# Patient Record
Sex: FEMALE | Race: WHITE | NOT HISPANIC OR LATINO | Employment: OTHER | URBAN - METROPOLITAN AREA
[De-identification: names, ages, dates, MRNs, and addresses within clinical notes are randomized per-mention and may not be internally consistent; named-entity substitution may affect disease eponyms.]

---

## 2017-02-07 ENCOUNTER — TELEPHONE (OUTPATIENT)
Dept: ONCOLOGY | Facility: CLINIC | Age: 82
End: 2017-02-07

## 2017-02-07 NOTE — TELEPHONE ENCOUNTER
----- Message from Delphine Corona sent at 2/7/2017 11:38 AM EST -----  Regarding: DARA PAIN  Contact: [phone]  DAUGHTER PJ WOULD LIKE FOR SOMEONE TO CALL HER BACK ABOUT THE PATIENT.  She has pain and maybe and urinary tract infection.

## 2017-02-08 ENCOUNTER — OFFICE VISIT (OUTPATIENT)
Dept: INTERNAL MEDICINE | Facility: CLINIC | Age: 82
End: 2017-02-08

## 2017-02-08 VITALS
RESPIRATION RATE: 14 BRPM | SYSTOLIC BLOOD PRESSURE: 128 MMHG | HEART RATE: 76 BPM | TEMPERATURE: 98.2 F | DIASTOLIC BLOOD PRESSURE: 72 MMHG | WEIGHT: 157 LBS | BODY MASS INDEX: 25.23 KG/M2 | OXYGEN SATURATION: 95 % | HEIGHT: 66 IN

## 2017-02-08 DIAGNOSIS — R39.9 URINARY SYMPTOM OR SIGN: Primary | ICD-10-CM

## 2017-02-08 DIAGNOSIS — I10 ESSENTIAL HYPERTENSION: ICD-10-CM

## 2017-02-08 DIAGNOSIS — I48.0 PAROXYSMAL ATRIAL FIBRILLATION (HCC): ICD-10-CM

## 2017-02-08 LAB
BILIRUB BLD-MCNC: NEGATIVE MG/DL
CLARITY, POC: ABNORMAL
COLOR UR: YELLOW
GLUCOSE UR STRIP-MCNC: NEGATIVE MG/DL
KETONES UR QL: NEGATIVE
LEUKOCYTE EST, POC: ABNORMAL
NITRITE UR-MCNC: NEGATIVE MG/ML
PH UR: 7 [PH] (ref 5–8)
PROT UR STRIP-MCNC: ABNORMAL MG/DL
RBC # UR STRIP: ABNORMAL /UL
SP GR UR: 1.01 (ref 1–1.03)
UROBILINOGEN UR QL: NORMAL

## 2017-02-08 PROCEDURE — 81003 URINALYSIS AUTO W/O SCOPE: CPT | Performed by: INTERNAL MEDICINE

## 2017-02-08 PROCEDURE — 99214 OFFICE O/P EST MOD 30 MIN: CPT | Performed by: INTERNAL MEDICINE

## 2017-02-08 RX ORDER — LOSARTAN POTASSIUM AND HYDROCHLOROTHIAZIDE 12.5; 1 MG/1; MG/1
1 TABLET ORAL DAILY
Status: ON HOLD | COMMUNITY
End: 2017-07-26

## 2017-02-08 RX ORDER — LEVOFLOXACIN 500 MG/1
500 TABLET, FILM COATED ORAL DAILY
COMMUNITY
End: 2017-03-09

## 2017-02-08 NOTE — PROGRESS NOTES
Venice Green is a 85 y.o. female    HPI coming in with her son-in-law continues to have dysuria and urinary frequency. Has been following up with urology. Yesterday the urologist had prescribed levofloxacin for her has had a history of urine culture positive for Enterobacter in the past. No fever or chills denies flank pain  Son-in-law has brought in all her medications for review some appear to be duplicates    The following portions of the patient's history were reviewed and updated as appropriate: allergies, current medications, past family history, past medical history, past social history, past surgical history, and problem list.     Review of Systems   Constitutional: Positive for fatigue. Negative for activity change, appetite change and fever.   HENT: Negative for congestion, ear discharge, ear pain and trouble swallowing.    Eyes: Negative for photophobia and visual disturbance.   Respiratory: Negative for cough and shortness of breath.    Cardiovascular: Negative for chest pain and palpitations.   Gastrointestinal: Negative for abdominal distention, abdominal pain, constipation, diarrhea, nausea and vomiting.   Endocrine: Negative.    Genitourinary: Positive for dysuria and urgency. Negative for hematuria.        Nocturia   Skin: Negative for color change and rash.   Allergic/Immunologic: Negative.    Neurological: Positive for weakness. Negative for dizziness, light-headedness and headaches.   Hematological: Negative for adenopathy. Does not bruise/bleed easily.   Psychiatric/Behavioral: Negative for agitation, confusion and dysphoric mood. The patient is nervous/anxious.        Vitals:    02/08/17 1054   BP: 128/72   Pulse: 76   Resp: 14   Temp: 98.2 °F (36.8 °C)   SpO2: 95%       Objective  Physical Exam   Constitutional: She is oriented to person, place, and time. She appears well-developed and well-nourished. No distress.   HENT:   Nose: Nose normal.   Mouth/Throat: Oropharynx is  clear and moist.   Eyes: Conjunctivae and EOM are normal. No scleral icterus.   Neck: No tracheal deviation present. No thyromegaly present.   Cardiovascular: Normal rate and regular rhythm.  Exam reveals no friction rub.    No murmur heard.  Pulmonary/Chest: No respiratory distress. She has no wheezes. She has no rales.   Abdominal: Soft. She exhibits no distension and no mass. There is no tenderness. There is no guarding.   Genitourinary: Vagina normal. No vaginal discharge found.   Musculoskeletal: Normal range of motion. She exhibits no deformity.   Lymphadenopathy:     She has no cervical adenopathy.   Neurological: She is alert and oriented to person, place, and time. She has normal reflexes. No cranial nerve deficit. Coordination normal.   Skin: Skin is warm and dry. No rash noted. No erythema.   Psychiatric: She has a normal mood and affect. Her behavior is normal. Judgment and thought content normal.       Diagnoses and all orders for this visit:    Urinary symptom or sign continue with current medications. External genitalia exam unremarkable. No discharge or bleeding noted through the urethral or vaginal opening  -     POC Urinalysis Dipstick, Automated  Hypertension reviewed medications. Univasc has been discontinued follow readings at home  A. fib controlled rate continue with anticoagulation therapy  Discussed medications and wrote down list with indications for each medication and handed to son-in-law  Other orders  -     levoFLOXacin (LEVAQUIN) 500 MG tablet; Take 500 mg by mouth Daily.  -     losartan-hydrochlorothiazide (HYZAAR) 100-12.5 MG per tablet; Take 1 tablet by mouth Daily.

## 2017-02-09 ENCOUNTER — OUTSIDE FACILITY SERVICE (OUTPATIENT)
Dept: INTERNAL MEDICINE | Facility: CLINIC | Age: 82
End: 2017-02-09

## 2017-02-13 RX ORDER — MIRTAZAPINE 30 MG/1
TABLET, FILM COATED ORAL
Qty: 30 TABLET | Refills: 5 | Status: SHIPPED | OUTPATIENT
Start: 2017-02-13 | End: 2017-02-15 | Stop reason: SDUPTHER

## 2017-02-15 RX ORDER — MIRTAZAPINE 30 MG/1
30 TABLET, FILM COATED ORAL NIGHTLY
Qty: 30 TABLET | Refills: 5 | Status: SHIPPED | OUTPATIENT
Start: 2017-02-15 | End: 2017-08-09 | Stop reason: SDUPTHER

## 2017-02-20 ENCOUNTER — OUTSIDE FACILITY SERVICE (OUTPATIENT)
Dept: INTERNAL MEDICINE | Facility: CLINIC | Age: 82
End: 2017-02-20

## 2017-02-20 PROCEDURE — G0179 MD RECERTIFICATION HHA PT: HCPCS | Performed by: INTERNAL MEDICINE

## 2017-02-21 RX ORDER — ATORVASTATIN CALCIUM 40 MG/1
TABLET, FILM COATED ORAL
Qty: 30 TABLET | Refills: 5 | Status: SHIPPED | OUTPATIENT
Start: 2017-02-21 | End: 2017-07-18 | Stop reason: SDUPTHER

## 2017-03-09 ENCOUNTER — OFFICE VISIT (OUTPATIENT)
Dept: INTERNAL MEDICINE | Facility: CLINIC | Age: 82
End: 2017-03-09

## 2017-03-09 VITALS
DIASTOLIC BLOOD PRESSURE: 75 MMHG | HEART RATE: 75 BPM | TEMPERATURE: 98.5 F | WEIGHT: 156.13 LBS | HEIGHT: 66 IN | OXYGEN SATURATION: 96 % | SYSTOLIC BLOOD PRESSURE: 120 MMHG | BODY MASS INDEX: 25.09 KG/M2

## 2017-03-09 DIAGNOSIS — N30.90 CYSTITIS: Primary | ICD-10-CM

## 2017-03-09 PROCEDURE — 99214 OFFICE O/P EST MOD 30 MIN: CPT | Performed by: INTERNAL MEDICINE

## 2017-03-09 RX ORDER — ESTRADIOL 0.1 MG/G
CREAM VAGINAL
Refills: 11 | Status: ON HOLD | COMMUNITY
Start: 2017-01-23 | End: 2017-09-13

## 2017-03-09 RX ORDER — AMLODIPINE BESYLATE 5 MG/1
TABLET ORAL
Refills: 2 | COMMUNITY
Start: 2017-02-24 | End: 2017-03-09

## 2017-03-09 RX ORDER — TOLTERODINE 4 MG/1
4 CAPSULE, EXTENDED RELEASE ORAL DAILY
COMMUNITY
End: 2017-03-09

## 2017-03-09 RX ORDER — OXYBUTYNIN CHLORIDE 10 MG/1
TABLET, EXTENDED RELEASE ORAL
Refills: 5 | COMMUNITY
Start: 2017-02-21 | End: 2017-03-09

## 2017-03-09 NOTE — PROGRESS NOTES
Subjective  Jose M Green is a 85 y.o. female    HPI coming in for follow-up patient with complaints of urinary frequency and discomfort in her vaginal region she was given a prescription for Desogen with which she has had significant relief she is following up with urology denies fever or chills no hematuria no flank pain    The following portions of the patient's history were reviewed and updated as appropriate: allergies, current medications, past family history, past medical history, past social history, past surgical history, and problem list.     Review of Systems   Constitutional: Positive for fatigue. Negative for activity change, appetite change and fever.   HENT: Negative for congestion, ear discharge, ear pain and trouble swallowing.    Eyes: Negative for photophobia and visual disturbance.   Respiratory: Negative for cough and shortness of breath.    Cardiovascular: Negative for chest pain and palpitations.   Gastrointestinal: Negative for abdominal distention, abdominal pain, constipation, diarrhea, nausea and vomiting.   Endocrine: Negative.    Genitourinary: Positive for urgency. Negative for hematuria.   Musculoskeletal: Positive for arthralgias and back pain.   Skin: Negative for color change and rash.   Allergic/Immunologic: Negative.    Neurological: Positive for weakness. Negative for dizziness, light-headedness and headaches.   Hematological: Negative for adenopathy. Does not bruise/bleed easily.   Psychiatric/Behavioral: Positive for sleep disturbance. Negative for agitation, confusion and dysphoric mood. The patient is nervous/anxious.        Vitals:    03/09/17 1601   BP: 120/75   Pulse: 75   Temp: 98.5 °F (36.9 °C)   SpO2: 96%       Objective  Physical Exam   Constitutional: She is oriented to person, place, and time. She appears well-developed and well-nourished. No distress.   Using walker   HENT:   Nose: Nose normal.   Mouth/Throat: Oropharynx is clear and moist.   Eyes: Conjunctivae  and EOM are normal. No scleral icterus.   Neck: No tracheal deviation present. No thyromegaly present.   Cardiovascular: Normal rate and regular rhythm.  Exam reveals no friction rub.    No murmur heard.  Pulmonary/Chest: No respiratory distress. She has no wheezes. She has no rales.   Abdominal: Soft. She exhibits no distension and no mass. There is no tenderness. There is no guarding.   Musculoskeletal: Normal range of motion. She exhibits deformity.   Lymphadenopathy:     She has no cervical adenopathy.   Neurological: She is alert and oriented to person, place, and time. She has normal reflexes. No cranial nerve deficit. Coordination normal.   Skin: Skin is warm and dry. No rash noted. No erythema.   Psychiatric: She has a normal mood and affect. Her behavior is normal. Judgment and thought content normal.       Diagnoses and all orders for this visit:    Cystitis table symptoms appear to have resolved encouraged to push fluids reviewed medications is being taken off Detrol and troponin which she is not taking any way currently.  Antihypertensives meds reviewed DC amlodipine she has been off this for more than a week now BP readings at home show good control    Other orders  -     ESTRACE VAGINAL 0.1 MG/GM vaginal cream; INSERT 0.1 G 3 TIMES A WEEK BY VAGINAL ROUTE AS DIRECTED  -     Discontinue: tolterodine LA (DETROL LA) 4 MG 24 hr capsule; Take 4 mg by mouth Daily.  -     Discontinue: amLODIPine (NORVASC) 5 MG tablet; TAKE 2 TABLETS BY MOUTH ONE TIME A DAY  -     Discontinue: oxybutynin XL (DITROPAN-XL) 10 MG 24 hr tablet; TAKE 1 TABLET BY MOUTH ONE TIME A DAY FOR 30 DAYS

## 2017-03-13 RX ORDER — METOPROLOL TARTRATE 50 MG/1
TABLET, FILM COATED ORAL
Qty: 60 TABLET | Refills: 4 | Status: SHIPPED | OUTPATIENT
Start: 2017-03-13 | End: 2017-04-03 | Stop reason: SDUPTHER

## 2017-03-13 RX ORDER — AMLODIPINE BESYLATE 5 MG/1
TABLET ORAL
Qty: 180 TABLET | Refills: 1 | Status: SHIPPED | OUTPATIENT
Start: 2017-03-13 | End: 2017-08-03

## 2017-03-13 RX ORDER — LEVOTHYROXINE SODIUM 0.1 MG/1
TABLET ORAL
Qty: 90 TABLET | Refills: 2 | Status: SHIPPED | OUTPATIENT
Start: 2017-03-13 | End: 2018-01-01 | Stop reason: SDUPTHER

## 2017-03-20 RX ORDER — APIXABAN 2.5 MG/1
TABLET, FILM COATED ORAL
Qty: 60 TABLET | Refills: 5 | Status: SHIPPED | OUTPATIENT
Start: 2017-03-20 | End: 2017-09-08 | Stop reason: SDUPTHER

## 2017-03-27 RX ORDER — DOCUSATE SODIUM 100 MG/1
CAPSULE, LIQUID FILLED ORAL
Qty: 30 CAPSULE | Refills: 3 | Status: SHIPPED | OUTPATIENT
Start: 2017-03-27 | End: 2017-07-10 | Stop reason: SDUPTHER

## 2017-04-03 RX ORDER — METOPROLOL TARTRATE 50 MG/1
50 TABLET, FILM COATED ORAL 2 TIMES DAILY
Qty: 60 TABLET | Refills: 4 | Status: SHIPPED | OUTPATIENT
Start: 2017-04-03 | End: 2017-08-07 | Stop reason: SDUPTHER

## 2017-05-12 RX ORDER — CLOPIDOGREL BISULFATE 75 MG/1
75 TABLET ORAL DAILY
Qty: 30 TABLET | Refills: 5 | Status: SHIPPED | OUTPATIENT
Start: 2017-05-12 | End: 2017-08-03

## 2017-06-09 ENCOUNTER — OFFICE VISIT (OUTPATIENT)
Dept: INTERNAL MEDICINE | Facility: CLINIC | Age: 82
End: 2017-06-09

## 2017-06-09 VITALS
HEIGHT: 66 IN | DIASTOLIC BLOOD PRESSURE: 70 MMHG | OXYGEN SATURATION: 98 % | WEIGHT: 152.38 LBS | HEART RATE: 78 BPM | BODY MASS INDEX: 24.49 KG/M2 | TEMPERATURE: 98.2 F | SYSTOLIC BLOOD PRESSURE: 120 MMHG

## 2017-06-09 DIAGNOSIS — I48.0 PAROXYSMAL ATRIAL FIBRILLATION (HCC): ICD-10-CM

## 2017-06-09 DIAGNOSIS — I10 ESSENTIAL HYPERTENSION: ICD-10-CM

## 2017-06-09 DIAGNOSIS — E03.9 ACQUIRED HYPOTHYROIDISM: ICD-10-CM

## 2017-06-09 DIAGNOSIS — C91.10 CHRONIC LYMPHOCYTIC LEUKEMIA (HCC): Primary | Chronic | ICD-10-CM

## 2017-06-09 PROCEDURE — 99214 OFFICE O/P EST MOD 30 MIN: CPT | Performed by: INTERNAL MEDICINE

## 2017-06-09 NOTE — PROGRESS NOTES
Venice Green is a 85 y.o. female    HPI coming in for follow-up patient with a history of coronary artery disease, atrial fibrillation on anticoagulative therapy history of reflux esophagitis has occasional dysuria no hematuria denies abdominal pain no fever or chills    The following portions of the patient's history were reviewed and updated as appropriate: allergies, current medications, past family history, past medical history, past social history, past surgical history, and problem list.     Review of Systems   Constitutional: Negative.  Negative for activity change, appetite change, fatigue and fever.   HENT: Negative for congestion, ear discharge, ear pain and trouble swallowing.    Eyes: Negative for photophobia and visual disturbance.   Respiratory: Negative for cough and shortness of breath.    Cardiovascular: Negative for chest pain and palpitations.   Gastrointestinal: Negative for abdominal distention, abdominal pain, constipation, diarrhea, nausea and vomiting.   Endocrine: Negative.    Genitourinary: Negative for dysuria, hematuria and urgency.   Musculoskeletal: Positive for arthralgias. Negative for back pain, joint swelling and myalgias.   Skin: Negative for color change and rash.   Allergic/Immunologic: Negative.    Neurological: Negative for dizziness, weakness, light-headedness and headaches.   Hematological: Negative for adenopathy. Does not bruise/bleed easily.   Psychiatric/Behavioral: Positive for decreased concentration, dysphoric mood and sleep disturbance. Negative for agitation and confusion. The patient is not nervous/anxious.        Vitals:    06/09/17 1616   BP: 120/70   Pulse: 78   Temp: 98.2 °F (36.8 °C)   SpO2: 98%       Objective  Physical Exam   Constitutional: She is oriented to person, place, and time. She appears well-developed and well-nourished. No distress.   Using walker   HENT:   Nose: Nose normal.   Mouth/Throat: Oropharynx is clear and moist.   Eyes:  Conjunctivae and EOM are normal. No scleral icterus.   Neck: No tracheal deviation present. No thyromegaly present.   Cardiovascular: Normal rate and regular rhythm.  Exam reveals no friction rub.    No murmur heard.  Pulmonary/Chest: No respiratory distress. She has no wheezes. She has no rales.   Abdominal: Soft. She exhibits no distension and no mass. There is no tenderness. There is no guarding.   Musculoskeletal: Normal range of motion. She exhibits tenderness and deformity.   kyphosis   Lymphadenopathy:     She has no cervical adenopathy.   Neurological: She is alert and oriented to person, place, and time. She has normal reflexes. No cranial nerve deficit. Coordination normal.   Skin: Skin is warm and dry. No rash noted. No erythema.   Psychiatric: She has a normal mood and affect. Her behavior is normal. Judgment and thought content normal.       Diagnoses and all orders for this visit:    Chronic lymphocytic leukemia stable is following up with oncology    Essential hypertension stable with current meds and low-salt diet    Acquired hypothyroidism clinically euthyroid follow TSH    Paroxysmal atrial fibrillation rate control okay continue with eliquis

## 2017-06-12 RX ORDER — TRAZODONE HYDROCHLORIDE 50 MG/1
TABLET ORAL
Qty: 30 TABLET | Refills: 5 | Status: SHIPPED | OUTPATIENT
Start: 2017-06-12 | End: 2018-01-01 | Stop reason: ALTCHOICE

## 2017-07-10 RX ORDER — DOCUSATE SODIUM 100 MG/1
CAPSULE, LIQUID FILLED ORAL
Qty: 30 CAPSULE | Refills: 5 | Status: SHIPPED | OUTPATIENT
Start: 2017-07-10 | End: 2018-01-01 | Stop reason: SDUPTHER

## 2017-07-10 RX ORDER — OMEPRAZOLE 20 MG/1
CAPSULE, DELAYED RELEASE ORAL
Qty: 30 CAPSULE | Refills: 11 | Status: SHIPPED | OUTPATIENT
Start: 2017-07-10 | End: 2018-01-01 | Stop reason: SDUPTHER

## 2017-07-18 RX ORDER — ATORVASTATIN CALCIUM 40 MG/1
40 TABLET, FILM COATED ORAL DAILY
Qty: 30 TABLET | Refills: 5 | Status: SHIPPED | OUTPATIENT
Start: 2017-07-18 | End: 2017-08-02 | Stop reason: SDUPTHER

## 2017-07-25 ENCOUNTER — HOSPITAL ENCOUNTER (INPATIENT)
Facility: HOSPITAL | Age: 82
LOS: 2 days | Discharge: HOME HEALTH | End: 2017-07-27
Attending: FAMILY MEDICINE | Admitting: INTERNAL MEDICINE

## 2017-07-25 ENCOUNTER — APPOINTMENT (OUTPATIENT)
Dept: CT IMAGING | Facility: HOSPITAL | Age: 82
End: 2017-07-25

## 2017-07-25 ENCOUNTER — APPOINTMENT (OUTPATIENT)
Dept: GENERAL RADIOLOGY | Facility: HOSPITAL | Age: 82
End: 2017-07-25

## 2017-07-25 DIAGNOSIS — Z74.09 IMPAIRED MOBILITY AND ADLS: ICD-10-CM

## 2017-07-25 DIAGNOSIS — I48.0 PAROXYSMAL ATRIAL FIBRILLATION (HCC): ICD-10-CM

## 2017-07-25 DIAGNOSIS — Z78.9 IMPAIRED MOBILITY AND ADLS: ICD-10-CM

## 2017-07-25 DIAGNOSIS — E87.6 HYPOKALEMIA: ICD-10-CM

## 2017-07-25 DIAGNOSIS — I10 ESSENTIAL HYPERTENSION: ICD-10-CM

## 2017-07-25 DIAGNOSIS — Z74.09 IMPAIRED FUNCTIONAL MOBILITY, BALANCE, GAIT, AND ENDURANCE: ICD-10-CM

## 2017-07-25 DIAGNOSIS — N30.01 ACUTE CYSTITIS WITH HEMATURIA: Primary | ICD-10-CM

## 2017-07-25 DIAGNOSIS — R53.1 WEAKNESS: ICD-10-CM

## 2017-07-25 LAB
ALBUMIN SERPL-MCNC: 3.5 G/DL (ref 3.5–5)
ALBUMIN/GLOB SERPL: 1.4 G/DL (ref 1–2)
ALP SERPL-CCNC: 133 U/L (ref 38–126)
ALT SERPL W P-5'-P-CCNC: 35 U/L (ref 13–69)
ANION GAP SERPL CALCULATED.3IONS-SCNC: 14.1 MMOL/L
ANISOCYTOSIS BLD QL: NORMAL
AST SERPL-CCNC: 26 U/L (ref 15–46)
BACTERIA UR QL AUTO: ABNORMAL /HPF
BASOPHILS # BLD AUTO: 0.04 10*3/MM3 (ref 0–0.2)
BASOPHILS NFR BLD AUTO: 0.4 % (ref 0–2.5)
BILIRUB SERPL-MCNC: 0.9 MG/DL (ref 0.2–1.3)
BILIRUB UR QL STRIP: NEGATIVE
BUN BLD-MCNC: 16 MG/DL (ref 7–20)
BUN/CREAT SERPL: 17.8 (ref 7.1–23.5)
CALCIUM SPEC-SCNC: 8.8 MG/DL (ref 8.4–10.2)
CHLORIDE SERPL-SCNC: 92 MMOL/L (ref 98–107)
CLARITY UR: ABNORMAL
CO2 SERPL-SCNC: 24 MMOL/L (ref 26–30)
COLOR UR: YELLOW
CREAT BLD-MCNC: 0.9 MG/DL (ref 0.6–1.3)
D-LACTATE SERPL-SCNC: 1.4 MMOL/L (ref 0.5–2)
DEPRECATED RDW RBC AUTO: 49.8 FL (ref 37–54)
EOSINOPHIL # BLD AUTO: 0.08 10*3/MM3 (ref 0–0.7)
EOSINOPHIL NFR BLD AUTO: 0.8 % (ref 0–7)
ERYTHROCYTE [DISTWIDTH] IN BLOOD BY AUTOMATED COUNT: 15.8 % (ref 11.5–14.5)
GFR SERPL CREATININE-BSD FRML MDRD: 59 ML/MIN/1.73
GLOBULIN UR ELPH-MCNC: 2.5 GM/DL
GLUCOSE BLD-MCNC: 111 MG/DL (ref 74–98)
GLUCOSE UR STRIP-MCNC: NEGATIVE MG/DL
HCT VFR BLD AUTO: 33.6 % (ref 37–47)
HGB BLD-MCNC: 10.8 G/DL (ref 12–16)
HGB UR QL STRIP.AUTO: ABNORMAL
HYALINE CASTS UR QL AUTO: ABNORMAL /LPF
IMM GRANULOCYTES # BLD: 0.05 10*3/MM3 (ref 0–0.06)
IMM GRANULOCYTES NFR BLD: 0.5 % (ref 0–0.6)
KETONES UR QL STRIP: ABNORMAL
LEUKOCYTE ESTERASE UR QL STRIP.AUTO: ABNORMAL
LYMPHOCYTES # BLD AUTO: 3.05 10*3/MM3 (ref 0.6–3.4)
LYMPHOCYTES NFR BLD AUTO: 32.3 % (ref 10–50)
MAGNESIUM SERPL-MCNC: 1.5 MG/DL (ref 1.6–2.3)
MCH RBC QN AUTO: 27.5 PG (ref 27–31)
MCHC RBC AUTO-ENTMCNC: 32.1 G/DL (ref 30–37)
MCV RBC AUTO: 85.5 FL (ref 81–99)
MONOCYTES # BLD AUTO: 0.82 10*3/MM3 (ref 0–0.9)
MONOCYTES NFR BLD AUTO: 8.7 % (ref 0–12)
NEUTROPHILS # BLD AUTO: 5.4 10*3/MM3 (ref 2–6.9)
NEUTROPHILS NFR BLD AUTO: 57.3 % (ref 37–80)
NITRITE UR QL STRIP: POSITIVE
NRBC BLD MANUAL-RTO: 0 /100 WBC (ref 0–0)
PH UR STRIP.AUTO: 6.5 [PH] (ref 5–8)
PLATELET # BLD AUTO: 86 10*3/MM3 (ref 130–400)
PMV BLD AUTO: 10.1 FL (ref 6–12)
POIKILOCYTOSIS BLD QL SMEAR: NORMAL
POTASSIUM BLD-SCNC: 3.1 MMOL/L (ref 3.5–5.1)
PROT SERPL-MCNC: 6 G/DL (ref 6.3–8.2)
PROT UR QL STRIP: ABNORMAL
RBC # BLD AUTO: 3.93 10*6/MM3 (ref 4.2–5.4)
RBC # UR: ABNORMAL /HPF
REF LAB TEST METHOD: ABNORMAL
SMALL PLATELETS BLD QL SMEAR: NORMAL
SODIUM BLD-SCNC: 127 MMOL/L (ref 137–145)
SP GR UR STRIP: 1.01 (ref 1–1.03)
SQUAMOUS #/AREA URNS HPF: ABNORMAL /HPF
TROPONIN I SERPL-MCNC: 0.02 NG/ML (ref 0–0.03)
UROBILINOGEN UR QL STRIP: ABNORMAL
WBC MORPH BLD: NORMAL
WBC NRBC COR # BLD: 9.44 10*3/MM3 (ref 4.8–10.8)
WBC UR QL AUTO: ABNORMAL /HPF
WHOLE BLOOD HOLD SPECIMEN: NORMAL

## 2017-07-25 PROCEDURE — 25010000002 MAGNESIUM SULFATE 2 GM/50ML SOLUTION: Performed by: INTERNAL MEDICINE

## 2017-07-25 PROCEDURE — 83735 ASSAY OF MAGNESIUM: CPT | Performed by: FAMILY MEDICINE

## 2017-07-25 PROCEDURE — 99285 EMERGENCY DEPT VISIT HI MDM: CPT

## 2017-07-25 PROCEDURE — 87086 URINE CULTURE/COLONY COUNT: CPT | Performed by: FAMILY MEDICINE

## 2017-07-25 PROCEDURE — 25010000002 CEFTRIAXONE: Performed by: FAMILY MEDICINE

## 2017-07-25 PROCEDURE — 85007 BL SMEAR W/DIFF WBC COUNT: CPT | Performed by: FAMILY MEDICINE

## 2017-07-25 PROCEDURE — 80053 COMPREHEN METABOLIC PANEL: CPT | Performed by: FAMILY MEDICINE

## 2017-07-25 PROCEDURE — 93005 ELECTROCARDIOGRAM TRACING: CPT | Performed by: FAMILY MEDICINE

## 2017-07-25 PROCEDURE — 72131 CT LUMBAR SPINE W/O DYE: CPT

## 2017-07-25 PROCEDURE — 71020 HC CHEST PA AND LATERAL: CPT

## 2017-07-25 PROCEDURE — 83605 ASSAY OF LACTIC ACID: CPT | Performed by: FAMILY MEDICINE

## 2017-07-25 PROCEDURE — 99222 1ST HOSP IP/OBS MODERATE 55: CPT | Performed by: INTERNAL MEDICINE

## 2017-07-25 PROCEDURE — 87040 BLOOD CULTURE FOR BACTERIA: CPT | Performed by: FAMILY MEDICINE

## 2017-07-25 PROCEDURE — 72170 X-RAY EXAM OF PELVIS: CPT

## 2017-07-25 PROCEDURE — 84484 ASSAY OF TROPONIN QUANT: CPT

## 2017-07-25 PROCEDURE — 81001 URINALYSIS AUTO W/SCOPE: CPT | Performed by: FAMILY MEDICINE

## 2017-07-25 PROCEDURE — 87186 SC STD MICRODIL/AGAR DIL: CPT | Performed by: FAMILY MEDICINE

## 2017-07-25 PROCEDURE — 87077 CULTURE AEROBIC IDENTIFY: CPT | Performed by: FAMILY MEDICINE

## 2017-07-25 PROCEDURE — 81003 URINALYSIS AUTO W/O SCOPE: CPT | Performed by: FAMILY MEDICINE

## 2017-07-25 PROCEDURE — 85025 COMPLETE CBC W/AUTO DIFF WBC: CPT | Performed by: FAMILY MEDICINE

## 2017-07-25 PROCEDURE — 93005 ELECTROCARDIOGRAM TRACING: CPT

## 2017-07-25 RX ORDER — HYDRALAZINE HYDROCHLORIDE 20 MG/ML
10 INJECTION INTRAMUSCULAR; INTRAVENOUS EVERY 6 HOURS PRN
Status: DISCONTINUED | OUTPATIENT
Start: 2017-07-25 | End: 2017-07-27 | Stop reason: HOSPADM

## 2017-07-25 RX ORDER — SODIUM CHLORIDE 9 MG/ML
75 INJECTION, SOLUTION INTRAVENOUS CONTINUOUS
Status: DISCONTINUED | OUTPATIENT
Start: 2017-07-25 | End: 2017-07-26

## 2017-07-25 RX ORDER — ACETAMINOPHEN 325 MG/1
650 TABLET ORAL EVERY 4 HOURS PRN
Status: DISCONTINUED | OUTPATIENT
Start: 2017-07-25 | End: 2017-07-27 | Stop reason: HOSPADM

## 2017-07-25 RX ORDER — CLOPIDOGREL BISULFATE 75 MG/1
75 TABLET ORAL DAILY
Status: DISCONTINUED | OUTPATIENT
Start: 2017-07-26 | End: 2017-07-27 | Stop reason: HOSPADM

## 2017-07-25 RX ORDER — LEVOTHYROXINE SODIUM 0.1 MG/1
100 TABLET ORAL
Status: DISCONTINUED | OUTPATIENT
Start: 2017-07-26 | End: 2017-07-27 | Stop reason: HOSPADM

## 2017-07-25 RX ORDER — MOEXIPRIL HYDROCHLORIDE 7.5 MG/1
7.5 TABLET, FILM COATED ORAL NIGHTLY
COMMUNITY
End: 2017-09-14 | Stop reason: HOSPADM

## 2017-07-25 RX ORDER — SODIUM CHLORIDE 0.9 % (FLUSH) 0.9 %
10 SYRINGE (ML) INJECTION AS NEEDED
Status: DISCONTINUED | OUTPATIENT
Start: 2017-07-25 | End: 2017-07-27 | Stop reason: HOSPADM

## 2017-07-25 RX ORDER — LATANOPROST 50 UG/ML
1 SOLUTION/ DROPS OPHTHALMIC NIGHTLY
Status: DISCONTINUED | OUTPATIENT
Start: 2017-07-25 | End: 2017-07-27 | Stop reason: HOSPADM

## 2017-07-25 RX ORDER — MAGNESIUM SULFATE HEPTAHYDRATE 40 MG/ML
2 INJECTION, SOLUTION INTRAVENOUS ONCE
Status: COMPLETED | OUTPATIENT
Start: 2017-07-25 | End: 2017-07-26

## 2017-07-25 RX ORDER — AMLODIPINE BESYLATE 5 MG/1
5 TABLET ORAL
Status: DISCONTINUED | OUTPATIENT
Start: 2017-07-26 | End: 2017-07-27 | Stop reason: HOSPADM

## 2017-07-25 RX ORDER — ACETAMINOPHEN 325 MG/1
650 TABLET ORAL ONCE
Status: COMPLETED | OUTPATIENT
Start: 2017-07-25 | End: 2017-07-25

## 2017-07-25 RX ORDER — TRAZODONE HYDROCHLORIDE 50 MG/1
50 TABLET ORAL NIGHTLY
Status: DISCONTINUED | OUTPATIENT
Start: 2017-07-25 | End: 2017-07-27 | Stop reason: HOSPADM

## 2017-07-25 RX ORDER — PANTOPRAZOLE SODIUM 40 MG/1
40 TABLET, DELAYED RELEASE ORAL EVERY MORNING
Status: DISCONTINUED | OUTPATIENT
Start: 2017-07-26 | End: 2017-07-27 | Stop reason: HOSPADM

## 2017-07-25 RX ORDER — POTASSIUM CHLORIDE 1.5 G/1.77G
20 POWDER, FOR SOLUTION ORAL ONCE
Status: COMPLETED | OUTPATIENT
Start: 2017-07-25 | End: 2017-07-25

## 2017-07-25 RX ORDER — MIRTAZAPINE 15 MG/1
30 TABLET, FILM COATED ORAL NIGHTLY
Status: DISCONTINUED | OUTPATIENT
Start: 2017-07-25 | End: 2017-07-27 | Stop reason: HOSPADM

## 2017-07-25 RX ORDER — METOPROLOL TARTRATE 50 MG/1
50 TABLET, FILM COATED ORAL 2 TIMES DAILY
Status: DISCONTINUED | OUTPATIENT
Start: 2017-07-26 | End: 2017-07-27 | Stop reason: HOSPADM

## 2017-07-25 RX ORDER — SODIUM CHLORIDE 0.9 % (FLUSH) 0.9 %
1-10 SYRINGE (ML) INJECTION AS NEEDED
Status: DISCONTINUED | OUTPATIENT
Start: 2017-07-25 | End: 2017-07-27 | Stop reason: HOSPADM

## 2017-07-25 RX ORDER — ATORVASTATIN CALCIUM 40 MG/1
40 TABLET, FILM COATED ORAL DAILY
Status: DISCONTINUED | OUTPATIENT
Start: 2017-07-26 | End: 2017-07-27 | Stop reason: HOSPADM

## 2017-07-25 RX ADMIN — SODIUM CHLORIDE 500 ML: 9 INJECTION, SOLUTION INTRAVENOUS at 20:21

## 2017-07-25 RX ADMIN — SODIUM CHLORIDE 75 ML/HR: 9 INJECTION, SOLUTION INTRAVENOUS at 23:45

## 2017-07-25 RX ADMIN — MAGNESIUM SULFATE HEPTAHYDRATE 2 G: 40 INJECTION, SOLUTION INTRAVENOUS at 23:45

## 2017-07-25 RX ADMIN — ACETAMINOPHEN 650 MG: 325 TABLET, FILM COATED ORAL at 20:16

## 2017-07-25 RX ADMIN — TRAZODONE HYDROCHLORIDE 50 MG: 50 TABLET ORAL at 23:45

## 2017-07-25 RX ADMIN — LATANOPROST 1 DROP: 50 SOLUTION OPHTHALMIC at 23:45

## 2017-07-25 RX ADMIN — CEFTRIAXONE 1 G: 1 INJECTION, POWDER, FOR SOLUTION INTRAMUSCULAR; INTRAVENOUS at 20:22

## 2017-07-25 RX ADMIN — POTASSIUM CHLORIDE 20 MEQ: 1.5 POWDER, FOR SOLUTION ORAL at 21:39

## 2017-07-25 RX ADMIN — MIRTAZAPINE 30 MG: 15 TABLET, FILM COATED ORAL at 23:44

## 2017-07-26 ENCOUNTER — APPOINTMENT (OUTPATIENT)
Dept: ULTRASOUND IMAGING | Facility: HOSPITAL | Age: 82
End: 2017-07-26

## 2017-07-26 PROBLEM — E87.6 HYPOKALEMIA: Status: ACTIVE | Noted: 2017-07-26

## 2017-07-26 LAB
ALBUMIN SERPL-MCNC: 3.7 G/DL (ref 3.5–5)
ALBUMIN/GLOB SERPL: 1.4 G/DL (ref 1–2)
ALP SERPL-CCNC: 154 U/L (ref 38–126)
ALT SERPL W P-5'-P-CCNC: 34 U/L (ref 13–69)
ANION GAP SERPL CALCULATED.3IONS-SCNC: 15.8 MMOL/L
AST SERPL-CCNC: 27 U/L (ref 15–46)
BILIRUB SERPL-MCNC: 0.9 MG/DL (ref 0.2–1.3)
BUN BLD-MCNC: 11 MG/DL (ref 7–20)
BUN/CREAT SERPL: 13.8 (ref 7.1–23.5)
CALCIUM SPEC-SCNC: 9 MG/DL (ref 8.4–10.2)
CHLORIDE SERPL-SCNC: 94 MMOL/L (ref 98–107)
CO2 SERPL-SCNC: 24 MMOL/L (ref 26–30)
CREAT BLD-MCNC: 0.8 MG/DL (ref 0.6–1.3)
DEPRECATED RDW RBC AUTO: 49.1 FL (ref 37–54)
ERYTHROCYTE [DISTWIDTH] IN BLOOD BY AUTOMATED COUNT: 15.8 % (ref 11.5–14.5)
GFR SERPL CREATININE-BSD FRML MDRD: 68 ML/MIN/1.73
GLOBULIN UR ELPH-MCNC: 2.6 GM/DL
GLUCOSE BLD-MCNC: 119 MG/DL (ref 74–98)
HCT VFR BLD AUTO: 37.8 % (ref 37–47)
HGB BLD-MCNC: 12.2 G/DL (ref 12–16)
MCH RBC QN AUTO: 27.5 PG (ref 27–31)
MCHC RBC AUTO-ENTMCNC: 32.3 G/DL (ref 30–37)
MCV RBC AUTO: 85.1 FL (ref 81–99)
PLATELET # BLD AUTO: 83 10*3/MM3 (ref 130–400)
PMV BLD AUTO: 9.6 FL (ref 6–12)
POTASSIUM BLD-SCNC: 2.8 MMOL/L (ref 3.5–5.1)
PROT SERPL-MCNC: 6.3 G/DL (ref 6.3–8.2)
RBC # BLD AUTO: 4.44 10*6/MM3 (ref 4.2–5.4)
SODIUM BLD-SCNC: 131 MMOL/L (ref 137–145)
TSH SERPL DL<=0.05 MIU/L-ACNC: 5.46 MIU/ML (ref 0.47–4.68)
WBC NRBC COR # BLD: 11.05 10*3/MM3 (ref 4.8–10.8)

## 2017-07-26 PROCEDURE — 97162 PT EVAL MOD COMPLEX 30 MIN: CPT

## 2017-07-26 PROCEDURE — 85027 COMPLETE CBC AUTOMATED: CPT | Performed by: INTERNAL MEDICINE

## 2017-07-26 PROCEDURE — 99232 SBSQ HOSP IP/OBS MODERATE 35: CPT | Performed by: INTERNAL MEDICINE

## 2017-07-26 PROCEDURE — 97165 OT EVAL LOW COMPLEX 30 MIN: CPT

## 2017-07-26 PROCEDURE — 93005 ELECTROCARDIOGRAM TRACING: CPT | Performed by: INTERNAL MEDICINE

## 2017-07-26 PROCEDURE — 84443 ASSAY THYROID STIM HORMONE: CPT | Performed by: INTERNAL MEDICINE

## 2017-07-26 PROCEDURE — 80053 COMPREHEN METABOLIC PANEL: CPT | Performed by: INTERNAL MEDICINE

## 2017-07-26 PROCEDURE — 76775 US EXAM ABDO BACK WALL LIM: CPT

## 2017-07-26 RX ORDER — POTASSIUM CHLORIDE 1.5 G/1.77G
20 POWDER, FOR SOLUTION ORAL 2 TIMES DAILY
Status: DISCONTINUED | OUTPATIENT
Start: 2017-07-26 | End: 2017-07-26

## 2017-07-26 RX ORDER — NITROFURANTOIN 25; 75 MG/1; MG/1
100 CAPSULE ORAL EVERY 12 HOURS SCHEDULED
Status: DISCONTINUED | OUTPATIENT
Start: 2017-07-26 | End: 2017-07-27 | Stop reason: HOSPADM

## 2017-07-26 RX ORDER — OXYBUTYNIN CHLORIDE 5 MG/1
10 TABLET, EXTENDED RELEASE ORAL DAILY
Status: DISCONTINUED | OUTPATIENT
Start: 2017-07-26 | End: 2017-07-27 | Stop reason: HOSPADM

## 2017-07-26 RX ORDER — POTASSIUM CHLORIDE 20 MEQ/1
40 TABLET, EXTENDED RELEASE ORAL 2 TIMES DAILY WITH MEALS
Status: DISCONTINUED | OUTPATIENT
Start: 2017-07-26 | End: 2017-07-26

## 2017-07-26 RX ORDER — METOPROLOL TARTRATE 5 MG/5ML
5 INJECTION INTRAVENOUS ONCE
Status: COMPLETED | OUTPATIENT
Start: 2017-07-26 | End: 2017-07-26

## 2017-07-26 RX ORDER — LOSARTAN POTASSIUM AND HYDROCHLOROTHIAZIDE 25; 100 MG/1; MG/1
1 TABLET ORAL DAILY
COMMUNITY
End: 2017-07-27 | Stop reason: HOSPADM

## 2017-07-26 RX ORDER — PHENAZOPYRIDINE HYDROCHLORIDE 100 MG/1
100 TABLET, FILM COATED ORAL
Status: DISCONTINUED | OUTPATIENT
Start: 2017-07-26 | End: 2017-07-27 | Stop reason: HOSPADM

## 2017-07-26 RX ADMIN — METOPROLOL TARTRATE 50 MG: 50 TABLET ORAL at 17:00

## 2017-07-26 RX ADMIN — MIRTAZAPINE 30 MG: 15 TABLET, FILM COATED ORAL at 20:48

## 2017-07-26 RX ADMIN — ACETAMINOPHEN 650 MG: 325 TABLET, FILM COATED ORAL at 00:01

## 2017-07-26 RX ADMIN — CLOPIDOGREL BISULFATE 75 MG: 75 TABLET ORAL at 08:33

## 2017-07-26 RX ADMIN — ATORVASTATIN CALCIUM 40 MG: 40 TABLET, FILM COATED ORAL at 08:33

## 2017-07-26 RX ADMIN — PHENAZOPYRIDINE HYDROCHLORIDE 100 MG: 100 TABLET ORAL at 11:04

## 2017-07-26 RX ADMIN — NITROFURANTOIN MONOHYDRATE/MACROCRYSTALLINE 100 MG: 25; 75 CAPSULE ORAL at 20:48

## 2017-07-26 RX ADMIN — PHENAZOPYRIDINE HYDROCHLORIDE 100 MG: 100 TABLET ORAL at 17:00

## 2017-07-26 RX ADMIN — NITROFURANTOIN MONOHYDRATE/MACROCRYSTALLINE 100 MG: 25; 75 CAPSULE ORAL at 11:04

## 2017-07-26 RX ADMIN — LEVOTHYROXINE SODIUM 100 MCG: 100 TABLET ORAL at 06:59

## 2017-07-26 RX ADMIN — METOPROLOL TARTRATE 50 MG: 50 TABLET ORAL at 08:33

## 2017-07-26 RX ADMIN — AMLODIPINE BESYLATE 5 MG: 5 TABLET ORAL at 08:33

## 2017-07-26 RX ADMIN — POTASSIUM CHLORIDE 40 MEQ: 20 TABLET, EXTENDED RELEASE ORAL at 08:36

## 2017-07-26 RX ADMIN — METOPROLOL TARTRATE 5 MG: 5 INJECTION INTRAVENOUS at 05:52

## 2017-07-26 RX ADMIN — TRAZODONE HYDROCHLORIDE 50 MG: 50 TABLET ORAL at 20:48

## 2017-07-26 RX ADMIN — APIXABAN 2.5 MG: 2.5 TABLET, FILM COATED ORAL at 20:48

## 2017-07-26 RX ADMIN — POTASSIUM CHLORIDE 20 MEQ: 1.5 POWDER, FOR SOLUTION ORAL at 11:04

## 2017-07-26 RX ADMIN — APIXABAN 2.5 MG: 2.5 TABLET, FILM COATED ORAL at 08:33

## 2017-07-26 RX ADMIN — LATANOPROST 1 DROP: 50 SOLUTION OPHTHALMIC at 20:49

## 2017-07-26 RX ADMIN — OXYBUTYNIN CHLORIDE 10 MG: 5 TABLET, FILM COATED, EXTENDED RELEASE ORAL at 11:04

## 2017-07-26 RX ADMIN — PANTOPRAZOLE SODIUM 40 MG: 40 TABLET, DELAYED RELEASE ORAL at 06:59

## 2017-07-27 VITALS
WEIGHT: 144.44 LBS | TEMPERATURE: 98.2 F | HEIGHT: 66 IN | SYSTOLIC BLOOD PRESSURE: 117 MMHG | DIASTOLIC BLOOD PRESSURE: 61 MMHG | RESPIRATION RATE: 16 BRPM | OXYGEN SATURATION: 100 % | HEART RATE: 96 BPM | BODY MASS INDEX: 23.21 KG/M2

## 2017-07-27 PROBLEM — E87.6 HYPOKALEMIA: Status: RESOLVED | Noted: 2017-07-26 | Resolved: 2017-07-27

## 2017-07-27 LAB
ANION GAP SERPL CALCULATED.3IONS-SCNC: 13.8 MMOL/L
BACTERIA SPEC AEROBE CULT: ABNORMAL
BUN BLD-MCNC: 13 MG/DL (ref 7–20)
BUN/CREAT SERPL: 16.3 (ref 7.1–23.5)
CALCIUM SPEC-SCNC: 8.9 MG/DL (ref 8.4–10.2)
CHLORIDE SERPL-SCNC: 95 MMOL/L (ref 98–107)
CO2 SERPL-SCNC: 24 MMOL/L (ref 26–30)
CREAT BLD-MCNC: 0.8 MG/DL (ref 0.6–1.3)
GFR SERPL CREATININE-BSD FRML MDRD: 68 ML/MIN/1.73
GLUCOSE BLD-MCNC: 102 MG/DL (ref 74–98)
POTASSIUM BLD-SCNC: 3.8 MMOL/L (ref 3.5–5.1)
SODIUM BLD-SCNC: 129 MMOL/L (ref 137–145)

## 2017-07-27 PROCEDURE — 97530 THERAPEUTIC ACTIVITIES: CPT

## 2017-07-27 PROCEDURE — 99239 HOSP IP/OBS DSCHRG MGMT >30: CPT | Performed by: INTERNAL MEDICINE

## 2017-07-27 PROCEDURE — 97116 GAIT TRAINING THERAPY: CPT

## 2017-07-27 PROCEDURE — 97535 SELF CARE MNGMENT TRAINING: CPT

## 2017-07-27 PROCEDURE — 97110 THERAPEUTIC EXERCISES: CPT

## 2017-07-27 PROCEDURE — 80048 BASIC METABOLIC PNL TOTAL CA: CPT | Performed by: INTERNAL MEDICINE

## 2017-07-27 RX ORDER — PHENAZOPYRIDINE HYDROCHLORIDE 100 MG/1
100 TABLET, FILM COATED ORAL
Qty: 21 TABLET | Refills: 0 | Status: SHIPPED | OUTPATIENT
Start: 2017-07-27 | End: 2017-08-03

## 2017-07-27 RX ORDER — OXYBUTYNIN CHLORIDE 10 MG/1
10 TABLET, EXTENDED RELEASE ORAL DAILY
Qty: 30 TABLET | Refills: 2 | Status: SHIPPED | OUTPATIENT
Start: 2017-07-27 | End: 2018-01-01 | Stop reason: ALTCHOICE

## 2017-07-27 RX ORDER — NITROFURANTOIN 25; 75 MG/1; MG/1
100 CAPSULE ORAL EVERY 12 HOURS SCHEDULED
Qty: 14 CAPSULE | Refills: 0 | Status: SHIPPED | OUTPATIENT
Start: 2017-07-27 | End: 2017-08-03

## 2017-07-27 RX ADMIN — ATORVASTATIN CALCIUM 40 MG: 40 TABLET, FILM COATED ORAL at 10:14

## 2017-07-27 RX ADMIN — APIXABAN 2.5 MG: 2.5 TABLET, FILM COATED ORAL at 10:16

## 2017-07-27 RX ADMIN — AMLODIPINE BESYLATE 5 MG: 5 TABLET ORAL at 10:16

## 2017-07-27 RX ADMIN — PHENAZOPYRIDINE HYDROCHLORIDE 100 MG: 100 TABLET ORAL at 10:16

## 2017-07-27 RX ADMIN — PHENAZOPYRIDINE HYDROCHLORIDE 100 MG: 100 TABLET ORAL at 12:21

## 2017-07-27 RX ADMIN — LEVOTHYROXINE SODIUM 100 MCG: 100 TABLET ORAL at 06:27

## 2017-07-27 RX ADMIN — NITROFURANTOIN MONOHYDRATE/MACROCRYSTALLINE 100 MG: 25; 75 CAPSULE ORAL at 10:15

## 2017-07-27 RX ADMIN — OXYBUTYNIN CHLORIDE 10 MG: 5 TABLET, FILM COATED, EXTENDED RELEASE ORAL at 10:15

## 2017-07-27 RX ADMIN — PANTOPRAZOLE SODIUM 40 MG: 40 TABLET, DELAYED RELEASE ORAL at 06:27

## 2017-07-27 RX ADMIN — METOPROLOL TARTRATE 50 MG: 50 TABLET ORAL at 10:19

## 2017-07-27 RX ADMIN — CLOPIDOGREL BISULFATE 75 MG: 75 TABLET ORAL at 10:15

## 2017-07-28 ENCOUNTER — TRANSITIONAL CARE MANAGEMENT TELEPHONE ENCOUNTER (OUTPATIENT)
Dept: INTERNAL MEDICINE | Facility: CLINIC | Age: 82
End: 2017-07-28

## 2017-07-28 ENCOUNTER — TELEPHONE (OUTPATIENT)
Dept: INTERNAL MEDICINE | Facility: CLINIC | Age: 82
End: 2017-07-28

## 2017-07-28 NOTE — TELEPHONE ENCOUNTER
----- Message from Nya Sotomayor MA sent at 7/28/2017  2:14 PM EDT -----  Regarding: FW: GIULIA patient PT Verbal Order Requested      ----- Message -----     From: Katie Gómez RN     Sent: 7/28/2017  11:48 AM       To: Tamie Benedict LPN, Nya Sotomayor MA  Subject: GIULIA patient PT Verbal Order Requested            I contacted pt to complete GIULIA call and s/w [facility] PT.  He requests verbal order for PT services plan of care. PT requests to see pt 3x weekly x 3 weeks, then 2x weekly x 2 weeks, then 1x weekly x 2 weeks for PT, Transferring, Gait Training, & Balance Training. Please call Deepak @ [phone] to provide verbal authorization. I will call pt back later today to complete GIULIA call when she is available. Thank you.

## 2017-07-30 LAB
BACTERIA SPEC AEROBE CULT: NORMAL
BACTERIA SPEC AEROBE CULT: NORMAL

## 2017-07-31 ENCOUNTER — TELEPHONE (OUTPATIENT)
Dept: INTERNAL MEDICINE | Facility: CLINIC | Age: 82
End: 2017-07-31

## 2017-07-31 NOTE — TELEPHONE ENCOUNTER
----- Message from Katie Gómez RN sent at 7/28/2017 11:48 AM EDT -----  Regarding: GIULIA patient PT Verbal Order Requested  I contacted pt to complete GIULIA call and s/w [facility] PT.  He requests verbal order for PT services plan of care. PT requests to see pt 3x weekly x 3 weeks, then 2x weekly x 2 weeks, then 1x weekly x 2 weeks for PT, Transferring, Gait Training, & Balance Training. Please call Deepak @ [phone] to provide verbal authorization. I will call pt back later today to complete GIULIA call when she is available. Thank you.

## 2017-08-02 ENCOUNTER — TELEPHONE (OUTPATIENT)
Dept: INTERNAL MEDICINE | Facility: CLINIC | Age: 82
End: 2017-08-02

## 2017-08-02 RX ORDER — ATORVASTATIN CALCIUM 40 MG/1
40 TABLET, FILM COATED ORAL DAILY
Qty: 90 TABLET | Refills: 3 | Status: SHIPPED | OUTPATIENT
Start: 2017-08-02 | End: 2018-01-01 | Stop reason: SDUPTHER

## 2017-08-03 ENCOUNTER — OFFICE VISIT (OUTPATIENT)
Dept: INTERNAL MEDICINE | Facility: CLINIC | Age: 82
End: 2017-08-03

## 2017-08-03 VITALS
HEART RATE: 72 BPM | DIASTOLIC BLOOD PRESSURE: 70 MMHG | WEIGHT: 148.38 LBS | SYSTOLIC BLOOD PRESSURE: 115 MMHG | OXYGEN SATURATION: 97 % | BODY MASS INDEX: 23.84 KG/M2 | TEMPERATURE: 98.6 F | HEIGHT: 66 IN

## 2017-08-03 DIAGNOSIS — I48.0 PAROXYSMAL ATRIAL FIBRILLATION (HCC): ICD-10-CM

## 2017-08-03 DIAGNOSIS — I10 ESSENTIAL HYPERTENSION: ICD-10-CM

## 2017-08-03 DIAGNOSIS — N30.00 ACUTE CYSTITIS WITHOUT HEMATURIA: Primary | ICD-10-CM

## 2017-08-03 LAB
BUN SERPL-MCNC: 15 MG/DL (ref 7–20)
BUN/CREAT SERPL: 16.7 (ref 7.1–23.5)
CALCIUM SERPL-MCNC: 8.9 MG/DL (ref 8.4–10.2)
CHLORIDE SERPL-SCNC: 91 MMOL/L (ref 98–107)
CO2 SERPL-SCNC: 26 MMOL/L (ref 26–30)
CREAT SERPL-MCNC: 0.9 MG/DL (ref 0.6–1.3)
GLUCOSE SERPL-MCNC: 95 MG/DL (ref 74–98)
POTASSIUM SERPL-SCNC: 4.8 MMOL/L (ref 3.5–5.1)
SODIUM SERPL-SCNC: 128 MMOL/L (ref 137–145)

## 2017-08-03 PROCEDURE — 99495 TRANSJ CARE MGMT MOD F2F 14D: CPT | Performed by: INTERNAL MEDICINE

## 2017-08-03 RX ORDER — ASPIRIN 81 MG/1
81 TABLET ORAL DAILY
Qty: 90 TABLET | Refills: 3 | Status: SHIPPED | OUTPATIENT
Start: 2017-08-03 | End: 2017-09-14 | Stop reason: HOSPADM

## 2017-08-03 RX ORDER — CEFUROXIME AXETIL 250 MG/1
250 TABLET ORAL 2 TIMES DAILY
Qty: 14 TABLET | Refills: 0 | Status: ON HOLD | OUTPATIENT
Start: 2017-08-03 | End: 2017-09-15

## 2017-08-03 NOTE — PROGRESS NOTES
Transitional Care Follow Up Visit  Subjective     Jose M Green is a 86 y.o. female who presents for a transitional care management visit.    Within 48 business hours after discharge our office contacted her via telephone to coordinate her care and needs.      I reviewed and discussed the details of that call along with the discharge summary, hospital problems, inpatient lab results, inpatient diagnostic studies, and consultation reports with Jose M.    Date of TCM Phone Call 9/9/2016 9/9/2016 7/28/2017   [facility]   Date of Admission 9/5/2016 9/5/2016 7/25/2017   Date of Discharge 9/7/2016 9/7/2016 7/27/2017   Risk for Readmission (LACE Score) - - 7   Discharge Disposition Home or Self Care Home or Self Care Home-[facility]       History of Present Illness   Course During Hospital Stay:  Admitted for UTI and suspected says cyst for which she was given IV fluids and IV antibiotic therapy subsequently switched over to oral Macrodantin with which she continued to show improvement in her urine culture has shown Klebsiella which he has intermediate sensitivity to Macrodantin. The patient has had recurring UTIs in the past. History of coronary artery disease. In the hospital had a short run of atrial fibrillation. Patient with history of paroxysmal A. fib for which she has been on L requests. The patient also showed evidence of hypokalemia and hypomagnesemia along with dehydration this was corrected with oral supplements and IV fluids. She is coming in for follow-up today with a care giver some of the history obtained from her.       Review of Systems   Constitutional: Negative.  Negative for activity change, appetite change, fatigue and fever.   HENT: Negative for congestion, ear discharge, ear pain and trouble swallowing.    Eyes: Negative for photophobia and visual disturbance.   Respiratory: Negative for cough and shortness of breath.     Cardiovascular: Negative for chest pain and palpitations.   Gastrointestinal: Negative for abdominal distention, abdominal pain, constipation, diarrhea, nausea and vomiting.   Endocrine: Negative.    Genitourinary: Negative for dysuria, hematuria and urgency.   Musculoskeletal: Positive for arthralgias. Negative for back pain, joint swelling and myalgias.   Skin: Negative for color change and rash.   Allergic/Immunologic: Negative.    Neurological: Negative for dizziness, weakness, light-headedness and headaches.   Hematological: Negative for adenopathy. Does not bruise/bleed easily.   Psychiatric/Behavioral: Positive for decreased concentration, dysphoric mood and sleep disturbance. Negative for agitation and confusion. The patient is not nervous/anxious.        Objective   Physical Exam   Constitutional: She is oriented to person, place, and time. She appears well-developed and well-nourished. No distress.   HENT:   Nose: Nose normal.   Mouth/Throat: Oropharynx is clear and moist.   Eyes: Conjunctivae and EOM are normal. No scleral icterus.   Neck: No tracheal deviation present. No thyromegaly present.   Cardiovascular: Normal rate and regular rhythm.  Exam reveals no friction rub.    No murmur heard.  Pulmonary/Chest: No respiratory distress. She has no wheezes. She has no rales.   Abdominal: Soft. She exhibits no distension and no mass. There is no tenderness. There is no guarding.   Musculoskeletal: Normal range of motion. She exhibits deformity.   Lymphadenopathy:     She has no cervical adenopathy.   Neurological: She is alert and oriented to person, place, and time. She has normal reflexes. No cranial nerve deficit. Coordination normal.   Skin: Skin is warm and dry. No rash noted. No erythema.   Psychiatric: She has a normal mood and affect. Her behavior is normal. Judgment and thought content normal.       Assessment/Plan   Jose M was seen today for transitional care management.    Diagnoses and all  orders for this visit:    Acute cystitis without hematuria switched over to Ceftin 250 mg twice a day for 7 days. Patient has also been following up with her urologist Dr. Mixon. Continue to push fluids    Essential hypertension stable with current meds repeat BMP currently in regular rhythm continue with anticoagulant therapy    Paroxysmal atrial fibrillation

## 2017-08-04 ENCOUNTER — OUTSIDE FACILITY SERVICE (OUTPATIENT)
Dept: INTERNAL MEDICINE | Facility: CLINIC | Age: 82
End: 2017-08-04

## 2017-08-04 PROCEDURE — G0180 MD CERTIFICATION HHA PATIENT: HCPCS | Performed by: INTERNAL MEDICINE

## 2017-08-07 RX ORDER — METOPROLOL TARTRATE 50 MG/1
TABLET, FILM COATED ORAL
Qty: 60 TABLET | Refills: 3 | Status: SHIPPED | OUTPATIENT
Start: 2017-08-07 | End: 2017-12-16 | Stop reason: SDUPTHER

## 2017-08-08 ENCOUNTER — TELEPHONE (OUTPATIENT)
Dept: INTERNAL MEDICINE | Facility: CLINIC | Age: 82
End: 2017-08-08

## 2017-08-08 NOTE — TELEPHONE ENCOUNTER
----- Message from Speedy Bravo MD sent at 8/4/2017  9:48 AM EDT -----  Please inform patient labs are okay.

## 2017-08-09 RX ORDER — MIRTAZAPINE 30 MG/1
TABLET, FILM COATED ORAL
Qty: 30 TABLET | Refills: 5 | Status: SHIPPED | OUTPATIENT
Start: 2017-08-09 | End: 2018-01-17 | Stop reason: SDUPTHER

## 2017-08-11 ENCOUNTER — TELEPHONE (OUTPATIENT)
Dept: INTERNAL MEDICINE | Facility: CLINIC | Age: 82
End: 2017-08-11

## 2017-08-11 NOTE — TELEPHONE ENCOUNTER
Patient's son (Ant) is calling. He said he dropped off a paper yesterday for Dr. Bravo to sign and he just got a call this morning from our number. He said he figured it was regarding that paper. If it was you that called him, he'd like a callback.

## 2017-08-11 NOTE — TELEPHONE ENCOUNTER
I spoke with the patients son in law and informed him that the paperwork he has requested is ready for .

## 2017-08-11 NOTE — TELEPHONE ENCOUNTER
I had a message on my voicemail regarding Mrs. Green. The number for nurses registry has changed to [phone].    If you tried to fax something on the patient, could you resend it to them.     Thank you.

## 2017-09-08 RX ORDER — APIXABAN 2.5 MG/1
TABLET, FILM COATED ORAL
Qty: 60 TABLET | Refills: 5 | Status: SHIPPED | OUTPATIENT
Start: 2017-09-08 | End: 2017-09-14 | Stop reason: HOSPADM

## 2017-09-08 RX ORDER — CLOPIDOGREL BISULFATE 75 MG/1
TABLET ORAL
Qty: 30 TABLET | Refills: 4 | Status: SHIPPED | OUTPATIENT
Start: 2017-09-08 | End: 2017-09-14 | Stop reason: HOSPADM

## 2017-09-11 ENCOUNTER — APPOINTMENT (OUTPATIENT)
Dept: CT IMAGING | Facility: HOSPITAL | Age: 82
End: 2017-09-11

## 2017-09-11 ENCOUNTER — HOSPITAL ENCOUNTER (INPATIENT)
Facility: HOSPITAL | Age: 82
LOS: 3 days | Discharge: NURSING, REHAB OR LONG-TERM CARE | End: 2017-09-15
Attending: STUDENT IN AN ORGANIZED HEALTH CARE EDUCATION/TRAINING PROGRAM | Admitting: INTERNAL MEDICINE

## 2017-09-11 DIAGNOSIS — N39.0 URINARY TRACT INFECTION, SITE UNSPECIFIED: ICD-10-CM

## 2017-09-11 DIAGNOSIS — N13.30 HYDROURETERONEPHROSIS: ICD-10-CM

## 2017-09-11 DIAGNOSIS — Z74.09 IMPAIRED MOBILITY AND ADLS: ICD-10-CM

## 2017-09-11 DIAGNOSIS — Z74.09 IMPAIRED FUNCTIONAL MOBILITY, BALANCE, GAIT, AND ENDURANCE: ICD-10-CM

## 2017-09-11 DIAGNOSIS — N32.89 BLOOD CLOT IN BLADDER: Primary | ICD-10-CM

## 2017-09-11 DIAGNOSIS — Z78.9 IMPAIRED MOBILITY AND ADLS: ICD-10-CM

## 2017-09-11 LAB
ALBUMIN SERPL-MCNC: 4.1 G/DL (ref 3.5–5)
ALBUMIN/GLOB SERPL: 1.5 G/DL (ref 1–2)
ALP SERPL-CCNC: 142 U/L (ref 38–126)
ALT SERPL W P-5'-P-CCNC: 30 U/L (ref 13–69)
ANION GAP SERPL CALCULATED.3IONS-SCNC: 14.2 MMOL/L
AST SERPL-CCNC: 42 U/L (ref 15–46)
BACTERIA UR QL AUTO: ABNORMAL /HPF
BASOPHILS # BLD AUTO: 0.06 10*3/MM3 (ref 0–0.2)
BASOPHILS NFR BLD AUTO: 0.3 % (ref 0–2.5)
BILIRUB SERPL-MCNC: 1.1 MG/DL (ref 0.2–1.3)
BILIRUB UR QL STRIP: ABNORMAL
BUN BLD-MCNC: 15 MG/DL (ref 7–20)
BUN/CREAT SERPL: 18.8 (ref 7.1–23.5)
CALCIUM SPEC-SCNC: 9.5 MG/DL (ref 8.4–10.2)
CHLORIDE SERPL-SCNC: 94 MMOL/L (ref 98–107)
CLARITY UR: ABNORMAL
CO2 SERPL-SCNC: 25 MMOL/L (ref 26–30)
COLOR UR: ABNORMAL
CREAT BLD-MCNC: 0.8 MG/DL (ref 0.6–1.3)
DEPRECATED RDW RBC AUTO: 47.7 FL (ref 37–54)
EOSINOPHIL # BLD AUTO: 0 10*3/MM3 (ref 0–0.7)
EOSINOPHIL NFR BLD AUTO: 0 % (ref 0–7)
ERYTHROCYTE [DISTWIDTH] IN BLOOD BY AUTOMATED COUNT: 15.5 % (ref 11.5–14.5)
GFR SERPL CREATININE-BSD FRML MDRD: 68 ML/MIN/1.73
GLOBULIN UR ELPH-MCNC: 2.8 GM/DL
GLUCOSE BLD-MCNC: 129 MG/DL (ref 74–98)
GLUCOSE UR STRIP-MCNC: ABNORMAL MG/DL
HCT VFR BLD AUTO: 32.9 % (ref 37–47)
HGB BLD-MCNC: 10.4 G/DL (ref 12–16)
HGB UR QL STRIP.AUTO: ABNORMAL
HYALINE CASTS UR QL AUTO: ABNORMAL /LPF
IMM GRANULOCYTES # BLD: 0.11 10*3/MM3 (ref 0–0.06)
IMM GRANULOCYTES NFR BLD: 0.6 % (ref 0–0.6)
KETONES UR QL STRIP: ABNORMAL
LEUKOCYTE ESTERASE UR QL STRIP.AUTO: ABNORMAL
LYMPHOCYTES # BLD AUTO: 6.92 10*3/MM3 (ref 0.6–3.4)
LYMPHOCYTES NFR BLD AUTO: 38.1 % (ref 10–50)
MCH RBC QN AUTO: 26.9 PG (ref 27–31)
MCHC RBC AUTO-ENTMCNC: 31.6 G/DL (ref 30–37)
MCV RBC AUTO: 85.2 FL (ref 81–99)
MONOCYTES # BLD AUTO: 1.3 10*3/MM3 (ref 0–0.9)
MONOCYTES NFR BLD AUTO: 7.2 % (ref 0–12)
NEUTROPHILS # BLD AUTO: 9.78 10*3/MM3 (ref 2–6.9)
NEUTROPHILS NFR BLD AUTO: 53.8 % (ref 37–80)
NITRITE UR QL STRIP: POSITIVE
NRBC BLD MANUAL-RTO: 0 /100 WBC (ref 0–0)
PH UR STRIP.AUTO: >=9 [PH] (ref 5–8)
PLATELET # BLD AUTO: 165 10*3/MM3 (ref 130–400)
PMV BLD AUTO: 11 FL (ref 6–12)
POTASSIUM BLD-SCNC: 4.2 MMOL/L (ref 3.5–5.1)
PROT SERPL-MCNC: 6.9 G/DL (ref 6.3–8.2)
PROT UR QL STRIP: ABNORMAL
RBC # BLD AUTO: 3.86 10*6/MM3 (ref 4.2–5.4)
RBC # UR: ABNORMAL /HPF
REF LAB TEST METHOD: ABNORMAL
SODIUM BLD-SCNC: 129 MMOL/L (ref 137–145)
SP GR UR STRIP: 1.01 (ref 1–1.03)
SQUAMOUS #/AREA URNS HPF: ABNORMAL /HPF
UROBILINOGEN UR QL STRIP: ABNORMAL
WBC NRBC COR # BLD: 18.17 10*3/MM3 (ref 4.8–10.8)
WBC UR QL AUTO: ABNORMAL /HPF

## 2017-09-11 PROCEDURE — 87086 URINE CULTURE/COLONY COUNT: CPT | Performed by: PHYSICIAN ASSISTANT

## 2017-09-11 PROCEDURE — 51702 INSERT TEMP BLADDER CATH: CPT

## 2017-09-11 PROCEDURE — 99284 EMERGENCY DEPT VISIT MOD MDM: CPT

## 2017-09-11 PROCEDURE — 93005 ELECTROCARDIOGRAM TRACING: CPT | Performed by: PHYSICIAN ASSISTANT

## 2017-09-11 PROCEDURE — 87040 BLOOD CULTURE FOR BACTERIA: CPT | Performed by: PHYSICIAN ASSISTANT

## 2017-09-11 PROCEDURE — 74176 CT ABD & PELVIS W/O CONTRAST: CPT

## 2017-09-11 PROCEDURE — 83605 ASSAY OF LACTIC ACID: CPT | Performed by: PHYSICIAN ASSISTANT

## 2017-09-11 PROCEDURE — 80053 COMPREHEN METABOLIC PANEL: CPT | Performed by: PHYSICIAN ASSISTANT

## 2017-09-11 PROCEDURE — 85025 COMPLETE CBC W/AUTO DIFF WBC: CPT | Performed by: PHYSICIAN ASSISTANT

## 2017-09-11 PROCEDURE — 81001 URINALYSIS AUTO W/SCOPE: CPT | Performed by: PHYSICIAN ASSISTANT

## 2017-09-11 RX ORDER — SODIUM CHLORIDE 9 MG/ML
75 INJECTION, SOLUTION INTRAVENOUS CONTINUOUS
Status: DISCONTINUED | OUTPATIENT
Start: 2017-09-11 | End: 2017-09-13

## 2017-09-11 RX ORDER — ACETAMINOPHEN 500 MG
1000 TABLET ORAL ONCE
Status: COMPLETED | OUTPATIENT
Start: 2017-09-11 | End: 2017-09-11

## 2017-09-11 RX ORDER — CEPHALEXIN 250 MG/1
500 CAPSULE ORAL ONCE
Status: DISCONTINUED | OUTPATIENT
Start: 2017-09-11 | End: 2017-09-11

## 2017-09-11 RX ORDER — LOSARTAN POTASSIUM AND HYDROCHLOROTHIAZIDE 25; 100 MG/1; MG/1
1 TABLET ORAL DAILY
Status: ON HOLD | COMMUNITY
End: 2017-09-13

## 2017-09-11 RX ORDER — PHENAZOPYRIDINE HYDROCHLORIDE 100 MG/1
200 TABLET, FILM COATED ORAL ONCE
Status: COMPLETED | OUTPATIENT
Start: 2017-09-11 | End: 2017-09-11

## 2017-09-11 RX ORDER — NITROFURANTOIN MACROCRYSTALS 100 MG/1
100 CAPSULE ORAL 2 TIMES DAILY
Status: ON HOLD | COMMUNITY
End: 2017-09-13

## 2017-09-11 RX ADMIN — ACETAMINOPHEN 1000 MG: 500 TABLET, FILM COATED ORAL at 20:26

## 2017-09-11 RX ADMIN — PHENAZOPYRIDINE HYDROCHLORIDE 200 MG: 100 TABLET ORAL at 20:26

## 2017-09-12 ENCOUNTER — ANESTHESIA EVENT (OUTPATIENT)
Dept: PERIOP | Facility: HOSPITAL | Age: 82
End: 2017-09-12

## 2017-09-12 ENCOUNTER — APPOINTMENT (OUTPATIENT)
Dept: CARDIOLOGY | Facility: HOSPITAL | Age: 82
End: 2017-09-12
Attending: INTERNAL MEDICINE

## 2017-09-12 ENCOUNTER — ANESTHESIA (OUTPATIENT)
Dept: PERIOP | Facility: HOSPITAL | Age: 82
End: 2017-09-12

## 2017-09-12 LAB
ANION GAP SERPL CALCULATED.3IONS-SCNC: 11.5 MMOL/L
BASOPHILS # BLD AUTO: 0.04 10*3/MM3 (ref 0–0.2)
BASOPHILS NFR BLD AUTO: 0.3 % (ref 0–2.5)
BH CV ECHO MEAS - % IVS THICK: 17.9 %
BH CV ECHO MEAS - % LVPW THICK: 75 %
BH CV ECHO MEAS - AO ACC SLOPE: 1762 CM/SEC^2
BH CV ECHO MEAS - AO ACC TIME: 0.07 SEC
BH CV ECHO MEAS - AO ROOT AREA (BSA CORRECTED): 1.9
BH CV ECHO MEAS - AO ROOT AREA: 8.2 CM^2
BH CV ECHO MEAS - AO ROOT DIAM: 3.2 CM
BH CV ECHO MEAS - BSA(HAYCOCK): 1.7 M^2
BH CV ECHO MEAS - BSA: 1.7 M^2
BH CV ECHO MEAS - BZI_BMI: 23.5 KILOGRAMS/M^2
BH CV ECHO MEAS - BZI_METRIC_HEIGHT: 165.1 CM
BH CV ECHO MEAS - BZI_METRIC_WEIGHT: 64 KG
BH CV ECHO MEAS - CONTRAST EF 4CH: 64.4 ML/M^2
BH CV ECHO MEAS - EDV(CUBED): 102.2 ML
BH CV ECHO MEAS - EDV(MOD-SP4): 59 ML
BH CV ECHO MEAS - EDV(TEICH): 101.1 ML
BH CV ECHO MEAS - EF(CUBED): 70.4 %
BH CV ECHO MEAS - EF(MOD-SP4): 64.4 %
BH CV ECHO MEAS - EF(TEICH): 62 %
BH CV ECHO MEAS - ESV(CUBED): 30.3 ML
BH CV ECHO MEAS - ESV(MOD-SP4): 21 ML
BH CV ECHO MEAS - ESV(TEICH): 38.4 ML
BH CV ECHO MEAS - FS: 33.3 %
BH CV ECHO MEAS - IVS/LVPW: 1.4
BH CV ECHO MEAS - IVSD: 1 CM
BH CV ECHO MEAS - IVSS: 1.2 CM
BH CV ECHO MEAS - LA DIMENSION: 4.5 CM
BH CV ECHO MEAS - LA/AO: 1.4
BH CV ECHO MEAS - LV DIASTOLIC VOL/BSA (35-75): 34.6 ML/M^2
BH CV ECHO MEAS - LV MASS(C)D: 137.3 GRAMS
BH CV ECHO MEAS - LV MASS(C)DI: 80.5 GRAMS/M^2
BH CV ECHO MEAS - LV MASS(C)S: 122.3 GRAMS
BH CV ECHO MEAS - LV MASS(C)SI: 71.7 GRAMS/M^2
BH CV ECHO MEAS - LV MAX PG: 5.8 MMHG
BH CV ECHO MEAS - LV MEAN PG: 2.9 MMHG
BH CV ECHO MEAS - LV SYSTOLIC VOL/BSA (12-30): 12.3 ML/M^2
BH CV ECHO MEAS - LV V1 MAX: 120.3 CM/SEC
BH CV ECHO MEAS - LV V1 MEAN: 77.5 CM/SEC
BH CV ECHO MEAS - LV V1 VTI: 20.5 CM
BH CV ECHO MEAS - LVIDD: 4.7 CM
BH CV ECHO MEAS - LVIDS: 3.1 CM
BH CV ECHO MEAS - LVLD AP4: 6.1 CM
BH CV ECHO MEAS - LVLS AP4: 4.5 CM
BH CV ECHO MEAS - LVOT AREA (M): 2.8 CM^2
BH CV ECHO MEAS - LVOT AREA: 2.7 CM^2
BH CV ECHO MEAS - LVOT DIAM: 1.9 CM
BH CV ECHO MEAS - LVPWD: 0.73 CM
BH CV ECHO MEAS - LVPWS: 1.3 CM
BH CV ECHO MEAS - MV A MAX VEL: 66 CM/SEC
BH CV ECHO MEAS - MV DEC SLOPE: 350.5 CM/SEC^2
BH CV ECHO MEAS - MV E MAX VEL: 100 CM/SEC
BH CV ECHO MEAS - MV E/A: 1.5
BH CV ECHO MEAS - MV MAX PG: 3.6 MMHG
BH CV ECHO MEAS - MV MEAN PG: 1.4 MMHG
BH CV ECHO MEAS - MV P1/2T MAX VEL: 94.4 CM/SEC
BH CV ECHO MEAS - MV P1/2T: 78.9 MSEC
BH CV ECHO MEAS - MV V2 MAX: 95 CM/SEC
BH CV ECHO MEAS - MV V2 MEAN: 54.4 CM/SEC
BH CV ECHO MEAS - MV V2 VTI: 16.7 CM
BH CV ECHO MEAS - MVA P1/2T LCG: 2.3 CM^2
BH CV ECHO MEAS - MVA(P1/2T): 2.8 CM^2
BH CV ECHO MEAS - MVA(VTI): 3.3 CM^2
BH CV ECHO MEAS - PA ACC SLOPE: 1787 CM/SEC^2
BH CV ECHO MEAS - PA ACC TIME: 0.05 SEC
BH CV ECHO MEAS - PA MAX PG: 4.8 MMHG
BH CV ECHO MEAS - PA MEAN PG: 2.6 MMHG
BH CV ECHO MEAS - PA PR(ACCEL): 55.2 MMHG
BH CV ECHO MEAS - PA V2 MAX: 109.2 CM/SEC
BH CV ECHO MEAS - PA V2 MEAN: 74.9 CM/SEC
BH CV ECHO MEAS - PA V2 VTI: 25.6 CM
BH CV ECHO MEAS - PI END-D VEL: 213.4 CM/SEC
BH CV ECHO MEAS - SI(CUBED): 42.2 ML/M^2
BH CV ECHO MEAS - SI(LVOT): 32.7 ML/M^2
BH CV ECHO MEAS - SI(MOD-SP4): 22.3 ML/M^2
BH CV ECHO MEAS - SI(TEICH): 36.8 ML/M^2
BH CV ECHO MEAS - SV(CUBED): 71.9 ML
BH CV ECHO MEAS - SV(LVOT): 55.7 ML
BH CV ECHO MEAS - SV(MOD-SP4): 38 ML
BH CV ECHO MEAS - SV(TEICH): 62.7 ML
BH CV ECHO MEAS - TR MAX VEL: 402.6 CM/SEC
BUN BLD-MCNC: 14 MG/DL (ref 7–20)
BUN/CREAT SERPL: 20 (ref 7.1–23.5)
CALCIUM SPEC-SCNC: 9 MG/DL (ref 8.4–10.2)
CHLORIDE SERPL-SCNC: 99 MMOL/L (ref 98–107)
CO2 SERPL-SCNC: 23 MMOL/L (ref 26–30)
CREAT BLD-MCNC: 0.7 MG/DL (ref 0.6–1.3)
D-LACTATE SERPL-SCNC: 0.9 MMOL/L (ref 0.5–2)
DEPRECATED RDW RBC AUTO: 49.1 FL (ref 37–54)
EOSINOPHIL # BLD AUTO: 0 10*3/MM3 (ref 0–0.7)
EOSINOPHIL NFR BLD AUTO: 0 % (ref 0–7)
ERYTHROCYTE [DISTWIDTH] IN BLOOD BY AUTOMATED COUNT: 15.5 % (ref 11.5–14.5)
GFR SERPL CREATININE-BSD FRML MDRD: 79 ML/MIN/1.73
GLUCOSE BLD-MCNC: 115 MG/DL (ref 74–98)
HCT VFR BLD AUTO: 27.4 % (ref 37–47)
HGB BLD-MCNC: 8.6 G/DL (ref 12–16)
IMM GRANULOCYTES # BLD: 0.09 10*3/MM3 (ref 0–0.06)
IMM GRANULOCYTES NFR BLD: 0.8 % (ref 0–0.6)
LYMPHOCYTES # BLD AUTO: 3.95 10*3/MM3 (ref 0.6–3.4)
LYMPHOCYTES NFR BLD AUTO: 33.6 % (ref 10–50)
MCH RBC QN AUTO: 27 PG (ref 27–31)
MCHC RBC AUTO-ENTMCNC: 31.4 G/DL (ref 30–37)
MCV RBC AUTO: 86.2 FL (ref 81–99)
MONOCYTES # BLD AUTO: 0.87 10*3/MM3 (ref 0–0.9)
MONOCYTES NFR BLD AUTO: 7.4 % (ref 0–12)
NEUTROPHILS # BLD AUTO: 6.79 10*3/MM3 (ref 2–6.9)
NEUTROPHILS NFR BLD AUTO: 57.9 % (ref 37–80)
NRBC BLD MANUAL-RTO: 0 /100 WBC (ref 0–0)
PLATELET # BLD AUTO: 103 10*3/MM3 (ref 130–400)
PMV BLD AUTO: 11.5 FL (ref 6–12)
POTASSIUM BLD-SCNC: 3.5 MMOL/L (ref 3.5–5.1)
RBC # BLD AUTO: 3.18 10*6/MM3 (ref 4.2–5.4)
SODIUM BLD-SCNC: 130 MMOL/L (ref 137–145)
WBC NRBC COR # BLD: 11.74 10*3/MM3 (ref 4.8–10.8)

## 2017-09-12 PROCEDURE — 25010000002 DEXAMETHASONE PER 1 MG: Performed by: NURSE ANESTHETIST, CERTIFIED REGISTERED

## 2017-09-12 PROCEDURE — C1758 CATHETER, URETERAL: HCPCS | Performed by: UROLOGY

## 2017-09-12 PROCEDURE — 97162 PT EVAL MOD COMPLEX 30 MIN: CPT

## 2017-09-12 PROCEDURE — 25010000002 PROPOFOL 200 MG/20ML EMULSION: Performed by: NURSE ANESTHETIST, CERTIFIED REGISTERED

## 2017-09-12 PROCEDURE — 93306 TTE W/DOPPLER COMPLETE: CPT

## 2017-09-12 PROCEDURE — 99233 SBSQ HOSP IP/OBS HIGH 50: CPT | Performed by: INTERNAL MEDICINE

## 2017-09-12 PROCEDURE — 25010000002 FENTANYL CITRATE (PF) 250 MCG/5ML SOLUTION: Performed by: NURSE ANESTHETIST, CERTIFIED REGISTERED

## 2017-09-12 PROCEDURE — 94799 UNLISTED PULMONARY SVC/PX: CPT

## 2017-09-12 PROCEDURE — 25010000002 LEVOFLOXACIN PER 250 MG: Performed by: UROLOGY

## 2017-09-12 PROCEDURE — 88305 TISSUE EXAM BY PATHOLOGIST: CPT | Performed by: UROLOGY

## 2017-09-12 PROCEDURE — 0TCB8ZZ EXTIRPATION OF MATTER FROM BLADDER, VIA NATURAL OR ARTIFICIAL OPENING ENDOSCOPIC: ICD-10-PCS | Performed by: UROLOGY

## 2017-09-12 PROCEDURE — 52204 CYSTOSCOPY W/BIOPSY(S): CPT | Performed by: UROLOGY

## 2017-09-12 PROCEDURE — 85025 COMPLETE CBC W/AUTO DIFF WBC: CPT | Performed by: INTERNAL MEDICINE

## 2017-09-12 PROCEDURE — 25010000002 AZITHROMYCIN: Performed by: PHYSICIAN ASSISTANT

## 2017-09-12 PROCEDURE — 25010000002 CEFTRIAXONE: Performed by: PHYSICIAN ASSISTANT

## 2017-09-12 PROCEDURE — 25010000002 SUCCINYLCHOLINE PER 20 MG: Performed by: NURSE ANESTHETIST, CERTIFIED REGISTERED

## 2017-09-12 PROCEDURE — 0 IOTHALAMATE 60 % SOLUTION: Performed by: UROLOGY

## 2017-09-12 PROCEDURE — 80048 BASIC METABOLIC PNL TOTAL CA: CPT | Performed by: INTERNAL MEDICINE

## 2017-09-12 PROCEDURE — 99222 1ST HOSP IP/OBS MODERATE 55: CPT | Performed by: UROLOGY

## 2017-09-12 PROCEDURE — 99223 1ST HOSP IP/OBS HIGH 75: CPT | Performed by: INTERNAL MEDICINE

## 2017-09-12 PROCEDURE — 25010000002 ONDANSETRON PER 1 MG: Performed by: INTERNAL MEDICINE

## 2017-09-12 PROCEDURE — 51600 INJECTION FOR BLADDER X-RAY: CPT | Performed by: UROLOGY

## 2017-09-12 PROCEDURE — 0TBB8ZX EXCISION OF BLADDER, VIA NATURAL OR ARTIFICIAL OPENING ENDOSCOPIC, DIAGNOSTIC: ICD-10-PCS | Performed by: UROLOGY

## 2017-09-12 PROCEDURE — 97165 OT EVAL LOW COMPLEX 30 MIN: CPT

## 2017-09-12 PROCEDURE — C1769 GUIDE WIRE: HCPCS | Performed by: UROLOGY

## 2017-09-12 RX ORDER — SODIUM CHLORIDE, SODIUM LACTATE, POTASSIUM CHLORIDE, CALCIUM CHLORIDE 600; 310; 30; 20 MG/100ML; MG/100ML; MG/100ML; MG/100ML
1000 INJECTION, SOLUTION INTRAVENOUS CONTINUOUS PRN
Status: DISCONTINUED | OUTPATIENT
Start: 2017-09-12 | End: 2017-09-12 | Stop reason: HOSPADM

## 2017-09-12 RX ORDER — METOPROLOL TARTRATE 50 MG/1
50 TABLET, FILM COATED ORAL EVERY 12 HOURS SCHEDULED
Status: DISCONTINUED | OUTPATIENT
Start: 2017-09-12 | End: 2017-09-15 | Stop reason: HOSPADM

## 2017-09-12 RX ORDER — LEVOFLOXACIN 5 MG/ML
500 INJECTION, SOLUTION INTRAVENOUS
Status: DISCONTINUED | OUTPATIENT
Start: 2017-09-12 | End: 2017-09-12 | Stop reason: HOSPADM

## 2017-09-12 RX ORDER — ONDANSETRON 2 MG/ML
4 INJECTION INTRAMUSCULAR; INTRAVENOUS EVERY 6 HOURS PRN
Status: DISCONTINUED | OUTPATIENT
Start: 2017-09-12 | End: 2017-09-15 | Stop reason: HOSPADM

## 2017-09-12 RX ORDER — SODIUM CHLORIDE 0.9 % (FLUSH) 0.9 %
1-10 SYRINGE (ML) INJECTION AS NEEDED
Status: DISCONTINUED | OUTPATIENT
Start: 2017-09-12 | End: 2017-09-15 | Stop reason: HOSPADM

## 2017-09-12 RX ORDER — ACETAMINOPHEN 325 MG/1
650 TABLET ORAL EVERY 4 HOURS PRN
Status: DISCONTINUED | OUTPATIENT
Start: 2017-09-12 | End: 2017-09-15 | Stop reason: HOSPADM

## 2017-09-12 RX ORDER — NALOXONE HCL 0.4 MG/ML
0.4 VIAL (ML) INJECTION
Status: DISCONTINUED | OUTPATIENT
Start: 2017-09-12 | End: 2017-09-15 | Stop reason: HOSPADM

## 2017-09-12 RX ORDER — LATANOPROST 50 UG/ML
1 SOLUTION/ DROPS OPHTHALMIC NIGHTLY
Status: DISCONTINUED | OUTPATIENT
Start: 2017-09-12 | End: 2017-09-15 | Stop reason: HOSPADM

## 2017-09-12 RX ORDER — LOSARTAN POTASSIUM AND HYDROCHLOROTHIAZIDE 12.5; 5 MG/1; MG/1
2 TABLET ORAL
Status: DISCONTINUED | OUTPATIENT
Start: 2017-09-12 | End: 2017-09-12

## 2017-09-12 RX ORDER — LEVOTHYROXINE SODIUM 0.1 MG/1
100 TABLET ORAL
Status: DISCONTINUED | OUTPATIENT
Start: 2017-09-12 | End: 2017-09-15 | Stop reason: HOSPADM

## 2017-09-12 RX ORDER — ATORVASTATIN CALCIUM 40 MG/1
40 TABLET, FILM COATED ORAL DAILY
Status: DISCONTINUED | OUTPATIENT
Start: 2017-09-12 | End: 2017-09-15 | Stop reason: HOSPADM

## 2017-09-12 RX ORDER — MORPHINE SULFATE 2 MG/ML
2 INJECTION, SOLUTION INTRAMUSCULAR; INTRAVENOUS
Status: DISCONTINUED | OUTPATIENT
Start: 2017-09-12 | End: 2017-09-12 | Stop reason: HOSPADM

## 2017-09-12 RX ORDER — LEVOFLOXACIN 5 MG/ML
500 INJECTION, SOLUTION INTRAVENOUS ONCE
Status: DISCONTINUED | OUTPATIENT
Start: 2017-09-12 | End: 2017-09-12

## 2017-09-12 RX ORDER — LEVOFLOXACIN 5 MG/ML
500 INJECTION, SOLUTION INTRAVENOUS ONCE
Status: CANCELLED | OUTPATIENT
Start: 2017-09-12 | End: 2017-09-12

## 2017-09-12 RX ORDER — PROPOFOL 10 MG/ML
INJECTION, EMULSION INTRAVENOUS AS NEEDED
Status: DISCONTINUED | OUTPATIENT
Start: 2017-09-12 | End: 2017-09-12 | Stop reason: SURG

## 2017-09-12 RX ORDER — MEPERIDINE HYDROCHLORIDE 50 MG/ML
50 INJECTION INTRAMUSCULAR; INTRAVENOUS; SUBCUTANEOUS ONCE
Status: DISCONTINUED | OUTPATIENT
Start: 2017-09-12 | End: 2017-09-12 | Stop reason: HOSPADM

## 2017-09-12 RX ORDER — DEXAMETHASONE SODIUM PHOSPHATE 4 MG/ML
INJECTION, SOLUTION INTRA-ARTICULAR; INTRALESIONAL; INTRAMUSCULAR; INTRAVENOUS; SOFT TISSUE AS NEEDED
Status: DISCONTINUED | OUTPATIENT
Start: 2017-09-12 | End: 2017-09-12 | Stop reason: SURG

## 2017-09-12 RX ORDER — ONDANSETRON 2 MG/ML
4 INJECTION INTRAMUSCULAR; INTRAVENOUS ONCE
Status: DISCONTINUED | OUTPATIENT
Start: 2017-09-12 | End: 2017-09-12 | Stop reason: HOSPADM

## 2017-09-12 RX ORDER — MOEXIPRIL HCL 15 MG
7.5 TABLET ORAL NIGHTLY
Status: DISCONTINUED | OUTPATIENT
Start: 2017-09-12 | End: 2017-09-12

## 2017-09-12 RX ORDER — FENTANYL CITRATE 50 UG/ML
INJECTION, SOLUTION INTRAMUSCULAR; INTRAVENOUS AS NEEDED
Status: DISCONTINUED | OUTPATIENT
Start: 2017-09-12 | End: 2017-09-12 | Stop reason: SURG

## 2017-09-12 RX ORDER — SODIUM CHLORIDE 0.9 % (FLUSH) 0.9 %
3 SYRINGE (ML) INJECTION AS NEEDED
Status: DISCONTINUED | OUTPATIENT
Start: 2017-09-12 | End: 2017-09-12 | Stop reason: HOSPADM

## 2017-09-12 RX ORDER — WHITE PETROLATUM 450 MG/G
1 STICK TOPICAL
Status: CANCELLED | OUTPATIENT
Start: 2017-09-12

## 2017-09-12 RX ORDER — MAGNESIUM HYDROXIDE 1200 MG/15ML
LIQUID ORAL AS NEEDED
Status: DISCONTINUED | OUTPATIENT
Start: 2017-09-12 | End: 2017-09-12 | Stop reason: HOSPADM

## 2017-09-12 RX ORDER — MORPHINE SULFATE 2 MG/ML
2 INJECTION, SOLUTION INTRAMUSCULAR; INTRAVENOUS EVERY 4 HOURS PRN
Status: DISCONTINUED | OUTPATIENT
Start: 2017-09-12 | End: 2017-09-15 | Stop reason: HOSPADM

## 2017-09-12 RX ORDER — PANTOPRAZOLE SODIUM 40 MG/1
40 TABLET, DELAYED RELEASE ORAL EVERY MORNING
Status: DISCONTINUED | OUTPATIENT
Start: 2017-09-12 | End: 2017-09-15 | Stop reason: HOSPADM

## 2017-09-12 RX ORDER — SUCCINYLCHOLINE CHLORIDE 20 MG/ML
INJECTION INTRAMUSCULAR; INTRAVENOUS AS NEEDED
Status: DISCONTINUED | OUTPATIENT
Start: 2017-09-12 | End: 2017-09-12 | Stop reason: SURG

## 2017-09-12 RX ORDER — AMLODIPINE BESYLATE 5 MG/1
2.5 TABLET ORAL
Status: DISCONTINUED | OUTPATIENT
Start: 2017-09-12 | End: 2017-09-15 | Stop reason: HOSPADM

## 2017-09-12 RX ORDER — MIRTAZAPINE 15 MG/1
30 TABLET, FILM COATED ORAL NIGHTLY
Status: DISCONTINUED | OUTPATIENT
Start: 2017-09-12 | End: 2017-09-15 | Stop reason: HOSPADM

## 2017-09-12 RX ORDER — IPRATROPIUM BROMIDE AND ALBUTEROL SULFATE 2.5; .5 MG/3ML; MG/3ML
3 SOLUTION RESPIRATORY (INHALATION) EVERY 6 HOURS PRN
Status: DISCONTINUED | OUTPATIENT
Start: 2017-09-12 | End: 2017-09-15 | Stop reason: HOSPADM

## 2017-09-12 RX ORDER — TRAZODONE HYDROCHLORIDE 50 MG/1
50 TABLET ORAL NIGHTLY PRN
Status: DISCONTINUED | OUTPATIENT
Start: 2017-09-12 | End: 2017-09-15 | Stop reason: HOSPADM

## 2017-09-12 RX ADMIN — METOPROLOL TARTRATE 50 MG: 50 TABLET ORAL at 02:40

## 2017-09-12 RX ADMIN — LATANOPROST 1 DROP: 50 SOLUTION OPHTHALMIC at 02:40

## 2017-09-12 RX ADMIN — LEVOTHYROXINE SODIUM 100 MCG: 100 TABLET ORAL at 06:01

## 2017-09-12 RX ADMIN — Medication 200 MCG: at 14:51

## 2017-09-12 RX ADMIN — PROPOFOL 120 MG: 10 INJECTION, EMULSION INTRAVENOUS at 14:47

## 2017-09-12 RX ADMIN — FENTANYL CITRATE 50 MCG: 50 INJECTION, SOLUTION INTRAMUSCULAR; INTRAVENOUS at 15:14

## 2017-09-12 RX ADMIN — PANTOPRAZOLE SODIUM 40 MG: 40 TABLET, DELAYED RELEASE ORAL at 06:01

## 2017-09-12 RX ADMIN — LEVOFLOXACIN 500 MG: 5 INJECTION, SOLUTION INTRAVENOUS at 14:44

## 2017-09-12 RX ADMIN — CEFTRIAXONE 1 G: 1 INJECTION, POWDER, FOR SOLUTION INTRAMUSCULAR; INTRAVENOUS at 00:25

## 2017-09-12 RX ADMIN — ONDANSETRON 4 MG: 2 INJECTION INTRAMUSCULAR; INTRAVENOUS at 14:47

## 2017-09-12 RX ADMIN — ACETAMINOPHEN 650 MG: 325 TABLET, FILM COATED ORAL at 05:56

## 2017-09-12 RX ADMIN — FENTANYL CITRATE 50 MCG: 50 INJECTION, SOLUTION INTRAMUSCULAR; INTRAVENOUS at 14:48

## 2017-09-12 RX ADMIN — SODIUM CHLORIDE 75 ML/HR: 9 INJECTION, SOLUTION INTRAVENOUS at 01:06

## 2017-09-12 RX ADMIN — DEXAMETHASONE SODIUM PHOSPHATE 4 MG: 4 INJECTION, SOLUTION INTRAMUSCULAR; INTRAVENOUS at 14:47

## 2017-09-12 RX ADMIN — MOEXIPRIL HYDROCHLORIDE: 15 TABLET, FILM COATED ORAL at 02:41

## 2017-09-12 RX ADMIN — METOPROLOL TARTRATE 50 MG: 50 TABLET ORAL at 11:00

## 2017-09-12 RX ADMIN — ATORVASTATIN CALCIUM 40 MG: 40 TABLET, FILM COATED ORAL at 11:00

## 2017-09-12 RX ADMIN — Medication: at 11:00

## 2017-09-12 RX ADMIN — AZITHROMYCIN 500 MG: 500 INJECTION, POWDER, LYOPHILIZED, FOR SOLUTION INTRAVENOUS at 01:00

## 2017-09-12 RX ADMIN — TRAZODONE HYDROCHLORIDE 50 MG: 50 TABLET ORAL at 02:40

## 2017-09-12 RX ADMIN — LIDOCAINE HYDROCHLORIDE 60 MG: 20 INJECTION, SOLUTION INTRAVENOUS at 14:47

## 2017-09-12 RX ADMIN — SUCCINYLCHOLINE CHLORIDE 100 MG: 20 INJECTION, SOLUTION INTRAMUSCULAR; INTRAVENOUS at 14:47

## 2017-09-12 RX ADMIN — SODIUM CHLORIDE, POTASSIUM CHLORIDE, SODIUM LACTATE AND CALCIUM CHLORIDE: 600; 310; 30; 20 INJECTION, SOLUTION INTRAVENOUS at 14:50

## 2017-09-12 RX ADMIN — MIRTAZAPINE 30 MG: 15 TABLET, FILM COATED ORAL at 02:40

## 2017-09-12 RX ADMIN — SODIUM CHLORIDE, POTASSIUM CHLORIDE, SODIUM LACTATE AND CALCIUM CHLORIDE 500 ML: 600; 310; 30; 20 INJECTION, SOLUTION INTRAVENOUS at 14:02

## 2017-09-12 RX ADMIN — LOSARTAN POTASSIUM AND HYDROCHLOROTHIAZIDE 2 TABLET: 50; 12.5 TABLET, FILM COATED ORAL at 11:00

## 2017-09-12 NOTE — ANESTHESIA PROCEDURE NOTES
Airway  Urgency: elective    Airway not difficult    General Information and Staff    Patient location during procedure: OR  CRNA: LOPEZ CLARKE    Indications and Patient Condition  Indications for airway management: airway protection    Preoxygenated: yes  Mask difficulty assessment: 1 - vent by mask    Final Airway Details  Final airway type: endotracheal airway      Successful airway: ETT  Cuffed: yes   Successful intubation technique: direct laryngoscopy  Endotracheal tube insertion site: oral  Blade: Faith  Blade size: #3  ETT size: 7.0 mm  Cormack-Lehane Classification: grade I - full view of glottis  Placement verified by: chest auscultation and capnometry   Measured from: lips  ETT to lips (cm): 21  Number of attempts at approach: 1

## 2017-09-12 NOTE — ANESTHESIA POSTPROCEDURE EVALUATION
Patient: Jose M Green    Procedure Summary     Date Anesthesia Start Anesthesia Stop Room / Location    09/12/17 1441 1532  [facility]       Procedure Diagnosis Surgeon Provider    CYSTOSCOPY CYSTOGRAM (N/A ); CYSTOSCOPY RETROGRADE PYELOGRAM (Right ); CYSTOSCOPY BLADDER BIOPSY (N/A ); CYSTOSCOPY TRANSURETHRAL RESECTION OF BLADDER TUMOR (N/A ) Blood clot in bladder  (Blood clot in bladder [N32.89]) MD José Zapata CRNA          Anesthesia Type: general, spinal  Last vitals   BP        Temp        Pulse       Resp        SpO2          Post Anesthesia Care and Evaluation    Patient location during evaluation: PACU  Patient participation: complete - patient participated  Level of consciousness: awake  Pain score: 3  Pain management: adequate  Airway patency: patent  Anesthetic complications: No anesthetic complications  PONV Status: controlled  Cardiovascular status: acceptable and stable  Respiratory status: acceptable and face mask  Hydration status: acceptable

## 2017-09-12 NOTE — PROGRESS NOTES
[facility]    Name:  Jose M Green   Age:  86 y.o.  Sex:  female  :  1931  MRN:  [id number]   Visit Number:  [id number]  Admission Date:  2017  Date Of Service:  17  Primary Care Physician:  Speedy Bravo MD     LOS: 0 days :  Patient Care Team:  Speedy Bravo MD as PCP - General  Speedy Bravo MD as PCP - Family Medicine  Speedy Bravo MD as PCP - Claims Attributed  Speedy Bravo MD:    Chief Complaint:      Hematuria, fever    Subjective / Interval History:     Currently awake and sitting up in chair is on IV fluids awaiting cystoscopy. Has had fever through the night.    Review of Systems:   Review of Systems   Constitutional: Negative.  Negative for activity change, appetite change, fatigue and fever.   HENT: Negative for congestion, ear discharge, ear pain and trouble swallowing.    Eyes: Negative for photophobia and visual disturbance.   Respiratory: Negative for cough and shortness of breath.    Cardiovascular: Positive for leg swelling. Negative for chest pain and palpitations.   Gastrointestinal: Positive for abdominal pain. Negative for abdominal distention, constipation, diarrhea, nausea and vomiting.   Endocrine: Negative.    Genitourinary: Positive for frequency, hematuria and urgency. Negative for dysuria.   Musculoskeletal: Negative for arthralgias, back pain, joint swelling and myalgias.   Skin: Negative for color change and rash.   Allergic/Immunologic: Negative.    Neurological: Negative for dizziness, weakness, light-headedness and headaches.   Hematological: Negative for adenopathy. Does not bruise/bleed easily.   Psychiatric/Behavioral: Positive for sleep disturbance. Negative for agitation, confusion and dysphoric mood. The patient is nervous/anxious.          Vital Signs:    Temp:  [98.3 °F (36.8 °C)-103.1 °F (39.5 °C)] 98.3 °F (36.8 °C)  Heart Rate:  [] 115  Resp:  [18-20] 20  BP: (144-178)/(55-98)  172/55    Intake and output:      Intake/Output Summary (Last 24 hours) at 09/12/17 1116  Last data filed at 09/12/17 0108   Gross per 24 hour   Intake              100 ml   Output              300 ml   Net             -200 ml          Physical Examination:  Physical Exam   Constitutional: She is oriented to person, place, and time. She appears well-developed and well-nourished. No distress.   HENT:   Nose: Nose normal.   Mouth/Throat: Oropharynx is clear and moist.   Eyes: Conjunctivae and EOM are normal. No scleral icterus.   Neck: No tracheal deviation present. No thyromegaly present.   Cardiovascular: Normal rate and regular rhythm.  Exam reveals no friction rub.    No murmur heard.  Pulmonary/Chest: No respiratory distress. She has no wheezes. She has no rales.   Abdominal: Soft. She exhibits no distension and no mass. There is no tenderness. There is no guarding.   Genitourinary:   Genitourinary Comments: Blood-tinged urine noted in Ross catheter   Musculoskeletal: Normal range of motion. She exhibits tenderness and deformity.   Lymphadenopathy:     She has no cervical adenopathy.   Neurological: She is alert and oriented to person, place, and time. She has normal reflexes. No cranial nerve deficit. Coordination normal.   Skin: Skin is warm and dry. No rash noted. No erythema.   Psychiatric: She has a normal mood and affect. Her behavior is normal. Judgment and thought content normal.         Laboratory results:    Lab Results (last 24 hours)     Procedure Component Value Units Date/Time    CBC & Differential [[id number]] Collected:  09/11/17 2044    Specimen:  Blood Updated:  09/11/17 2055    Narrative:       The following orders were created for panel order CBC & Differential.  Procedure                               Abnormality         Status                     ---------                               -----------         ------                     CBC Auto Differential[[id number]]        Abnormal             Final result                 Please view results for these tests on the individual orders.    CBC Auto Differential [[id number]]  (Abnormal) Collected:  09/11/17 2044    Specimen:  Blood Updated:  09/11/17 2055     WBC 18.17 (H) 10*3/mm3      RBC 3.86 (L) 10*6/mm3      Hemoglobin 10.4 (L) g/dL      Hematocrit 32.9 (L) %      MCV 85.2 fL      MCH 26.9 (L) pg      MCHC 31.6 g/dL      RDW 15.5 (H) %      RDW-SD 47.7 fl      MPV 11.0 fL      Platelets 165 10*3/mm3      Neutrophil % 53.8 %      Lymphocyte % 38.1 %      Monocyte % 7.2 %      Eosinophil % 0.0 %      Basophil % 0.3 %      Immature Grans % 0.6 %      Neutrophils, Absolute 9.78 (H) 10*3/mm3      Lymphocytes, Absolute 6.92 (H) 10*3/mm3      Monocytes, Absolute 1.30 (H) 10*3/mm3      Eosinophils, Absolute 0.00 10*3/mm3      Basophils, Absolute 0.06 10*3/mm3      Immature Grans, Absolute 0.11 (H) 10*3/mm3      nRBC 0.0 /100 WBC     Comprehensive Metabolic Panel [[id number]]  (Abnormal) Collected:  09/11/17 2044    Specimen:  Blood Updated:  09/11/17 2119     Glucose 129 (H) mg/dL      BUN 15 mg/dL       Specimen hemolyzed. Results may be affected.        Creatinine 0.80 mg/dL      Sodium 129 (L) mmol/L      Potassium 4.2 mmol/L       Specimen hemolyzed.  Results may be affected.        Chloride 94 (L) mmol/L      CO2 25.0 (L) mmol/L      Calcium 9.5 mg/dL      Total Protein 6.9 g/dL      Albumin 4.10 g/dL      ALT (SGPT) 30 U/L       Specimen hemolyzed.  Results may be affected.        AST (SGOT) 42 U/L       Specimen hemolyzed.  Results may be affected.        Alkaline Phosphatase 142 (H) U/L       Specimen hemolyzed. Results may be affected.        Total Bilirubin 1.1 mg/dL      eGFR Non African Amer 68 mL/min/1.73      Globulin 2.8 gm/dL      A/G Ratio 1.5 g/dL      BUN/Creatinine Ratio 18.8     Anion Gap 14.2 mmol/L     Narrative:       The MDRD GFR formula is only valid for adults with stable renal function between ages 18 and 70.    Urine Culture [[id number]]  Collected:  09/11/17 2044    Specimen:  Urine from Urine, Catheter Updated:  09/11/17 2125    Urinalysis With / Culture If Indicated [[id number]]  (Abnormal) Collected:  09/11/17 2044    Specimen:  Urine from Urine, Catheter Updated:  09/11/17 2130     Color, UA Red (A)     Appearance, UA Cloudy (A)     pH, UA >=9.0 (H)     Specific Gravity, UA 1.010     Glucose,  mg/dL (2+) (A)     Ketones, UA >=160 mg/dL (4+) (A)     Bilirubin, UA Large (3+) (A)     Blood, UA Large (3+) (A)     Protein, UA >=300 mg/dL (3+) (A)     Leuk Esterase, UA Large (3+) (A)     Nitrite, UA Positive (A)     Urobilinogen, UA >=8.0 E.U./dL (A)    Urinalysis, Microscopic Only [[id number]]  (Abnormal) Collected:  09/11/17 2044    Specimen:  Urine from Urine, Catheter Updated:  09/11/17 2130     RBC, UA Too Numerous to Count (A) /HPF      WBC, UA  /HPF      Unable to determine due to loaded field (A)     Bacteria, UA  /HPF      Unable to determine due to loaded field (A)     Squamous Epithelial Cells, UA  /HPF      Unable to determine due to loaded field (A)     Hyaline Casts, UA  /LPF      Unable to determine due to loaded field     Methodology Manual Light Microscopy    Blood Culture [[id number]] Collected:  09/11/17 2341    Specimen:  Blood from Arm, Left Updated:  09/11/17 2347    Blood Culture [[id number]] Collected:  09/11/17 2341    Specimen:  Blood from Arm, Right Updated:  09/11/17 2347    Lactic Acid, Plasma [[id number]]  (Normal) Collected:  09/11/17 2347    Specimen:  Blood Updated:  09/12/17 0009     Lactate 0.9 mmol/L     CBC Auto Differential [[id number]]  (Abnormal) Collected:  09/12/17 0529    Specimen:  Blood Updated:  09/12/17 0619     WBC 11.74 (H) 10*3/mm3      RBC 3.18 (L) 10*6/mm3      Hemoglobin 8.6 (L) g/dL      Hematocrit 27.4 (L) %      MCV 86.2 fL      MCH 27.0 pg      MCHC 31.4 g/dL      RDW 15.5 (H) %      RDW-SD 49.1 fl      MPV 11.5 fL      Platelets 103 (L) 10*3/mm3      Neutrophil % 57.9 %      Lymphocyte % 33.6 %       Monocyte % 7.4 %      Eosinophil % 0.0 %      Basophil % 0.3 %      Immature Grans % 0.8 (H) %      Neutrophils, Absolute 6.79 10*3/mm3      Lymphocytes, Absolute 3.95 (H) 10*3/mm3      Monocytes, Absolute 0.87 10*3/mm3      Eosinophils, Absolute 0.00 10*3/mm3      Basophils, Absolute 0.04 10*3/mm3      Immature Grans, Absolute 0.09 (H) 10*3/mm3      nRBC 0.0 /100 WBC     Basic Metabolic Panel [[id number]]  (Abnormal) Collected:  09/12/17 0529    Specimen:  Blood Updated:  09/12/17 0620     Glucose 115 (H) mg/dL      BUN 14 mg/dL      Creatinine 0.70 mg/dL      Sodium 130 (L) mmol/L      Potassium 3.5 mmol/L      Chloride 99 mmol/L      CO2 23.0 (L) mmol/L      Calcium 9.0 mg/dL      eGFR Non African Amer 79 mL/min/1.73      BUN/Creatinine Ratio 20.0     Anion Gap 11.5 mmol/L     Narrative:       The MDRD GFR formula is only valid for adults with stable renal function between ages 18 and 70.        I have reviewed the patient's laboratory results.    Radiology results:    Imaging Results (last 24 hours)     Procedure Component Value Units Date/Time    CT Abdomen Pelvis Without Contrast [[id number]] Collected:  09/11/17 2138     Updated:  09/11/17 2140    Narrative:       FINAL REPORT    CLINICAL HISTORY:  BILAT FLANK PAIN, FEVER, HEMATURIA.    FINDINGS:  Multiple contiguous transaxial slices through the abdomen and pelvis were obtained without the intravenous administration of contrast with coronal reformatted images.    The bladder is enlarged with irregular wall thickening and prominent stranding of the adjacent fat. There is    High attenuation luminal content most consistent with bladder hemorrhage. This may be secondary to a hemorrhagic cystitis or a bleeding neoplasm which is not really identified. Recommend cystoscopy. There is moderate to severe right hydroureteronephrosis   and mild left hydroureteronephrosis. Findings are likely secondary to mechanical obstruction.    There is bilateral lower lobe  atelectasis with moderate pleural effusions. There is a pericardial effusion. The gallbladder is dilated with a moderate stone. The liver, spleen, pancreas, adrenal glands appear normal. The aorta and iliac arteries are   calcified. The bowel gas pattern is nonobstructive. The appendix is not visualized but there are no secondary signs suggest appendicitis.    Impression:    Irregular bladder wall thickening with adjacent fat stranding in luminal hemorrhage. Recommend cystoscopy    Bilateral hydroureteronephrosis suggesting mechanical obstruction    Bilateral pleural effusions, pericardial effusion and airspace disease    Cholelithiasis      Impression:       Authenticated by John Harper MD on 09/11/2017 09:38:46 PM          I have reviewed the patient's radiology reports.    Medication Review:     I have reviewed the patients active and prn medications.       Assessment & Plan:  Active Problems:    Chronic lymphocytic leukemia has been following up with oncology. Follow CBC platelet count and RBC count slightly low.    Paroxysmal atrial fibrillation currently in regular rhythm. Sinus rhythm noted on EKG on admission. Anticoagulant therapy discontinued in view of active bleed. Continue metoprolol    CAD (coronary artery disease) stable angina free history of elevated cardiac enzymes on her last admission less than a year ago    Acute cystitis with hematuria and empiric antibiotic therapy with Rocephin. Urology consult has been obtained awaiting cystoscopy    Blood clot in bladder    Discuss CODE STATUS with patient with a relative at bedside. Does not want interventional measures aggressively no CPR intubation or cardioversion      Speedy Bravo MD  09/12/17  11:16 AM

## 2017-09-12 NOTE — PLAN OF CARE
Problem: Patient Care Overview (Adult)  Goal: Plan of Care Review  Outcome: Ongoing (interventions implemented as appropriate)    09/12/17 9863   Outcome Evaluation   Outcome Summary/Follow up Plan PACU DISCHARGE CRITERIA MET, TRANSFERED TO 4TH FLOOR IN STABLE CONDITION.

## 2017-09-12 NOTE — PROGRESS NOTES
Continued Stay Note   Ankit     Patient Name: Jose M Green  MRN: [id number]  Today's Date: 9/12/2017    Admit Date: 9/11/2017          Discharge Plan       09/12/17 4315    Case Management/Social Work Plan    Additional Comments Attempted to see pt, but off the floor having a procedure.  CM will f/u tomorrow.              Discharge Codes     None            Shannon Hanson

## 2017-09-12 NOTE — PLAN OF CARE
Problem: Perioperative Period (Adult)  Intervention: Promote Pulmonary Hygiene and Secretion Clearance    09/12/17 1545   Promote Aggressive Pulmonary Hygiene/Secretion Management   Cough And Deep Breathing done independently per patient   Positioning   Head Of Bed (HOB) Position HOB at 15 degrees       Intervention: Monitor/Manage Pain    09/12/17 1545   Safety Interventions   Medication Review/Management medications reviewed   Manage Acute Burn Pain   Pain Management Interventions (ensure jasso patency.)       Intervention: Promote Normothermia    09/12/17 1545   Cardiac Interventions   Warming Thermoregulation Maintenance skin exposure time minimized;warm blankets applied

## 2017-09-12 NOTE — CONSULTS
Consults    Patient Care Team:  Speedy Bravo MD as PCP - General  Speedy Bravo MD as PCP - Family Medicine  Speedy Bravo MD as PCP - Claims Attributed  Speedy Bravo MD    Chief complaint: Urinary retention    Subjective     History of Present Illness  This patient presented to the emergency department complaining of difficulty voiding along with dysuria or frequency gross hematuria abdominal pain left flank pain for the past several days.  She has long history of recurrent urinary tract infections and has had repeated bouts of antibiotics over the past several months.  She's been seen in consultation by Dr. Mixon urologist as an outpatient but few tested been performed other than the urine culture.  She has been treated with Ditropan for incontinence and was started on Estrace vaginal cream to help prevent her recurrent bladder infections.  At the time of my query the patient was somnolent and difficult to arouse somewhat confused and unable to give a clear history.  Family is not available.  The patient does have gross hematuria although she is taking Eliquis Plavix and aspirin for history of DVT and atrial fibrillation.  CT scan was performed which reveals bilateral hydronephrosis that is much worse on the right and was present to some extent in July.  She also has an abnormal bladder with bladder wall thickening and irregularity probably filling defect consistent with hemorrhagic cystitis versus bladder cancer.  Nursing personnel unable to insert a three-way Ross in the emergency room and she currently has a very small catheter draining grossly bloody urine that could be easily occluded with clots.  There were some question of an infiltrate on admission and therefore she was given a single dose of azithromycin.  Otherwise she is taking Rocephin and urine cultures are pending.  Review of Systems   General ROS: positive for  - chills and fever  Psychological ROS: negative  Ophthalmic ROS:  negative  ENT ROS: negative  Allergy and Immunology ROS: negative  Hematological and Lymphatic ROS: positive for - bleeding problems, blood clots and fatigue  Endocrine ROS: negative  Breast ROS: negative  Respiratory ROS: positive for - shortness of breath  Cardiovascular ROS: positive for - dyspnea on exertion  Gastrointestinal ROS: negative  Genito-Urinary ROS: positive for - hematuria and urinary frequency/urgency  Musculoskeletal ROS: negative  Neurological ROS: negative  Dermatological ROS: negative     Past Medical History:   Diagnosis Date   • Arthritis    • Chronic lymphocytic leukemia    • CLL (chronic lymphocytic leukemia)    • Disease of thyroid gland    • DVT (deep venous thrombosis)    • Fractures    • Gall stones    • Heart murmur    • Hypertension    • Inguinal hernia    • Kidney infection    • Stomach problems    • UTI (urinary tract infection)    ,   Past Surgical History:   Procedure Laterality Date   •  SECTION     • HYSTERECTOMY     • OTHER SURGICAL HISTORY      Both Arm Plates   • VARICOSE VEIN SURGERY     , History reviewed. No pertinent family history.,   Social History   Substance Use Topics   • Smoking status: Never Smoker   • Smokeless tobacco: Never Used   • Alcohol use No   ,   Prescriptions Prior to Admission   Medication Sig Dispense Refill Last Dose   • aspirin 81 MG EC tablet Take 1 tablet by mouth Daily. 90 tablet 3    • atorvastatin (LIPITOR) 40 MG tablet Take 1 tablet by mouth Daily. 90 tablet 3 Taking   • cefuroxime (CEFTIN) 250 MG tablet Take 1 tablet by mouth 2 (Two) Times a Day. 14 tablet 0    •  MG capsule TAKE 1 CAPSULE BY MOUTH ONE TIME A DAY  30 capsule 5 Taking   • ELIQUIS 2.5 MG tablet tablet TAKE 1 TABLET BY MOUTH TWO TIMES A DAY  60 tablet 5    • ESTRACE VAGINAL 0.1 MG/GM vaginal cream INSERT 0.1 G 3 TIMES A WEEK BY VAGINAL ROUTE AS DIRECTED  11 Taking   • latanoprost (XALATAN) 0.005 % ophthalmic solution 1 drop every night.   Taking   • levothyroxine  (SYNTHROID, LEVOTHROID) 100 MCG tablet TAKE 1 TABLET BY MOUTH ONE TIME A DAY  90 tablet 2 Taking   • losartan-hydrochlorothiazide (HYZAAR) 100-25 MG per tablet Take 1 tablet by mouth Daily.      • metoprolol tartrate (LOPRESSOR) 50 MG tablet TAKE 1 TABLET BY MOUTH TWO TIMES A DAY  60 tablet 3    • mirtazapine (REMERON) 30 MG tablet TAKE 1 TABLET BY MOUTH IN THE EVENING  30 tablet 5    • moexipril (UNIVASC) 7.5 MG tablet Take 7.5 mg by mouth Every Night.   Taking   • nitrofurantoin (MACRODANTIN) 100 MG capsule Take 100 mg by mouth 2 (Two) Times a Day.      • omeprazole (priLOSEC) 20 MG capsule TAKE 1 CAPSULE BY MOUTH ONE TIME A DAY  30 capsule 11 Taking   • oxybutynin XL (DITROPAN-XL) 10 MG 24 hr tablet Take 1 tablet by mouth Daily. 30 tablet 2 Taking   • traZODone (DESYREL) 50 MG tablet TAKE 1 TABLET BY MOUTH AT BEDTIME AS NEEDED 30 tablet 5 Taking   • clopidogrel (PLAVIX) 75 MG tablet TAKE 1 TABLET BY MOUTH ONE TIME A DAY  30 tablet 4    , Scheduled Meds:    atorvastatin 40 mg Oral Daily   [START ON 9/13/2017] ceftriaxone 1 g Intravenous Q24H   latanoprost 1 drop Both Eyes Nightly   levoFLOXacin 500 mg Intravenous Once   levothyroxine 100 mcg Oral Q AM   losartan-hydrochlorothiazide 2 tablet Oral Q24H   metoprolol tartrate 50 mg Oral Q12H   mirtazapine 30 mg Oral Nightly   moexipril 7.5 mg Oral Nightly   pantoprazole 40 mg Oral QAM   , Continuous Infusions:    sodium chloride 75 mL/hr Last Rate: 75 mL/hr (09/12/17 0549)   , PRN Meds:  •  acetaminophen  •  ipratropium-albuterol  •  Morphine **AND** naloxone  •  ondansetron  •  sodium chloride  •  traZODone and Allergies:  Codeine; Contrast dye; and Aspirin    Objective      Vital Signs  Temp:  [98.3 °F (36.8 °C)-103.1 °F (39.5 °C)] 98.3 °F (36.8 °C)  Heart Rate:  [] 115  Resp:  [18-20] 20  BP: (144-178)/(55-98) 172/55    Physical Exam     General Appearance:    Alert, cooperative, in no acute distress   Head:    Normocephalic, without obvious abnormality,  atraumatic   Eyes:            Lids and lashes normal, conjunctivae and sclerae normal, no   icterus, no pallor, corneas clear, PERRLA   Ears:    Ears appear intact with no abnormalities noted   Throat:   No oral lesions, no thrush, oral mucosa moist   Neck:   No adenopathy, supple, trachea midline, no thyromegaly, no     carotid bruit, no JVD   Back:     No kyphosis present, no scoliosis present, no skin lesions,       erythema or scars, no tenderness to percussion or                   palpation,   range of motion normal   Lungs:     Clear to auscultation,respirations regular, even and                   unlabored    Heart:    Regular rhythm and normal rate, normal S1 and S2, no            murmur, no gallop, no rub, no click   Breast Exam:    Deferred   Abdomen:     Normal bowel sounds, no masses, no organomegaly, soft        non-tender, non-distended, no guarding, no rebound                 tenderness   Genitalia:    Deferred   Extremities:   Moves all extremities well, no edema, no cyanosis, no              redness   Pulses:   Pulses palpable and equal bilaterally   Skin:   No bleeding, bruising or rash   Lymph nodes:   No palpable adenopathy   Neurologic:   Cranial nerves 2 - 12 grossly intact, sensation intact, DTR        present and equal bilaterally        Results Review:    I reviewed the patient's new clinical results.  I reviewed the patient's new imaging results and agree with the interpretation.  I reviewed the patient's other test results and agree with the interpretation        Assessment/Plan     Active Problems:    Blood clot in bladder      Assessment & Plan     most likely the patient's hemorrhages related to her multiple medications for anticoagulation therapy but she needs cystoscopy to rule out bladder tumor and possible retrograde pyelogram or cystogram to determine etiology of the hydronephrosis.  Most importantly she needs insertion of Ross catheter large enough to perform through a irrigation  since her urine grossly bloody.  Anticoagulation therapy has been discontinued and antibiotics have been started pending the results of urine culture and sensitivity.      I discussed the patients findings and my recommendations with patient and nursing staff    Obed Avalos MD  09/12/17  8:12 AM    Time: Critical care 60 min

## 2017-09-12 NOTE — ED NOTES
Unable to insert 3 way catheter. Catheter would not advance. BENJIE Briones and  advised.  notifed.      Urmila Davis RN  09/12/17 0016

## 2017-09-12 NOTE — H&P
[facility]   HISTORY AND PHYSICAL      Name:  Jose M Green   Age:  86 y.o.  Sex:  female  :  1931  MRN:  [id number]   Visit Number:  [id number]  Admission Date:  2017  Date Of Service:  17  Primary Care Physician:  Speedy Bravo MD    History Obtained From:    patient    Chief Complaint:     Hematuria     History Of Presenting Illness:      Patient is a 86-year-old  female with history of CLL, hypertension, DVT, A. fib, hypothyroidism comes in with a 3 to four-month history of UTIs.  She taken multiple antibiotics.  She took Ceftin in August, she is currently on Macrobid now.  She claims that she has mild to moderate suprapubic discomfort.  She has a fever of 100.7.  The worst pain is in the left flank.  She has no pain at present.  She has had some shortness of breath with dyspnea on exertion.  She has had urinary incontinence with hematuria.  She did see a urologist, but has not had a cystoscopy.  This appears to be Dr. Mixon.  She claims she is eating and drinking okay.  She is on Eliquis, aspirin and Plavix.  She follows with Dr. Ramirez.  Recently Plavix was stopped.  She claims she's been incontinent for 2-3 weeks.  This is severe, and there is gross blood in the urine.  She had a lot of pain tonight and was unable to urinate so was brought into the emergency room.  She does have CLL, but has not seen Dr. Newman in 3-4 months.  She had previously been on chemotherapy.  Dr. Avalos was consulted in the emergency room, recommendation was made for a three-way catheter, however this was unable to be placed.  Patient does has a regular Ross catheter at this point.  Dr. Avalos will see in the morning.      Review Of Systems:     General ROS: positive for  - chills and fever  Psychological ROS: negative  Ophthalmic ROS: negative  ENT ROS: negative  Allergy and Immunology ROS: negative  Hematological and Lymphatic ROS: positive for - bleeding problems,  blood clots and fatigue  Endocrine ROS: negative  Breast ROS: negative  Respiratory ROS: positive for - shortness of breath  Cardiovascular ROS: positive for - dyspnea on exertion  Gastrointestinal ROS: negative  Genito-Urinary ROS: positive for - hematuria and urinary frequency/urgency  Musculoskeletal ROS: negative  Neurological ROS: negative  Dermatological ROS: negative       Past Medical History:    CLL diagnosed 1-1/2 years ago.  DVT, hypertension, nephrolithiasis,  atrial fibrillation    Past Surgical history:    , total abdominal hysterectomy, left arm plate for a fracture, varicose vein surgery, cardioversion      Social History:    Nonsmoker, nondrinker, does not use drugs, lives right next door to her daughter and she states she would not want CPR, but would be intubated if necessary.    Family History:    Other day of stomach cancer, brother  of lung cancer, brother  of leukemia, brother  of an MI.  Mother  of end-stage renal disease    Allergies:      Codeine; Contrast dye; and Aspirin    Home Medications:    Prior to Admission Medications     Prescriptions Last Dose Informant Patient Reported? Taking?    aspirin 81 MG EC tablet   No Yes    Take 1 tablet by mouth Daily.    atorvastatin (LIPITOR) 40 MG tablet   No Yes    Take 1 tablet by mouth Daily.    cefuroxime (CEFTIN) 250 MG tablet   No Yes    Take 1 tablet by mouth 2 (Two) Times a Day.     MG capsule   No Yes    TAKE 1 CAPSULE BY MOUTH ONE TIME A DAY     ELIQUIS 2.5 MG tablet tablet   No Yes    TAKE 1 TABLET BY MOUTH TWO TIMES A DAY     ESTRACE VAGINAL 0.1 MG/GM vaginal cream   Yes Yes    INSERT 0.1 G 3 TIMES A WEEK BY VAGINAL ROUTE AS DIRECTED    latanoprost (XALATAN) 0.005 % ophthalmic solution   Yes Yes    1 drop every night.    levothyroxine (SYNTHROID, LEVOTHROID) 100 MCG tablet   No Yes    TAKE 1 TABLET BY MOUTH ONE TIME A DAY     losartan-hydrochlorothiazide (HYZAAR) 100-25 MG per tablet   Yes Yes    Take 1  tablet by mouth Daily.    metoprolol tartrate (LOPRESSOR) 50 MG tablet   No Yes    TAKE 1 TABLET BY MOUTH TWO TIMES A DAY     mirtazapine (REMERON) 30 MG tablet   No Yes    TAKE 1 TABLET BY MOUTH IN THE EVENING     moexipril (UNIVASC) 7.5 MG tablet   Yes Yes    Take 7.5 mg by mouth Every Night.    nitrofurantoin (MACRODANTIN) 100 MG capsule   Yes Yes    Take 100 mg by mouth 2 (Two) Times a Day.    omeprazole (priLOSEC) 20 MG capsule   No Yes    TAKE 1 CAPSULE BY MOUTH ONE TIME A DAY     oxybutynin XL (DITROPAN-XL) 10 MG 24 hr tablet   No Yes    Take 1 tablet by mouth Daily.    traZODone (DESYREL) 50 MG tablet   No Yes    TAKE 1 TABLET BY MOUTH AT BEDTIME AS NEEDED    clopidogrel (PLAVIX) 75 MG tablet   No No    TAKE 1 TABLET BY MOUTH ONE TIME A DAY              Hospital Scheduled Meds:      atorvastatin 40 mg Oral Daily   azithromycin 500 mg Intravenous Q24H   ceftriaxone 1 g Intravenous Q24H   latanoprost 1 drop Both Eyes Nightly   levothyroxine 100 mcg Oral Q AM   losartan-hydrochlorothiazide 2 tablet Oral Q24H   metoprolol tartrate 50 mg Oral Q12H   mirtazapine 30 mg Oral Nightly   moexipril 7.5 mg Oral Nightly   pantoprazole 40 mg Oral QAM         sodium chloride 75 mL/hr Last Rate: 75 mL/hr (09/12/17 0106)        Vital Signs:    Temp:  [98.7 °F (37.1 °C)-100.7 °F (38.2 °C)] 98.7 °F (37.1 °C)  Heart Rate:  [96-98] 96  Resp:  [18-20] 20  BP: (156-178)/(83-98) 156/83    Last 3 weights    09/11/17 1949   Weight: 135 lb (61.2 kg)       Body mass index is 22.47 kg/(m^2).    Physical Exam:      General Appearance:    Alert, cooperative, in no acute distress   Head:    Normocephalic, without obvious abnormality, atraumatic   Eyes:            Lids and lashes normal, conjunctivae and sclerae normal, no   icterus, no pallor, corneas clear, PERRLA   Ears:    Ears appear intact with no abnormalities noted   Throat:   No oral lesions, no thrush, oral mucosa moist   Neck:   No adenopathy, supple, trachea midline, no  thyromegaly, no     carotid bruit, no JVD   Back:     No kyphosis present, no scoliosis present, no skin lesions,       erythema or scars, no tenderness to percussion or                   palpation,   range of motion normal   Lungs:     Clear to auscultation,respirations regular, even and                   unlabored    Heart:    Regular rhythm and normal rate, normal S1 and S2, no            murmur, no gallop, no rub, no click   Breast Exam:    Deferred   Abdomen:     Normal bowel sounds, no masses, no organomegaly, soft        non-tender, non-distended, no guarding, no rebound                 tenderness   Genitalia:    Deferred   Extremities:   Moves all extremities well, no edema, no cyanosis, no              redness   Pulses:   Pulses palpable and equal bilaterally   Skin:   No bleeding, bruising or rash   Lymph nodes:   No palpable adenopathy   Neurologic:   Cranial nerves 2 - 12 grossly intact, sensation intact, DTR        present and equal bilaterally           Labs:    Results from last 7 days  Lab Units 09/11/17  2347 09/11/17  2044   LACTATE mmol/L 0.9  --    WBC 10*3/mm3  --  18.17*   HEMOGLOBIN g/dL  --  10.4*   HEMATOCRIT %  --  32.9*   MCV fL  --  85.2   MCHC g/dL  --  31.6   PLATELETS 10*3/mm3  --  165           Results from last 7 days  Lab Units 09/11/17  2044   SODIUM mmol/L 129*   POTASSIUM mmol/L 4.2   CHLORIDE mmol/L 94*   CO2 mmol/L 25.0*   BUN mg/dL 15   CREATININE mg/dL 0.80   EGFR IF NONAFRICN AM mL/min/1.73 68   CALCIUM mg/dL 9.5   GLUCOSE mg/dL 129*   ALBUMIN g/dL 4.10   BILIRUBIN mg/dL 1.1   ALK PHOS U/L 142*   AST (SGOT) U/L 42   ALT (SGPT) U/L 30   Estimated Creatinine Clearance: 48.8 mL/min (by C-G formula based on Cr of 0.8).  No results found for: AMMONIA          No results found for: HGBA1C  Lab Results   Component Value Date    TSH 5.460 (H) 07/26/2017     No results found for: PREGTESTUR, PREGSERUM, HCG, HCGQUANT  Pain Management Panel     There is no flowsheet data to display.                           Radiology:    Imaging Results (last 7 days)     Procedure Component Value Units Date/Time    CT Abdomen Pelvis Without Contrast [[id number]] Collected:  09/11/17 2138     Updated:  09/11/17 2140    Narrative:       FINAL REPORT    CLINICAL HISTORY:  BILAT FLANK PAIN, FEVER, HEMATURIA.    FINDINGS:  Multiple contiguous transaxial slices through the abdomen and pelvis were obtained without the intravenous administration of contrast with coronal reformatted images.    The bladder is enlarged with irregular wall thickening and prominent stranding of the adjacent fat. There is    High attenuation luminal content most consistent with bladder hemorrhage. This may be secondary to a hemorrhagic cystitis or a bleeding neoplasm which is not really identified. Recommend cystoscopy. There is moderate to severe right hydroureteronephrosis   and mild left hydroureteronephrosis. Findings are likely secondary to mechanical obstruction.    There is bilateral lower lobe atelectasis with moderate pleural effusions. There is a pericardial effusion. The gallbladder is dilated with a moderate stone. The liver, spleen, pancreas, adrenal glands appear normal. The aorta and iliac arteries are   calcified. The bowel gas pattern is nonobstructive. The appendix is not visualized but there are no secondary signs suggest appendicitis.    Impression:    Irregular bladder wall thickening with adjacent fat stranding in luminal hemorrhage. Recommend cystoscopy    Bilateral hydroureteronephrosis suggesting mechanical obstruction    Bilateral pleural effusions, pericardial effusion and airspace disease    Cholelithiasis      Impression:       Authenticated by John Harper MD on 09/11/2017 09:38:46 PM          Assessment:    1.  Recurrent UTI  2.  Bladder hemorrhage with blood clots   3.  Bilateral pleural effusion with possible bilateral airspace disease  4.  Pericardial effusion  5.  Bilateral hydroureteronephrosis  6.   Asymptomatic cholelithiasis  7.  CLL status post chemotherapy  8.  Hypertension  9.  History of atrial fibrillation    Plan:     We will hold Plavix, Eliquis and aspirin.  Consult Dr. Avalos.  Check echocardiogram given the pericardial effusion.  Check blood and urine cultures.  Dr. Avalos to place a 3-way catheter.  Place patient on Rocephin and Zithromax.  DuoNeb when necessary.  Check lab work in the a.m.  Place on her home medicines.  Further recommendations will depend on the clinical course.  Anticipated stay is greater than 2 midnights.  We will consult PT and OT as well.  Keep nothing by mouth for now, in case there needs to be bladder procedure in the a.m.    Smtih Ferrell DO  09/12/17  2:04 AM

## 2017-09-12 NOTE — THERAPY EVALUATION
Acute Care - Physical Therapy Initial Evaluation   Ankit     Patient Name: Jose M Green  : 1931  MRN: [id number]  Today's Date: 2017   Onset of Illness/Injury or Date of Surgery Date: 17  Date of Referral to PT: 17  Referring Physician: Carol Ferrell DO      Admit Date: 2017     Visit Dx:    ICD-10-CM ICD-9-CM   1. Blood clot in bladder N32.89 596.7   2. Hydroureteronephrosis N13.30 591   3. Urinary tract infection, site unspecified N39.0    4. Impaired mobility and ADLs Z74.09 799.89   5. Impaired functional mobility, balance, gait, and endurance Z74.09 V49.89     Patient Active Problem List   Diagnosis   • Chronic lymphocytic leukemia   • Essential hypertension   • Acquired hypothyroidism   • Right knee pain   • Paroxysmal atrial fibrillation   • CAD (coronary artery disease)   • Acute cystitis with hematuria   • Blood clot in bladder     Past Medical History:   Diagnosis Date   • Arthritis    • Chronic lymphocytic leukemia    • CLL (chronic lymphocytic leukemia)    • Disease of thyroid gland    • DVT (deep venous thrombosis)    • Fractures    • Gall stones    • Heart murmur    • Hypertension    • Inguinal hernia    • Kidney infection    • Stomach problems    • UTI (urinary tract infection)      Past Surgical History:   Procedure Laterality Date   • CARDIOVERSION     •  SECTION     • HYSTERECTOMY     • OTHER SURGICAL HISTORY      Both Arm Plates   • VARICOSE VEIN SURGERY            PT ASSESSMENT (last 72 hours)      PT Evaluation       17 1345 17 0954    Rehab Evaluation    Document Type  evaluation  -JR    Subjective Information  agree to therapy;no complaints  -JR    Patient Effort, Rehab Treatment  good  -JR    Symptoms Noted During/After Treatment  fatigue  -JR    General Information    Patient Profile Review  yes  -JR    Onset of Illness/Injury or Date of Surgery Date  17  -JR    Referring Physician  Craol Ferrell DO  -    General  Observations  Supine with jasso cath, IV in RUE dtr at bedside  -JR    Pertinent History Of Current Problem  CLL, HTN, DVT, a-fib, hypothyroidism  -JR    Precautions/Limitations  fall precautions  -JR    Prior Level of Function  independent:;all household mobility  -JR    Equipment Currently Used at Home walker, rolling  -JW walker, rolling;wheelchair;commode;shower chair;hospital bed  -JR    Plans/Goals Discussed With  patient and family;agreed upon  -JR    Risks Reviewed  patient and family:;increased discomfort  -JR    Benefits Reviewed  patient and family:;improve function;increase strength;increase balance;increase independence  -JR    Barriers to Rehab  none identified  -JR    Living Environment    Lives With  alone  -JR    Living Arrangements  apartment   it is within her daughter's home  -JR    Home Accessibility  no concerns  -JR    Clinical Impression    Date of Referral to PT  09/12/17  -JR    PT Diagnosis  weakness, poor balance, gait abnormality  -JR    Prognosis  Fair  -JR    Patient/Family Goals Statement  Ayde wants to go home, but not until she is well  -JR    Criteria for Skilled Therapeutic Interventions Met  yes;treatment indicated  -JR    Pathology/Pathophysiology Noted (Describe Specifically for Each System)  genitourinary;musculoskeletal;neuromuscular  -JR    Impairments Found (describe specific impairments)  gait, locomotion, and balance;aerobic capacity/endurance;joint integrity and mobility;posture  -JR    Rehab Potential  good, to achieve stated therapy goals  -JR    Pain Assessment    Pain Assessment  No/denies pain  -JR    Cognitive Assessment/Intervention    Current Cognitive/Communication Assessment  functional  -JR    Orientation Status  oriented x 4  -JR    Follows Commands/Answers Questions  able to follow single-step instructions;needs increased time;needs cueing  -JR    Personal Safety  mild impairment  -JR    Personal Safety Interventions  fall prevention program  maintained;gait belt  -    ROM (Range of Motion)    General ROM  no range of motion deficits identified  -    General ROM Detail  Right genu valgus  -    MMT (Manual Muscle Testing)    General MMT Assessment Detail  BLE grossly 3/5  -    Bed Mobility, Assessment/Treatment    Bed Mobility, Assistive Device  head of bed elevated;bed rails  -    Bed Mobility, Scoot/Bridge, [place]  moderate assist (50% patient effort)  -    Bed Mob, Supine to Sit, [place]  moderate assist (50% patient effort)  -    Bed Mobility, Safety Issues  impaired trunk control for bed mobility;decreased use of legs for bridging/pushing;decreased use of arms for pushing/pulling  -    Bed Mobility, Impairments  strength decreased;impaired balance;coordination impaired;postural control impaired  -    Transfer Assessment/Treatment    Transfers, Sit-Stand [place]  minimum assist (75% patient effort)  -    Transfers, Stand-Sit [place]  minimum assist (75% patient effort)  -    Transfers, Sit-Stand-Sit, Assist Device  rolling walker  -    Transfer, Safety Issues  balance decreased during turns;sequencing ability decreased;step length decreased  -    Transfer, Impairments  postural control impaired;motor control impaired;impaired balance;strength decreased  -    Gait Assessment/Treatment    Gait, [place] Level  minimum assist (75% patient effort)  -    Gait, Assistive Device  rolling walker  -    Gait, Distance (Feet)  40  -    Gait, Gait Deviations  toe-to-floor clearance decreased;swing-to-stance ratio decreased;juan carlos decreased;forward flexed posture  -    Gait, Safety Issues  balance decreased during turns;sequencing ability decreased;step length decreased  -    Gait, Impairments  coordination impaired;impaired balance;strength decreased  -    Positioning and Restraints    Pre-Treatment Position  in bed  -JR    Post Treatment Position  chair  -    In Chair  sitting;call light within  reach;encouraged to call for assist;with family/caregiver  -JR      09/12/17 0951 09/12/17 0155    Rehab Evaluation    Document Type evaluation  -     Subjective Information agree to therapy;no complaints  -     Patient Effort, Rehab Treatment adequate  -     Symptoms Noted During/After Treatment fatigue  -     General Information    Patient Profile Review yes  -     Onset of Illness/Injury or Date of Surgery Date 09/11/17  -     Referring Physician Dr. Ferrell  -     General Observations Pt received supine in bed   -     Pertinent History Of Current Problem h/o CLL, HTN, DVT, Afib, hypothyroidism  -     Precautions/Limitations fall precautions  -     Prior Level of Function min assist:;bathing;independent:;dressing;all household mobility  -     Equipment Currently Used at Home walker, rolling;wheelchair;commode;shower chair;hospital bed  - walker, rolling  -    Plans/Goals Discussed With patient and family;agreed upon  -     Risks Reviewed patient and family:;increased discomfort  -     Benefits Reviewed patient and family:;improve function;increase independence;increase strength  -     Living Environment    Lives With alone  - alone  -    Living Arrangements apartment   Pt lives in an apartment in her daughter's home  - apartment   connected to daughter's home  -    Home Accessibility no concerns  - no concerns  -    Stair Railings at Home  none  -    Type of Financial/Environmental Concern  none  -    Transportation Available  family or friend will provide  -    Living Environment Comment  none  -    Pain Assessment    Pain Assessment No/denies pain  -     Vision Assessment/Intervention    Visual Impairment WFL  -     Cognitive Assessment/Intervention    Current Cognitive/Communication Assessment functional  -     Orientation Status oriented x 4  -     Follows Commands/Answers Questions able to follow single-step instructions  -     Personal Safety  mild impairment  -     Personal Safety Interventions fall prevention program maintained;gait belt;nonskid shoes/slippers when out of bed  -     ROM (Range of Motion)    General ROM no range of motion deficits identified  -     MMT (Manual Muscle Testing)    General MMT Assessment upper extremity strength deficits identified   BUEs grossly 4/5  -     Bed Mobility, Assessment/Treatment    Bed Mob, Supine to Sit, [place] moderate assist (50% patient effort)  -     Transfer Assessment/Treatment    Transfers, Sit-Stand [place] minimum assist (75% patient effort)  -     Transfers, Stand-Sit [place] minimum assist (75% patient effort)  -     Transfers, Sit-Stand-Sit, Assist Device rolling walker  -     Positioning and Restraints    Pre-Treatment Position in bed  -     Post Treatment Position chair  -     In Chair sitting;call light within reach;encouraged to call for assist;with family/caregiver  -       User Key  (r) = Recorded By, (t) = Taken By, (c) = Cosigned By    Initials Name Provider Type     Renita Mcmanus Occupational Therapist    JR Jolene Garcia, KEILA Physical Therapist    WILBER Tesfaye RN Registered Nurse    STEVE Livingston RN Registered Nurse          Physical Therapy Education     Title: PT OT SLP Therapies (Active)     Topic: Physical Therapy (Active)     Point: Mobility training (Done)    Learning Progress Summary    Learner Readiness Method Response Comment Documented by Status   Patient Acceptance E VU importance of mobility to recovery  09/12/17 1717 Done                      User Key     Initials Effective Dates Name Provider Type [facility] 10/26/16 -  Jolene Garcia PT Physical Therapist PT                PT Recommendation and Plan  Anticipated Discharge Disposition: home with home health, home with assist  Planned Therapy Interventions: strengthening, balance training, bed mobility training, transfer training, gait training, patient/family  education  PT Frequency: daily  Plan of Care Review  Plan Of Care Reviewed With: patient  Outcome Summary/Follow up Plan: Patient participates well in skilled PT and demonstrates deficits in strength, transfers, balance and gait.  She is expected to make progress with additional PT services while hospitalized and upon discharge to home with home [facility] services.                                 IP PT Goals       09/12/17 1720          Bed Mobility PT LTG    Bed Mobility PT LTG, Date Established 09/12/17  -JR      Bed Mobility PT LTG, Time to Achieve 2 wks  -JR      Bed Mobility PT LTG, Activity Type all bed mobility  -JR      Bed Mobility PT LTG, [place] Level supervision required  -JR      Transfer Training PT LTG    Transfer Training PT LTG, Date Established 09/12/17  -JR      Transfer Training PT LTG, Time to Achieve 2 wks  -JR      Transfer Training PT LTG, Activity Type sit to stand/stand to sit;bed to chair /chair to bed  -JR      Transfer Training PT LTG, [place] Level supervision required  -JR      Transfer Training PT LTG, Assist Device walker, rolling  -JR      Gait Training PT LTG    Gait Training Goal PT LTG, Date Established 09/12/17  -JR      Gait Training Goal PT LTG, Time to Achieve 2 wks  -JR      Gait Training Goal PT LTG, [place] Level contact guard assist  -JR      Gait Training Goal PT LTG, Assist Device walker, rolling  -JR      Gait Training Goal PT LTG, Distance to Achieve 250  -JR        User Key  (r) = Recorded By, (t) = Taken By, (c) = Cosigned By    Initials Name Provider Type    JR Jolene Garcia, PT Physical Therapist                Outcome Measures       09/12/17 0954 09/12/17 0951       How much help from another person do you currently need...    Turning from your back to your side while in flat bed without using bedrails? 3  -JR      Moving from lying on back to sitting on the side of a flat bed without bedrails? 3  -JR      Moving to and from a bed to a chair  (including a wheelchair)? 3  -JR      Standing up from a chair using your arms (e.g., wheelchair, bedside chair)? 3  -JR      Climbing 3-5 steps with a railing? 4  -JR      To walk in hospital room? 3  -JR      AM-PAC 6 Clicks Score 19  -JR      How much help from another is currently needed...    Putting on and taking off regular lower body clothing?  3  -AH     Bathing (including washing, rinsing, and drying)  3  -AH     Toileting (which includes using toilet bed pan or urinal)  3  -AH     Putting on and taking off regular upper body clothing  3  -AH     Taking care of personal grooming (such as brushing teeth)  3  -AH     Eating meals  4  -     Score  19  -     Functional Assessment    Outcome Measure Options AM-PAC 6 Clicks Basic Mobility (PT)  - AM-PAC 6 Clicks Daily Activity (OT)  -       User Key  (r) = Recorded By, (t) = Taken By, (c) = Cosigned By    Initials Name Provider Type     Renita Mcmanus Occupational Therapist    JR Jolene Garcia, PT Physical Therapist           Time Calculation:         PT Charges       09/12/17 1718          Time Calculation    Start Time 0954  -      PT Received On 09/12/17  -      PT Goal Re-Cert Due Date 09/22/17  -        User Key  (r) = Recorded By, (t) = Taken By, (c) = Cosigned By    Initials Name Provider Type    JR Jolene Garcia PT Physical Therapist          Therapy Charges for Today     Code Description Service Date Service Provider Modifiers Qty    [id number] HC PT EVAL MOD COMPLEXITY 4 9/12/2017 Jolene Garcia, PT GP 1          PT G-Codes  Outcome Measure Options: AM-PAC 6 Clicks Basic Mobility (PT)      Jolene Garcia, PT  9/12/2017

## 2017-09-12 NOTE — PLAN OF CARE
Problem: Patient Care Overview (Adult)  Goal: Plan of Care Review  Outcome: Ongoing (interventions implemented as appropriate)    09/12/17 0510   Coping/Psychosocial Response Interventions   Plan Of Care Reviewed With patient   Patient Care Overview   Progress improving         Problem: Urine Elimination, Impaired (Adult)  Goal: Identify Related Risk Factors and Signs and Symptoms  Outcome: Outcome(s) achieved Date Met:  09/12/17 09/12/17 0510   Urine Elimination, Impaired   Urine Elimination, Impaired: Related Risk Factors disease process;immobility;traumatic injury       Goal: Effective Urinary Elimination  Outcome: Ongoing (interventions implemented as appropriate)    09/12/17 0510   Urine Elimination, Impaired (Adult)   Effective Urinary Elimination making progress toward outcome       Goal: Effective Containment of Urine  Outcome: Outcome(s) achieved Date Met:  09/12/17 09/12/17 0510   Urine Elimination, Impaired (Adult)   Effective Containment of Urine achieves outcome       Goal: Reduced Incontinence Episodes  Outcome: Outcome(s) achieved Date Met:  09/12/17 09/12/17 0510   Urine Elimination, Impaired (Adult)   Reduced Incontinence Episodes achieves outcome

## 2017-09-12 NOTE — ANESTHESIA PREPROCEDURE EVALUATION
Anesthesia Evaluation     Patient summary reviewed and Nursing notes reviewed   no history of anesthetic complications:  NPO Solid Status: > 8 hours  NPO Liquid Status: > 8 hours     Airway   Mallampati: I  TM distance: >3 FB  Neck ROM: full  no difficulty expected  Dental    (+) upper dentures and edentulous    Pulmonary - normal exam   (+) shortness of breath, decreased breath sounds,   (-) not a smoker    ROS comment: Pulmonary htn  Cardiovascular - normal exam  Exercise tolerance: poor (<4 METS)    ECG reviewed  PT is on anticoagulation therapy  Patient on routine beta blocker and Beta blocker given within 24 hours of surgery    (+) hypertension, valvular problems/murmurs, CAD, dysrhythmias, CHF, URIBE, PVD, DVT,       Neuro/Psych  GI/Hepatic/Renal/Endo    (+)  GERD well controlled, hypothyroidism,     Musculoskeletal     (+) arthralgias, back pain, chronic pain, gait problem,   Abdominal  - normal exam   Substance History      OB/GYN          Other   (+) blood dyscrasia, arthritis   history of cancer    ROS/Med Hx Other: Labs reviewed h/h 8/27 plt 103  k 3.5 Na 130(chronic hyponatremia)  Ct scan lungs lower lobe atelectasis with moderate pleural effusions       old cxr pulmonary venous hypertension.    ekg afib  Echo today   Mild LVH with normal LV systolic function ( EF is over 55%)  2) Severe left atrial enlargement with mild elevation in LVedp  3) Mild to moderate MR   4) Moderate TR with a PAsp of 77 mm of hg with normal PVR   5) Mild dilation of the RV with normal RV systolic function                               Anesthesia Plan    ASA 4     general and spinal   (Risks and benefits discussed including risk of aspiration, recall and dental damage. All patient questions answered. Will continue with POC.  Discussed risk with pt., pt extremely high intraop and postop risk for cv, resp, and neuro events.)  intravenous induction   Anesthetic plan and risks discussed with patient and child.

## 2017-09-12 NOTE — OP NOTE
CYSTOSCOPY CYSTOGRAM, CYSTOSCOPY RETROGRADE PYELOGRAM, CYSTOSCOPY BLADDER BIOPSY, CYSTOSCOPY TRANSURETHRAL RESECTION OF BLADDER TUMOR  Procedure Note    Jose M Green  9/11/2017 - 9/12/2017    Pre-op Diagnosis:   Blood clot in bladder [N32.89]    Post-op Diagnosis:     Post-Op Diagnosis Codes:     * Blood clot in bladder [N32.89]    Procedure/CPT® Codes:      Procedure(s):  CYSTOSCOPY CYSTOGRAM  CYSTOSCOPY RETROGRADE PYELOGRAM  CYSTOSCOPY BLADDER BIOPSY  CYSTOSCOPY TRANSURETHRAL RESECTION OF BLADDER TUMOR    Surgeon(s):  Obed Avalos MD    Anesthesia: General  Operative technique: This patient with gross hematuria was brought to the cystoscopy room and given a general anesthetic.  Her Ross catheter had essentially clotted off and was removed.  The panendoscope was inserted into the bladder and was irrigated copiously with numerous clots extracted.  Visualization was poor because of the bloody reflux.  The floor the bladder was best seen with some normal-appearing mucosa but the majority of the dome of the bladder was hemorrhagic and covered with blood clot.  Ureteral orifices were not seen.  Contrast was instilled in the bladder and films exposed revealing an irregular bladder outline with reflux of the right ureter into the right renal pelvis but with minimal obstruction as the dye extruded from the orifice with no residual in the ureter.  An attempt was made to perform a retrograde pyelogram on the left side but ureteral orifice was never seen for sure despite probing with a Pollack catheter.  There appeared to be a bladder tumor on the left side of the bladder was visualized and biopsy was obtained and submitted for pathological examination.  A three-way Ross catheter was then inserted and the patient was taken to the recovery  Staff:   Circulator: Juana Cui RN; Carla Pearce RN  Scrub Person: Che Chavarria    Estimated Blood Loss: *No blood loss documented*    Specimens:                  ID  Type Source Tests Collected by Time Destination   A : urinary bladder tumor bxs Tissue Urinary Bladder TISSUE EXAM Carla Pearce RN 9/12/2017 1511          Drains:   Urethral Catheter 09/12/17 0010 100% silicone 16 10 10 (Active)   Daily Indications Urologist on the case and cannot remove without order 9/12/2017  1:53 AM   Securement secured to upper leg with adhesive device 9/12/2017  1:53 AM   Catheter care done Yes 9/12/2017  1:53 AM   Tolerance no signs/symptoms of discomfort 9/12/2017  4:00 AM   Urine Output (mL) 300 9/12/2017  1:08 AM           Findings: Active bleeding in the bladder with clots poor visualization    Complications: None      Obed Avalos MD     Date: 9/12/2017  Time: 3:20 PM

## 2017-09-12 NOTE — THERAPY EVALUATION
Acute Care - Occupational Therapy Initial Evaluation  [facility]     Patient Name: Jose M Green  : 1931  MRN: [id number]  Today's Date: 2017  Onset of Illness/Injury or Date of Surgery Date: 17  Date of Referral to OT: 17  Referring Physician: Dr. Ferrell    Admit Date: 2017       ICD-10-CM ICD-9-CM   1. Blood clot in bladder N32.89 596.7   2. Hydroureteronephrosis N13.30 591   3. Urinary tract infection, site unspecified N39.0    4. Impaired mobility and ADLs Z74.09 799.89     Patient Active Problem List   Diagnosis   • Chronic lymphocytic leukemia   • Essential hypertension   • Acquired hypothyroidism   • Right knee pain   • Paroxysmal atrial fibrillation   • CAD (coronary artery disease)   • Acute cystitis with hematuria   • Blood clot in bladder     Past Medical History:   Diagnosis Date   • Arthritis    • Chronic lymphocytic leukemia    • CLL (chronic lymphocytic leukemia)    • Disease of thyroid gland    • DVT (deep venous thrombosis)    • Fractures    • Gall stones    • Heart murmur    • Hypertension    • Inguinal hernia    • Kidney infection    • Stomach problems    • UTI (urinary tract infection)      Past Surgical History:   Procedure Laterality Date   • CARDIOVERSION     •  SECTION     • HYSTERECTOMY     • OTHER SURGICAL HISTORY      Both Arm Plates   • VARICOSE VEIN SURGERY            OT ASSESSMENT FLOWSHEET (last 72 hours)      OT Evaluation       17 1345 17 0951 17 0155          Rehab Evaluation    Document Type  evaluation  -       Subjective Information  agree to therapy;no complaints  -       Patient Effort, Rehab Treatment  adequate  -       Symptoms Noted During/After Treatment  fatigue  -       General Information    Patient Profile Review  yes  -AH       Onset of Illness/Injury or Date of Surgery Date  17  -       Referring Physician  Dr. Ferrell  -       General Observations  Pt received supine in bed   -        Pertinent History Of Current Problem  h/o CLL, HTN, DVT, Afib, hypothyroidism  -       Precautions/Limitations  fall precautions  -       Prior Level of Function  min assist:;bathing;independent:;dressing;all household mobility  -       Equipment Currently Used at Home walker, rolling  - walker, rolling;wheelchair;commode;shower chair;hospital bed  - walker, rolling  -      Plans/Goals Discussed With  patient and family;agreed upon  -       Risks Reviewed  patient and family:;increased discomfort  -       Benefits Reviewed  patient and family:;improve function;increase independence;increase strength  -       Living Environment    Lives With  alone  - alone  -      Living Arrangements  apartment   Pt lives in an apartment in her daughter's home  - apartment   connected to daughter's home  -      Home Accessibility  no concerns  - no concerns  -      Stair Railings at Home   none  -      Type of Financial/Environmental Concern   none  -      Transportation Available   family or friend will provide  -      Living Environment Comment   none  -      Clinical Impression    Date of Referral to OT  09/12/17  -       OT Diagnosis  ADL decline  -       Patient/Family Goals Statement  Pt wants to wants to return home with home health  -       Criteria for Skilled Therapeutic Interventions Met  yes;treatment indicated  -       Rehab Potential  good, to achieve stated therapy goals  -       Therapy Frequency  3-5 times/wk  -       Anticipated Discharge Disposition  home with home health  -       Functional Level Prior    Ambulation  1-->assistive equipment  - 1-->assistive equipment  -      Transferring  1-->assistive equipment  - 1-->assistive equipment  -RC      Toileting  0-->independent  - 1-->assistive equipment  -RC      Bathing  2-->assistive person  - 1-->assistive equipment  -RC      Dressing  0-->independent  - 0-->independent  -RC      Eating   0-->independent  - 0-->independent  -      Communication  0-->understands/communicates without difficulty  - 0-->understands/communicates without difficulty  -      Swallowing  0-->swallows foods/liquids without difficulty  - 0-->swallows foods/liquids without difficulty  -      Prior Functional Level Comment   none  -      Pain Assessment    Pain Assessment  No/denies pain  -       Vision Assessment/Intervention    Visual Impairment  WFL  -       Cognitive Assessment/Intervention    Current Cognitive/Communication Assessment  functional  -       Orientation Status  oriented x 4  -       Follows Commands/Answers Questions  able to follow single-step instructions  -       Personal Safety  mild impairment  -       Personal Safety Interventions  fall prevention program maintained;gait belt;nonskid shoes/slippers when out of bed  -       ROM (Range of Motion)    General ROM  no range of motion deficits identified  -       MMT (Manual Muscle Testing)    General MMT Assessment  upper extremity strength deficits identified   BUEs grossly 4/5  -       Bed Mobility, Assessment/Treatment    Bed Mob, Supine to Sit, [place]  moderate assist (50% patient effort)  -       Transfer Assessment/Treatment    Transfers, Sit-Stand [place]  minimum assist (75% patient effort)  -       Transfers, Stand-Sit [place]  minimum assist (75% patient effort)  -       Transfers, Sit-Stand-Sit, Assist Device  rolling walker  -       Functional Mobility    Functional Mobility- Ind. Level  minimum assist (75% patient effort)  -       Functional Mobility- Device  rolling walker  -       Functional Mobility-Distance (Feet)  40  -       Upper Body Bathing Assessment/Training    UB Bathing Assess/Train, [place] Level  minimum assist (75% patient effort)  -       Lower Body Bathing Assessment/Training    LB Bathing Assess/Train, [place] Level  minimum assist (75% patient effort)   -       Upper Body Dressing Assessment/Training    UB Dressing Assess/Train, [place]  contact guard assist  -       Lower Body Dressing Assessment/Training    LB Dressing Assess/Train, [place]  minimum assist (75% patient effort)  -       Toileting Assessment/Training    Toileting Assess/Train, [place] Level  minimum assist (75% patient effort)  -       Grooming Assessment/Training    Grooming Assess/Train, [place]ep[place] Level  minimum assist (75% patient effort)  -       General Therapy Interventions    Planned Therapy Interventions  adaptive equipment training;ADL retraining;bed mobility training;strengthening;transfer training  -       Positioning and Restraints    Pre-Treatment Position  in bed  -       Post Treatment Position  chair  -       In Chair  sitting;call light within reach;encouraged to call for assist;with family/caregiver  -         User Key  (r) = Recorded By, (t) = Taken By, (c) = Cosigned By    Initials Name Effective Dates     Renita Mcmanus 10/26/16 -     WILBER Tesfaye RN 11/07/16 -     STEVE Livingston RN 04/07/17 -            Occupational Therapy Education     Title: PT OT SLP Therapies (Active)     Topic: Occupational Therapy (Active)     Point: ADL training (Done)    Description: Instruct learner(s) on proper safety adaptation and remediation techniques during self care or transfers.   Instruct in proper use of assistive devices.    Learning Progress Summary    Learner Readiness Method Response Comment Documented by Status   Patient Acceptance E VU Role of OT/POC  09/12/17 1511 Done   Family Acceptance E VU Role of OT/POC  09/12/17 1511 Done                      User Key     Initials Effective Dates Name Provider Type Discipline     10/26/16 -  Renita Mcmanus Occupational Therapist OT                  OT Recommendation and Plan  Anticipated Discharge Disposition: home with home health  Planned Therapy Interventions: adaptive equipment training, ADL  retraining, bed mobility training, strengthening, transfer training  Therapy Frequency: 3-5 times/wk  Plan of Care Review  Plan Of Care Reviewed With: patient, daughter  Progress: no change  Outcome Summary/Follow up Plan: Pt seen for OT evaluation today.  Pt presents with decline in ADL independence and is expected to benefit from skilled OT to improve her independence with self care, transfers, and functional mobility tasks.          OT Goals       09/12/17 1512          Bed Mobility OT LTG    Bed Mobility OT LTG, Date Established 09/12/17  -      Bed Mobility OT LTG, Time to Achieve by discharge  -      Bed Mobility OT LTG, Activity Type supine to sit/sit to supine  -      Bed Mobility OT LTG, [place] Level contact guard assist  -      Bed Mobility OT LTG, Assist Device bed rails  -      Bed Mobility OT LTG, Outcome goal ongoing  -      Strength OT LTG    Strength Goal OT LTG, Date Established 09/12/17  -      Strength Goal OT LTG, Time to Achieve by discharge  -      Strength Goal OT LTG, Functional Goal Pt will participate in BUE strengthening ex to improve her independence with ADL tasks.  -      Strength Goal OT LTG, Outcome goal ongoing  -      LB Dressing OT LTG    LB Dressing Goal OT LTG, Date Established 09/12/17  -      LB Dressing Goal OT LTG, Time to Achieve by discharge  -      LB Dressing Goal OT LTG, [place] Level contact guard assist  -      LB Dressing Goal OT LTG, Outcome goal ongoing  -      Functional Mobility OT LTG    Functional Mobility Goal OT LTG, Date Established 09/12/17  -      Functional Mobility Goal OT LTG, Time to Achieve by discharge  -      Functional Mobility Goal OT LTG, [place] Level contact guard  -      Functional Mobility Goal OT LTG, Assist Device rolling walker  -      Functional Mobility Goal OT LTG, Distance to Achieve in hallway  -      Functional Mobility Goal OT LTG, Outcome goal ongoing  -        User Key  (r) =  Recorded By, (t) = Taken By, (c) = Cosigned By    Initials Name Provider Type     Renita Mcmanus Occupational Therapist                Outcome Measures       09/12/17 0951          How much help from another is currently needed...    Putting on and taking off regular lower body clothing? 3  -AH      Bathing (including washing, rinsing, and drying) 3  -AH      Toileting (which includes using toilet bed pan or urinal) 3  -AH      Putting on and taking off regular upper body clothing 3  -AH      Taking care of personal grooming (such as brushing teeth) 3  -AH      Eating meals 4  -      Score 19  -      Functional Assessment    Outcome Measure Options AM-PAC 6 Clicks Daily Activity (OT)  -        User Key  (r) = Recorded By, (t) = Taken By, (c) = Cosigned By    Initials Name Provider Type     Renita Mcmanus Occupational Therapist          Time Calculation:   OT Start Time: 0951    Therapy Charges for Today     Code Description Service Date Service Provider Modifiers Qty    73121580799 HC OT EVAL LOW COMPLEXITY 4 9/12/2017 Renita Mcmanus GO 1               Renita Mcmanus  9/12/2017

## 2017-09-12 NOTE — NURSING NOTE
This RN asks daughter at bedside about siblings. She states she has one sister, who patient lives with, and four brothers. This RN states she was under the impression that she only had a sister.  She said her brothers were very hard to get in touch with and she will have difficulty.  She said nurse on floor told her the eldest sibling can sign and give consent.  This RN informs her of policy to have majority of siblings consent if reasonable. She said that most of her siblings are disabled and very hard to reach.     Ebony Zarate informed of situation.  Attempts made to reach two of the five needed. No answer and no voicemail available. When asked for other siblings numbers she said she has none. She said one is estranged and one is homeless. Ebony informed.  Attempts made to inform Cherelle Byrne-no answer.    Dr. Avalos at bedside and very upset.  Wants patient in OR. States this is an emergency. Dr. Avalos signed consent.     0227-Ebony Zarate states she spoke with Cherelle Byrne. Consent is acceptable for surgery.

## 2017-09-12 NOTE — PLAN OF CARE
Problem: Patient Care Overview (Adult)  Goal: Plan of Care Review  Outcome: Ongoing (interventions implemented as appropriate)    09/12/17 1720   Coping/Psychosocial Response Interventions   Plan Of Care Reviewed With patient   Outcome Evaluation   Outcome Summary/Follow up Plan Patient participates well in skilled PT and demonstrates deficits in strength, transfers, balance and gait. She is expected to make progress with additional PT services while hospitalized and upon discharge to home with home [facility] services.          Problem: Inpatient Physical Therapy  Goal: Bed Mobility Goal LTG- PT  Outcome: Ongoing (interventions implemented as appropriate)    09/12/17 1720   Bed Mobility PT LTG   Bed Mobility PT LTG, Date Established 09/12/17   Bed Mobility PT LTG, Time to Achieve 2 wks   Bed Mobility PT LTG, Activity Type all bed mobility   Bed Mobility PT LTG, [place] Level supervision required       Goal: Transfer Training Goal 1 LTG- PT  Outcome: Ongoing (interventions implemented as appropriate)    09/12/17 1720   Transfer Training PT LTG   Transfer Training PT LTG, Date Established 09/12/17   Transfer Training PT LTG, Time to Achieve 2 wks   Transfer Training PT LTG, Activity Type sit to stand/stand to sit;bed to chair /chair to bed   Transfer Training PT LTG, [place] Level supervision required   Transfer Training PT LTG, Assist Device walker, rolling       Goal: Gait Training Goal LTG- PT  Outcome: Ongoing (interventions implemented as appropriate)    09/12/17 1720   Gait Training PT LTG   Gait Training Goal PT LTG, Date Established 09/12/17   Gait Training Goal PT LTG, Time to Achieve 2 wks   Gait Training Goal PT LTG, [place] Level contact guard assist   Gait Training Goal PT LTG, Assist Device walker, rolling   Gait Training Goal PT LTG, Distance to Achieve 250

## 2017-09-12 NOTE — ED PROVIDER NOTES
Subjective   HPI Comments: 86-year-old female with a 3-4 month history of recurrent urinary tract infections.  Apparently finished a course of Ceftin in August and is currently on Macrobid for urinary tract infection.  She is here complaining of a few days of dysuria, frequency, hematuria, mild to moderate suprapubic abdominal discomfort radiating to her left flank region that is mild to moderate in severity.  Also has a low-grade fever to 100.7 upon arrival here.  Some nausea without vomiting or diarrhea.  The patient is on Plavix and eliquis for blood thinners.    Aggravating factors: Urinating.  Alleviating factors: None.  Treatment prior to arrival: Ceftin and Macrobid.      History provided by:  Patient  History limited by: Nothing.   used: No        Review of Systems   Constitutional: Positive for chills and fever.   HENT: Negative.    Eyes: Negative.    Respiratory: Negative.    Cardiovascular: Negative.  Negative for chest pain.   Gastrointestinal: Positive for abdominal pain and nausea.   Endocrine: Negative.    Genitourinary: Positive for dysuria, flank pain and frequency.   Skin: Negative.    Allergic/Immunologic: Negative.    Neurological: Negative.    Hematological: Negative.    Psychiatric/Behavioral: Negative.    All other systems reviewed and are negative.      Past Medical History:   Diagnosis Date   • Arthritis    • Chronic lymphocytic leukemia    • CLL (chronic lymphocytic leukemia)    • Disease of thyroid gland    • DVT (deep venous thrombosis)    • Fractures    • Gall stones    • Heart murmur    • Hypertension    • Inguinal hernia    • Kidney infection    • Stomach problems    • UTI (urinary tract infection)        Allergies   Allergen Reactions   • Codeine Nausea And Vomiting   • Contrast Dye Hives   • Aspirin Palpitations       Past Surgical History:   Procedure Laterality Date   •  SECTION     • HYSTERECTOMY     • OTHER SURGICAL HISTORY      Both Arm Plates   •  VARICOSE VEIN SURGERY         History reviewed. No pertinent family history.    Social History     Social History   • Marital status:      Spouse name: N/A   • Number of children: N/A   • Years of education: N/A     Social History Main Topics   • Smoking status: Never Smoker   • Smokeless tobacco: Never Used   • Alcohol use No   • Drug use: No   • Sexual activity: Not Asked     Other Topics Concern   • None     Social History Narrative           Objective   Physical Exam   Constitutional: She is oriented to person, place, and time. She appears well-developed and well-nourished. No distress.   HENT:   Head: Normocephalic and atraumatic.   Right Ear: External ear normal.   Left Ear: External ear normal.   Eyes: EOM are normal. Pupils are equal, round, and reactive to light.   Neck: Normal range of motion. Neck supple.   Cardiovascular: Normal rate, regular rhythm and normal heart sounds.    Pulmonary/Chest: Effort normal and breath sounds normal. No stridor. She has no wheezes. She exhibits no tenderness.   Abdominal: Soft. She exhibits no distension. There is tenderness (suprapubic area is tender to palpation.  Left CVA tenderness noted.). There is no rebound and no guarding.   Musculoskeletal: Normal range of motion. She exhibits no edema.   Neurological: She is alert and oriented to person, place, and time.   Skin: Skin is warm and dry. No rash noted. She is not diaphoretic.   Psychiatric: She has a normal mood and affect. Her behavior is normal. Judgment and thought content normal.   Nursing note and vitals reviewed.      ECG 12 Lead    Date/Time: 9/12/2017 12:36 AM  Performed by: LORAINE ANN  Authorized by: LORAINE ANN   Comments: EKG: Sinus rhythm at rate of 96.  Few PACs.  No ischemia or infarction.  Normal axis.  Normal intervals.               ED Course  ED Course   Comment By Time   I spoke with Dr. Avalos: hold blood thinners.  Do an EKG to see if the patient is in atrial fibrillation.   Three-way Ross catheter, Rocephin, urine culture, blood cultures.  Needs a cystoscopy.  Will consult. Adrianne Hoyos PA-C 09/11 2320   Hinsberg:  Give the patient Zithromax for lung infiltrates.  Normal saline at 75 cc per hour ×1 L. Adrianne Hoyos PA-C 09/11 2340   Nurse's unable to pass 3 way catheter.  I spoke to Dr. Avalos: Try a non three-way catheter.  If we cannot get it, he will do it in the morning. Adrianne Hoyos PA-C 09/12 0006                  MDM  Number of Diagnoses or Management Options  Blood clot in bladder: new and requires workup  Hydroureteronephrosis: new and requires workup  Urinary tract infection, site unspecified: new and requires workup     Amount and/or Complexity of Data Reviewed  Clinical lab tests: reviewed and ordered  Tests in the radiology section of CPT®: ordered and reviewed  Decide to obtain previous medical records or to obtain history from someone other than the patient: yes  Discuss the patient with other providers: yes    Risk of Complications, Morbidity, and/or Mortality  Presenting problems: moderate  Diagnostic procedures: low  Management options: moderate    Patient Progress  Patient progress: stable      Final diagnoses:   Blood clot in bladder   Hydroureteronephrosis   Urinary tract infection, site unspecified            Adrianne Hoyos PA-C  09/11/17 2344       Adrianne Hyoos PA-C  09/12/17 0037

## 2017-09-12 NOTE — PLAN OF CARE
Problem: Patient Care Overview (Adult)  Goal: Plan of Care Review  Outcome: Ongoing (interventions implemented as appropriate)    09/12/17 1512   Coping/Psychosocial Response Interventions   Plan Of Care Reviewed With patient;daughter   Patient Care Overview   Progress no change   Outcome Evaluation   Outcome Summary/Follow up Plan Pt seen for OT evaluation today. Pt presents with decline in ADL independence and is expected to benefit from skilled OT to improve her independence with self care, transfers, and functional mobility tasks.         Problem: Inpatient Occupational Therapy  Goal: Bed Mobility Goal LTG- OT  Outcome: Ongoing (interventions implemented as appropriate)    09/12/17 1512   Bed Mobility OT LTG   Bed Mobility OT LTG, Date Established 09/12/17   Bed Mobility OT LTG, Time to Achieve by discharge   Bed Mobility OT LTG, Activity Type supine to sit/sit to supine   Bed Mobility OT LTG, [place] Level contact guard assist   Bed Mobility OT LTG, Assist Device bed rails   Bed Mobility OT LTG, Outcome goal ongoing       Goal: Strength Goal LTG- OT  Outcome: Ongoing (interventions implemented as appropriate)    09/12/17 1512   Strength OT LTG   Strength Goal OT LTG, Date Established 09/12/17   Strength Goal OT LTG, Time to Achieve by discharge   Strength Goal OT LTG, Functional Goal Pt will participate in BUE strengthening ex to improve her independence with ADL tasks.   Strength Goal OT LTG, Outcome goal ongoing       Goal: LB Dressing Goal LTG- OT  Outcome: Ongoing (interventions implemented as appropriate)    09/12/17 1512   LB Dressing OT LTG   LB Dressing Goal OT LTG, Date Established 09/12/17   LB Dressing Goal OT LTG, Time to Achieve by discharge   LB Dressing Goal OT LTG, [place] Level contact guard assist   LB Dressing Goal OT LTG, Outcome goal ongoing       Goal: Functional Mobility Goal LTG- OT  Outcome: Ongoing (interventions implemented as appropriate)    09/12/17 1512   Functional  Mobility OT LTG   Functional Mobility Goal OT LTG, Date Established 09/12/17   Functional Mobility Goal OT LTG, Time to Achieve by discharge   Functional Mobility Goal OT LTG, [place] Level contact guard   Functional Mobility Goal OT LTG, Assist Device rolling walker   Functional Mobility Goal OT LTG, Distance to Achieve in hallway   Functional Mobility Goal OT LTG, Outcome goal ongoing

## 2017-09-12 NOTE — PLAN OF CARE
Problem: Perioperative Period (Adult)  Goal: Signs and Symptoms of Listed Potential Problems Will be Absent or Manageable (Perioperative Period)  Outcome: Ongoing (interventions implemented as appropriate)    09/12/17 9870   Perioperative Period   Problems Assessed (Perioperative Period) all   Problems Present (Perioperative Period) situational response

## 2017-09-13 ENCOUNTER — APPOINTMENT (OUTPATIENT)
Dept: GENERAL RADIOLOGY | Facility: HOSPITAL | Age: 82
End: 2017-09-13
Attending: UROLOGY

## 2017-09-13 ENCOUNTER — EPISODE CHANGES (OUTPATIENT)
Dept: CASE MANAGEMENT | Facility: OTHER | Age: 82
End: 2017-09-13

## 2017-09-13 LAB
ABO GROUP BLD: NORMAL
ABO GROUP BLD: NORMAL
ALBUMIN SERPL-MCNC: 2.7 G/DL (ref 3.5–5)
ALBUMIN/GLOB SERPL: 1.3 G/DL (ref 1–2)
ALP SERPL-CCNC: 95 U/L (ref 38–126)
ALT SERPL W P-5'-P-CCNC: 32 U/L (ref 13–69)
ANION GAP SERPL CALCULATED.3IONS-SCNC: 11.3 MMOL/L
ANTI-D: NORMAL
AST SERPL-CCNC: 20 U/L (ref 15–46)
BACTERIA SPEC AEROBE CULT: NO GROWTH
BILIRUB SERPL-MCNC: 0.3 MG/DL (ref 0.2–1.3)
BLD GP AB SCN SERPL QL: POSITIVE
BUN BLD-MCNC: 20 MG/DL (ref 7–20)
BUN/CREAT SERPL: 22.2 (ref 7.1–23.5)
CALCIUM SPEC-SCNC: 8.5 MG/DL (ref 8.4–10.2)
CHLORIDE SERPL-SCNC: 101 MMOL/L (ref 98–107)
CO2 SERPL-SCNC: 24 MMOL/L (ref 26–30)
CREAT BLD-MCNC: 0.9 MG/DL (ref 0.6–1.3)
DEPRECATED RDW RBC AUTO: 49.1 FL (ref 37–54)
ERYTHROCYTE [DISTWIDTH] IN BLOOD BY AUTOMATED COUNT: 15.4 % (ref 11.5–14.5)
GFR SERPL CREATININE-BSD FRML MDRD: 59 ML/MIN/1.73
GLOBULIN UR ELPH-MCNC: 2.1 GM/DL
GLUCOSE BLD-MCNC: 86 MG/DL (ref 74–98)
HCT VFR BLD AUTO: 24.4 % (ref 37–47)
HGB BLD-MCNC: 7.5 G/DL (ref 12–16)
MCH RBC QN AUTO: 26.7 PG (ref 27–31)
MCHC RBC AUTO-ENTMCNC: 30.7 G/DL (ref 30–37)
MCV RBC AUTO: 86.8 FL (ref 81–99)
PLATELET # BLD AUTO: 83 10*3/MM3 (ref 130–400)
PMV BLD AUTO: 11.4 FL (ref 6–12)
POTASSIUM BLD-SCNC: 3.3 MMOL/L (ref 3.5–5.1)
PROT SERPL-MCNC: 4.8 G/DL (ref 6.3–8.2)
RBC # BLD AUTO: 2.81 10*6/MM3 (ref 4.2–5.4)
RH BLD: NEGATIVE
RH BLD: NEGATIVE
SODIUM BLD-SCNC: 133 MMOL/L (ref 137–145)
WBC NRBC COR # BLD: 9.94 10*3/MM3 (ref 4.8–10.8)

## 2017-09-13 PROCEDURE — 25010000002 IRON SUCROSE PER 1 MG: Performed by: INTERNAL MEDICINE

## 2017-09-13 PROCEDURE — 86901 BLOOD TYPING SEROLOGIC RH(D): CPT

## 2017-09-13 PROCEDURE — 86870 RBC ANTIBODY IDENTIFICATION: CPT | Performed by: INTERNAL MEDICINE

## 2017-09-13 PROCEDURE — 86900 BLOOD TYPING SEROLOGIC ABO: CPT | Performed by: INTERNAL MEDICINE

## 2017-09-13 PROCEDURE — 86850 RBC ANTIBODY SCREEN: CPT | Performed by: INTERNAL MEDICINE

## 2017-09-13 PROCEDURE — 86900 BLOOD TYPING SEROLOGIC ABO: CPT

## 2017-09-13 PROCEDURE — 86922 COMPATIBILITY TEST ANTIGLOB: CPT

## 2017-09-13 PROCEDURE — 36430 TRANSFUSION BLD/BLD COMPNT: CPT

## 2017-09-13 PROCEDURE — 80053 COMPREHEN METABOLIC PANEL: CPT | Performed by: INTERNAL MEDICINE

## 2017-09-13 PROCEDURE — 86920 COMPATIBILITY TEST SPIN: CPT

## 2017-09-13 PROCEDURE — P9016 RBC LEUKOCYTES REDUCED: HCPCS

## 2017-09-13 PROCEDURE — 25010000002 CEFTRIAXONE: Performed by: INTERNAL MEDICINE

## 2017-09-13 PROCEDURE — 86901 BLOOD TYPING SEROLOGIC RH(D): CPT | Performed by: INTERNAL MEDICINE

## 2017-09-13 PROCEDURE — 99233 SBSQ HOSP IP/OBS HIGH 50: CPT | Performed by: INTERNAL MEDICINE

## 2017-09-13 PROCEDURE — 85027 COMPLETE CBC AUTOMATED: CPT | Performed by: INTERNAL MEDICINE

## 2017-09-13 RX ORDER — PHENAZOPYRIDINE HYDROCHLORIDE 100 MG/1
100 TABLET, FILM COATED ORAL DAILY PRN
COMMUNITY
End: 2017-09-14 | Stop reason: HOSPADM

## 2017-09-13 RX ORDER — WHITE PETROLATUM 450 MG/G
1 STICK TOPICAL
Status: DISCONTINUED | OUTPATIENT
Start: 2017-09-13 | End: 2017-09-15 | Stop reason: HOSPADM

## 2017-09-13 RX ADMIN — TRAZODONE HYDROCHLORIDE 50 MG: 50 TABLET ORAL at 20:41

## 2017-09-13 RX ADMIN — CEFTRIAXONE 1 G: 1 INJECTION, POWDER, FOR SOLUTION INTRAMUSCULAR; INTRAVENOUS at 01:37

## 2017-09-13 RX ADMIN — SODIUM CHLORIDE 75 ML/HR: 9 INJECTION, SOLUTION INTRAVENOUS at 06:10

## 2017-09-13 RX ADMIN — LATANOPROST 1 DROP: 50 SOLUTION OPHTHALMIC at 20:47

## 2017-09-13 RX ADMIN — LEVOTHYROXINE SODIUM 100 MCG: 100 TABLET ORAL at 06:10

## 2017-09-13 RX ADMIN — AMLODIPINE BESYLATE 2.5 MG: 5 TABLET ORAL at 09:41

## 2017-09-13 RX ADMIN — METOPROLOL TARTRATE 50 MG: 50 TABLET ORAL at 09:41

## 2017-09-13 RX ADMIN — METOPROLOL TARTRATE 50 MG: 50 TABLET ORAL at 20:42

## 2017-09-13 RX ADMIN — ACETAMINOPHEN 650 MG: 325 TABLET, FILM COATED ORAL at 20:41

## 2017-09-13 RX ADMIN — PANTOPRAZOLE SODIUM 40 MG: 40 TABLET, DELAYED RELEASE ORAL at 06:10

## 2017-09-13 RX ADMIN — ATORVASTATIN CALCIUM 40 MG: 40 TABLET, FILM COATED ORAL at 09:41

## 2017-09-13 RX ADMIN — MIRTAZAPINE 30 MG: 15 TABLET, FILM COATED ORAL at 20:42

## 2017-09-13 RX ADMIN — Medication: at 09:42

## 2017-09-13 RX ADMIN — ACETAMINOPHEN 650 MG: 325 TABLET, FILM COATED ORAL at 16:22

## 2017-09-13 RX ADMIN — IRON SUCROSE 200 MG: 20 INJECTION, SOLUTION INTRAVENOUS at 10:15

## 2017-09-13 NOTE — PROGRESS NOTES
Discharge Planning Assessment  [facility]     Patient Name: Jose M Green  MRN: [id number]  Today's Date: 9/13/2017    Admit Date: 9/11/2017          Discharge Needs Assessment     None            Discharge Plan       09/13/17 1544    Case Management/Social Work Plan    Plan Call from [facility] with [facility].  They can offer the patient a rehab bed and will hold as long as they can.  Urmila will contact patients daughter to inform.  Nurse updated.      09/13/17 1430    Case Management/Social Work Plan    Plan SW asked to look at POA paperwork. Family ha swritten a letter , signed and notarizing it to defer decisions on patient to a second daughter. Discussed with Rudy Bradley and will ask nurse to conact her to confirm patient's ability to give consent.         Discharge Placement     Facility/Agency Request Status Selected? Address Phone Number Fax Number    [facility] Pending - Request Sent     [address] [place] [address] [phone]        Becka Harper 9/13/2017 11:32    Rehab referral                           Demographic Summary     None            Functional Status     None            Psychosocial     None            Abuse/Neglect     None            Legal     None            Substance Abuse     None            Patient Forms     None          Becka Harper

## 2017-09-13 NOTE — PROGRESS NOTES
[facility]    Name:  Jose M Green   Age:  86 y.o.  Sex:  female  :  1931  MRN:  [id number]   Visit Number:  [id number]  Admission Date:  2017  Date Of Service:  17  Primary Care Physician:  Speedy Bravo MD     LOS: 1 day :  Patient Care Team:  Speedy Bravo MD as PCP - General  Speedy Bravo MD as PCP - Family Medicine  Speedy Bravo MD as PCP - Claims Attributed  Speedy Bravo MD:    Chief Complaint:      Feels somewhat better has had a good breakfast. Continues to have some blood tinged fluid through bladder irrigation    Subjective / Interval History:     Underwent cystoscopy which still showed significant amount of blood. Biopsies taken    Review of Systems:   Review of Systems   Constitutional: Negative.  Negative for activity change, appetite change, fatigue and fever.   HENT: Negative for congestion, ear discharge, ear pain and trouble swallowing.    Eyes: Negative for photophobia and visual disturbance.   Respiratory: Negative for cough and shortness of breath.    Cardiovascular: Positive for leg swelling. Negative for chest pain and palpitations.   Gastrointestinal: Negative for abdominal distention, abdominal pain, constipation, diarrhea, nausea and vomiting.   Endocrine: Negative.    Genitourinary: Positive for frequency and hematuria. Negative for dysuria and urgency.   Musculoskeletal: Negative for arthralgias, back pain, joint swelling and myalgias.   Skin: Negative for color change and rash.   Allergic/Immunologic: Negative.    Neurological: Negative for dizziness, weakness, light-headedness and headaches.   Hematological: Negative for adenopathy. Does not bruise/bleed easily.   Psychiatric/Behavioral: Positive for sleep disturbance. Negative for agitation, confusion and dysphoric mood. The patient is not nervous/anxious.          Vital Signs:    Temp:  [97.2 °F (36.2 °C)-100.2 °F (37.9 °C)] 98.9 °F (37.2 °C)  Heart  Rate:  [] 79  Resp:  [16-21] 19  BP: (116-172)/() 128/69    Intake and output:      Intake/Output Summary (Last 24 hours) at 09/13/17 0829  Last data filed at 09/13/17 0610   Gross per 24 hour   Intake             1975 ml   Output             1815 ml   Net              160 ml          Physical Examination:  Physical Exam   Constitutional: She is oriented to person, place, and time. She appears well-developed and well-nourished. No distress.   HENT:   Nose: Nose normal.   Mouth/Throat: Oropharynx is clear and moist.   Eyes: Conjunctivae and EOM are normal. No scleral icterus.   Neck: No tracheal deviation present. No thyromegaly present.   Cardiovascular: Normal rate and regular rhythm.  Exam reveals no friction rub.    No murmur heard.  Pulmonary/Chest: No respiratory distress. She has no wheezes. She has no rales.   Abdominal: Soft. She exhibits no distension and no mass. There is no tenderness. There is no guarding.   Genitourinary:   Genitourinary Comments: Blood-tinged urine noted in Ross catheter   Musculoskeletal: Normal range of motion. She exhibits tenderness and deformity.   Lymphadenopathy:     She has no cervical adenopathy.   Neurological: She is alert and oriented to person, place, and time. She has normal reflexes. No cranial nerve deficit. Coordination normal.   Skin: Skin is warm and dry. No rash noted. No erythema.   Psychiatric: She has a normal mood and affect. Her behavior is normal. Judgment and thought content normal.         Laboratory results:    Lab Results (last 24 hours)     Procedure Component Value Units Date/Time    Blood Culture [[id number]]  (Normal) Collected:  09/11/17 2341    Specimen:  Blood from Arm, Left Updated:  09/13/17 0004     Blood Culture No growth at 24 hours    Blood Culture [[id number]]  (Normal) Collected:  09/11/17 2341    Specimen:  Blood from Arm, Right Updated:  09/13/17 0004     Blood Culture No growth at 24 hours    Urine Culture [[id number]]  (Normal)  Collected:  09/11/17 2044    Specimen:  Urine from Urine, Catheter Updated:  09/13/17 0536     Urine Culture No growth    CBC (No Diff) [[id number]]  (Abnormal) Collected:  09/13/17 0554    Specimen:  Blood Updated:  09/13/17 0631     WBC 9.94 10*3/mm3      RBC 2.81 (L) 10*6/mm3      Hemoglobin 7.5 (C) g/dL      Hematocrit 24.4 (L) %      MCV 86.8 fL      MCH 26.7 (L) pg      MCHC 30.7 g/dL      RDW 15.4 (H) %      RDW-SD 49.1 fl      MPV 11.4 fL      Platelets 83 (L) 10*3/mm3     Comprehensive Metabolic Panel [[id number]]  (Abnormal) Collected:  09/13/17 0554    Specimen:  Blood Updated:  09/13/17 0639     Glucose 86 mg/dL      BUN 20 mg/dL      Creatinine 0.90 mg/dL      Sodium 133 (L) mmol/L      Potassium 3.3 (L) mmol/L      Chloride 101 mmol/L      CO2 24.0 (L) mmol/L      Calcium 8.5 mg/dL      Total Protein 4.8 (L) g/dL      Albumin 2.70 (L) g/dL      ALT (SGPT) 32 U/L      AST (SGOT) 20 U/L      Alkaline Phosphatase 95 U/L      Total Bilirubin 0.3 mg/dL      eGFR Non African Amer 59 (L) mL/min/1.73      Globulin 2.1 gm/dL      A/G Ratio 1.3 g/dL      BUN/Creatinine Ratio 22.2     Anion Gap 11.3 mmol/L     Narrative:       The MDRD GFR formula is only valid for adults with stable renal function between ages 18 and 70.        I have reviewed the patient's laboratory results.    Radiology results:    Imaging Results (last 24 hours)     ** No results found for the last 24 hours. **          I have reviewed the patient's radiology reports.    Medication Review:     I have reviewed the patients active and prn medications.       Assessment & Plan:  Active Problems:    Chronic lymphocytic leukemiaStable anemia secondary to hematuria will transfuse her a unit of blood (iron infusion after that    Paroxysmal atrial fibrillation. Rate controlled okay will have to hold off on an anticoagulant therapy in view of her significant hematuria causing anemia discussed this with patient and her daughter they are agreeable with  the plan    CAD (coronary artery disease). BP okay continue with the statins. Antiplatelet agents. Discussed risk with daughter who understands and is agreeable with the plan    Acute cystitis with hematuria on empiric antibiotic therapy cultures are negative awaiting biopsy results. Discussed this over the phone with Dr. Avalos. Will continue bladder irrigation to complete clearance. Renal function okay    Blood clot in bladder          Speedy Bravo MD  09/13/17  8:29 AM

## 2017-09-13 NOTE — PROGRESS NOTES
"Patient: Jose M Green  Procedure(s):  CYSTOSCOPY CYSTOGRAM, EVACUATION OF BLADDER CLOTS  CYSTOSCOPY BLADDER BIOPSY  Anesthesia type: [unfilled]    Patient location: [facility]  Last vitals: /69 (BP Location: Right arm, Patient Position: Lying)  Pulse 79  Temp 98.9 °F (37.2 °C)  Resp 19  Ht 65\" (165.1 cm)  Wt 140 lb 14.4 oz (63.9 kg)  SpO2 97%  BMI 23.45 kg/m2  Level of consciousness: awake, alert and oriented    Post-anesthesia pain: adequate analgesia  Airway patency: patent  Respiratory: unassisted  Cardiovascular: stable and blood pressure at baseline  Hydration: euvolemic    Anesthetic complications: no     "

## 2017-09-13 NOTE — SIGNIFICANT NOTE
09/13/17 1349   Rehab Treatment   Discipline physical therapy assistant   Treatment Not Performed patient/family declined treatment  (Pt reports being too tired to participate with therpay this date.  Will f/u with pt tomorrow.  )

## 2017-09-13 NOTE — DISCHARGE PLACEMENT REQUEST
"Referral for short term rehab    Becka Harper RN  [phone] fax      Jose M Green (86 y.o. Female)     Date of Birth Social Security Number Address Home Phone MRN    06/23/1931  [address] [place], [place] [address] [phone] [id number]    Jain Marital Status          [place]        Admission Date Admission Type Admitting Provider Attending Provider Department, Room/Bed    9/11/17 Emergency Smith Ferrell DO Patel, Ashish M, MD [facility] MED SURG  4, 423/1    Discharge Date Discharge Disposition Discharge Destination                      Attending Provider: Speedy Bravo MD     Allergies:  Codeine, Contrast Dye, Aspirin    Isolation:  None   Infection:  None   Code Status:  Conditional    Ht:  65\" (165.1 cm)   Wt:  140 lb 14.4 oz (63.9 kg)    Admission Cmt:  None   Principal Problem:  None                Active Insurance as of 9/11/2017     Primary Coverage     Payor Plan Insurance Group Employer/Plan Group    MEDICARE MEDICARE A & B      Payor Plan Address Payor Plan Phone Number Effective From Effective To    [address] [phone] 6/1/1996     [place] [address]       Subscriber Name Subscriber Birth Date Member ID       JOSE M GREEN 6/23/1931 [id number]           Secondary Coverage     Payor Plan Insurance Group Employer/Plan Group    [place]ICA[place]      Payor Plan Address Payor Plan Phone Number Effective From Effective To    [address] [address] [phone] 6/14/2016     [place] [address]       Subscriber Name Subscriber Birth Date Member ID       JOSE M GREEN 6/23/1931 [id number]                 Emergency Contacts      (Rel.) Home Phone Work Phone Mobile Phone    Toya Ying (Daughter) -- -- [phone]    Ant Francois (Relative) [phone] -- --            Insurance Information                MEDICARE/MEDICARE A & B Phone: [phone]    Subscriber: Jose M Green Subscriber#: [id number] "    Group#:  Precert#:         [facility] MEDICAID Phone: [phone]    Subscriber: Jose M Green Subscriber#: [id number]    Group#:  Precert#:              History & Physical      Smith GARCIA Himarly  at 2017  2:04 AM              [facility]   HISTORY AND PHYSICAL      Name:  Jose M Green   Age:  86 y.o.  Sex:  female  :  1931  MRN:  [id number]   Visit Number:  [id number]  Admission Date:  2017  Date Of Service:  17  Primary Care Physician:  Speedy Bravo MD    History Obtained From:    patient    Chief Complaint:     Hematuria     History Of Presenting Illness:      Patient is a 86-year-old  female with history of CLL, hypertension, DVT, A. fib, hypothyroidism comes in with a 3 to four-month history of UTIs.  She taken multiple antibiotics.  She took Ceftin in August, she is currently on Macrobid now.  She claims that she has mild to moderate suprapubic discomfort.  She has a fever of 100.7.  The worst pain is in the left flank.  She has no pain at present.  She has had some shortness of breath with dyspnea on exertion.  She has had urinary incontinence with hematuria.  She did see a urologist, but has not had a cystoscopy.  This appears to be Dr. Mixon.  She claims she is eating and drinking okay.  She is on Eliquis, aspirin and Plavix.  She follows with Dr. Ramirez.  Recently Plavix was stopped.  She claims she's been incontinent for 2-3 weeks.  This is severe, and there is gross blood in the urine.  She had a lot of pain tonight and was unable to urinate so was brought into the emergency room.  She does have CLL, but has not seen Dr. Newman in 3-4 months.  She had previously been on chemotherapy.  Dr. Avalos was consulted in the emergency room, recommendation was made for a three-way catheter, however this was unable to be placed.  Patient does has a regular Ross catheter at this point.  Dr. Avalos will see in the  morning.      Review Of Systems:     General ROS: positive for  - chills and fever  Psychological ROS: negative  Ophthalmic ROS: negative  ENT ROS: negative  Allergy and Immunology ROS: negative  Hematological and Lymphatic ROS: positive for - bleeding problems, blood clots and fatigue  Endocrine ROS: negative  Breast ROS: negative  Respiratory ROS: positive for - shortness of breath  Cardiovascular ROS: positive for - dyspnea on exertion  Gastrointestinal ROS: negative  Genito-Urinary ROS: positive for - hematuria and urinary frequency/urgency  Musculoskeletal ROS: negative  Neurological ROS: negative  Dermatological ROS: negative       Past Medical History:    CLL diagnosed 1-1/2 years ago.  DVT, hypertension, nephrolithiasis,  atrial fibrillation    Past Surgical history:    , total abdominal hysterectomy, left arm plate for a fracture, varicose vein surgery, cardioversion      Social History:    Nonsmoker, nondrinker, does not use drugs, lives right next door to her daughter and she states she would not want CPR, but would be intubated if necessary.    Family History:    Other day of stomach cancer, brother  of lung cancer, brother  of leukemia, brother  of an MI.  Mother  of end-stage renal disease    Allergies:      Codeine; Contrast dye; and Aspirin    Home Medications:    Prior to Admission Medications     Prescriptions Last Dose Informant Patient Reported? Taking?    aspirin 81 MG EC tablet   No Yes    Take 1 tablet by mouth Daily.    atorvastatin (LIPITOR) 40 MG tablet   No Yes    Take 1 tablet by mouth Daily.    cefuroxime (CEFTIN) 250 MG tablet   No Yes    Take 1 tablet by mouth 2 (Two) Times a Day.     MG capsule   No Yes    TAKE 1 CAPSULE BY MOUTH ONE TIME A DAY     ELIQUIS 2.5 MG tablet tablet   No Yes    TAKE 1 TABLET BY MOUTH TWO TIMES A DAY     ESTRACE VAGINAL 0.1 MG/GM vaginal cream   Yes Yes    INSERT 0.1 G 3 TIMES A WEEK BY VAGINAL ROUTE AS DIRECTED    latanoprost  (XALATAN) 0.005 % ophthalmic solution   Yes Yes    1 drop every night.    levothyroxine (SYNTHROID, LEVOTHROID) 100 MCG tablet   No Yes    TAKE 1 TABLET BY MOUTH ONE TIME A DAY     losartan-hydrochlorothiazide (HYZAAR) 100-25 MG per tablet   Yes Yes    Take 1 tablet by mouth Daily.    metoprolol tartrate (LOPRESSOR) 50 MG tablet   No Yes    TAKE 1 TABLET BY MOUTH TWO TIMES A DAY     mirtazapine (REMERON) 30 MG tablet   No Yes    TAKE 1 TABLET BY MOUTH IN THE EVENING     moexipril (UNIVASC) 7.5 MG tablet   Yes Yes    Take 7.5 mg by mouth Every Night.    nitrofurantoin (MACRODANTIN) 100 MG capsule   Yes Yes    Take 100 mg by mouth 2 (Two) Times a Day.    omeprazole (priLOSEC) 20 MG capsule   No Yes    TAKE 1 CAPSULE BY MOUTH ONE TIME A DAY     oxybutynin XL (DITROPAN-XL) 10 MG 24 hr tablet   No Yes    Take 1 tablet by mouth Daily.    traZODone (DESYREL) 50 MG tablet   No Yes    TAKE 1 TABLET BY MOUTH AT BEDTIME AS NEEDED    clopidogrel (PLAVIX) 75 MG tablet   No No    TAKE 1 TABLET BY MOUTH ONE TIME A DAY              Hospital Scheduled Meds:      atorvastatin 40 mg Oral Daily   azithromycin 500 mg Intravenous Q24H   ceftriaxone 1 g Intravenous Q24H   latanoprost 1 drop Both Eyes Nightly   levothyroxine 100 mcg Oral Q AM   losartan-hydrochlorothiazide 2 tablet Oral Q24H   metoprolol tartrate 50 mg Oral Q12H   mirtazapine 30 mg Oral Nightly   moexipril 7.5 mg Oral Nightly   pantoprazole 40 mg Oral QAM         sodium chloride 75 mL/hr Last Rate: 75 mL/hr (09/12/17 0106)        Vital Signs:    Temp:  [98.7 °F (37.1 °C)-100.7 °F (38.2 °C)] 98.7 °F (37.1 °C)  Heart Rate:  [96-98] 96  Resp:  [18-20] 20  BP: (156-178)/(83-98) 156/83    Last 3 weights    09/11/17 1949   Weight: 135 lb (61.2 kg)       Body mass index is 22.47 kg/(m^2).    Physical Exam:      General Appearance:    Alert, cooperative, in no acute distress   Head:    Normocephalic, without obvious abnormality, atraumatic   Eyes:            Lids and lashes  normal, conjunctivae and sclerae normal, no   icterus, no pallor, corneas clear, PERRLA   Ears:    Ears appear intact with no abnormalities noted   Throat:   No oral lesions, no thrush, oral mucosa moist   Neck:   No adenopathy, supple, trachea midline, no thyromegaly, no     carotid bruit, no JVD   Back:     No kyphosis present, no scoliosis present, no skin lesions,       erythema or scars, no tenderness to percussion or                   palpation,   range of motion normal   Lungs:     Clear to auscultation,respirations regular, even and                   unlabored    Heart:    Regular rhythm and normal rate, normal S1 and S2, no            murmur, no gallop, no rub, no click   Breast Exam:    Deferred   Abdomen:     Normal bowel sounds, no masses, no organomegaly, soft        non-tender, non-distended, no guarding, no rebound                 tenderness   Genitalia:    Deferred   Extremities:   Moves all extremities well, no edema, no cyanosis, no              redness   Pulses:   Pulses palpable and equal bilaterally   Skin:   No bleeding, bruising or rash   Lymph nodes:   No palpable adenopathy   Neurologic:   Cranial nerves 2 - 12 grossly intact, sensation intact, DTR        present and equal bilaterally           Labs:    Results from last 7 days  Lab Units 09/11/17  2347 09/11/17  2044   LACTATE mmol/L 0.9  --    WBC 10*3/mm3  --  18.17*   HEMOGLOBIN g/dL  --  10.4*   HEMATOCRIT %  --  32.9*   MCV fL  --  85.2   MCHC g/dL  --  31.6   PLATELETS 10*3/mm3  --  165           Results from last 7 days  Lab Units 09/11/17  2044   SODIUM mmol/L 129*   POTASSIUM mmol/L 4.2   CHLORIDE mmol/L 94*   CO2 mmol/L 25.0*   BUN mg/dL 15   CREATININE mg/dL 0.80   EGFR IF NONAFRICN AM mL/min/1.73 68   CALCIUM mg/dL 9.5   GLUCOSE mg/dL 129*   ALBUMIN g/dL 4.10   BILIRUBIN mg/dL 1.1   ALK PHOS U/L 142*   AST (SGOT) U/L 42   ALT (SGPT) U/L 30   Estimated Creatinine Clearance: 48.8 mL/min (by C-G formula based on Cr of 0.8).  No  results found for: AMMONIA          No results found for: HGBA1C  Lab Results   Component Value Date    TSH 5.460 (H) 07/26/2017     No results found for: PREGTESTUR, PREGSERUM, HCG, HCGQUANT  Pain Management Panel     There is no flowsheet data to display.                          Radiology:    Imaging Results (last 7 days)     Procedure Component Value Units Date/Time    CT Abdomen Pelvis Without Contrast [[id number]] Collected:  09/11/17 2138     Updated:  09/11/17 2140    Narrative:       FINAL REPORT    CLINICAL HISTORY:  BILAT FLANK PAIN, FEVER, HEMATURIA.    FINDINGS:  Multiple contiguous transaxial slices through the abdomen and pelvis were obtained without the intravenous administration of contrast with coronal reformatted images.    The bladder is enlarged with irregular wall thickening and prominent stranding of the adjacent fat. There is    High attenuation luminal content most consistent with bladder hemorrhage. This may be secondary to a hemorrhagic cystitis or a bleeding neoplasm which is not really identified. Recommend cystoscopy. There is moderate to severe right hydroureteronephrosis   and mild left hydroureteronephrosis. Findings are likely secondary to mechanical obstruction.    There is bilateral lower lobe atelectasis with moderate pleural effusions. There is a pericardial effusion. The gallbladder is dilated with a moderate stone. The liver, spleen, pancreas, adrenal glands appear normal. The aorta and iliac arteries are   calcified. The bowel gas pattern is nonobstructive. The appendix is not visualized but there are no secondary signs suggest appendicitis.    Impression:    Irregular bladder wall thickening with adjacent fat stranding in luminal hemorrhage. Recommend cystoscopy    Bilateral hydroureteronephrosis suggesting mechanical obstruction    Bilateral pleural effusions, pericardial effusion and airspace disease    Cholelithiasis      Impression:       Authenticated by John  MD Leroy on 09/11/2017 09:38:46 PM          Assessment:    1.  Recurrent UTI  2.  Bladder hemorrhage with blood clots   3.  Bilateral pleural effusion with possible bilateral airspace disease  4.  Pericardial effusion  5.  Bilateral hydroureteronephrosis  6.  Asymptomatic cholelithiasis  7.  CLL status post chemotherapy  8.  Hypertension  9.  History of atrial fibrillation    Plan:     We will hold Plavix, Eliquis and aspirin.  Consult Dr. Avalos.  Check echocardiogram given the pericardial effusion.  Check blood and urine cultures.  Dr. Avalos to place a 3-way catheter.  Place patient on Rocephin and Zithromax.  DuoNeb when necessary.  Check lab work in the a.m.  Place on her home medicines.  Further recommendations will depend on the clinical course.  Anticipated stay is greater than 2 midnights.  We will consult PT and OT as well.  Keep nothing by mouth for now, in case there needs to be bladder procedure in the a.m.    Smith Ferrell DO  09/12/17  2:04 AM     Electronically signed by Smith Ferrell DO at 9/12/2017  2:15 AM        Vital Signs (last 24 hours)       09/12 0700  -  09/13 0659 09/13 0700  -  09/13 1130   Most Recent    Temp (°F) 97.2 -  100.2      98.8     98.8 (37.1)    Heart Rate 79 -  100    67 -  90     90    Resp 16 -  21      20     20    /63 -  172/55    134/64 -  138/67     134/64    SpO2 (%) (!)90 -  100      99     99          Hospital Medications (active)       Dose Frequency Start End    acetaminophen (TYLENOL) tablet 650 mg 650 mg Every 4 Hours PRN 9/12/2017     Sig - Route: Take 2 tablets by mouth Every 4 (Four) Hours As Needed for Mild Pain . - Oral    amLODIPine (NORVASC) tablet 2.5 mg 2.5 mg Every 24 Hours Scheduled 9/12/2017     Sig - Route: Take 0.5 tablets by mouth Daily. - Oral    atorvastatin (LIPITOR) tablet 40 mg 40 mg Daily 9/12/2017     Sig - Route: Take 1 tablet by mouth Daily. - Oral    cefTRIAXone (ROCEPHIN) 1 g/100 mL 0.9% NS VTB (mbp) 1 g Every 24  "Hours 9/13/2017     Sig - Route: Infuse 100 mL into a venous catheter Daily. - Intravenous    iopamidol (ISOVUE-300) 61 % injection  - ADS Override Pull   9/12/2017 9/13/2017    Notes to Pharmacy: Created by cabinet override    ipratropium-albuterol (DUO-NEB) nebulizer solution 3 mL 3 mL Every 6 Hours PRN 9/12/2017     Sig - Route: Take 3 mL by nebulization Every 6 (Six) Hours As Needed for Shortness of Air. - Nebulization    iron sucrose (VENOFER) 200 mg in sodium chloride 0.9 % 100 mL IVPB 200 mg Once 9/13/2017 9/13/2017    Sig - Route: Infuse 200 mg into a venous catheter 1 (One) Time. - Intravenous    latanoprost (XALATAN) 0.005 % ophthalmic solution 1 drop 1 drop Nightly 9/12/2017     Sig - Route: Administer 1 drop to both eyes Every Night. - Both Eyes    levothyroxine (SYNTHROID, LEVOTHROID) tablet 100 mcg 100 mcg Every Early Morning 9/12/2017     Sig - Route: Take 1 tablet by mouth Every Morning. - Oral    metoprolol tartrate (LOPRESSOR) tablet 50 mg 50 mg Every 12 Hours Scheduled 9/12/2017     Sig - Route: Take 1 tablet by mouth Every 12 (Twelve) Hours. - Oral    mirtazapine (REMERON) tablet 30 mg 30 mg Nightly 9/12/2017     Sig - Route: Take 2 tablets by mouth Every Night. - Oral    morphine injection 2 mg 2 mg Every 4 Hours PRN 9/12/2017 9/22/2017    Sig - Route: Infuse 1 mL into a venous catheter Every 4 (Four) Hours As Needed for Moderate Pain . - Intravenous    Linked Group 1:  \"And\" Linked Group Details        naloxone (NARCAN) injection 0.4 mg 0.4 mg Every 5 Minutes PRN 9/12/2017     Sig - Route: Infuse 1 mL into a venous catheter Every 5 (Five) Minutes As Needed for Respiratory Depression. - Intravenous    Linked Group 1:  \"And\" Linked Group Details        ondansetron (ZOFRAN) injection 4 mg 4 mg Every 6 Hours PRN 9/12/2017     Sig - Route: Infuse 2 mL into a venous catheter Every 6 (Six) Hours As Needed for Nausea or Vomiting. - Intravenous    pantoprazole (PROTONIX) EC tablet 40 mg 40 mg Every " Morning 9/12/2017     Sig - Route: Take 1 tablet by mouth Every Morning. - Oral    Pharmacy Meds to Bed Consult  Daily (Monday-Friday) 9/12/2017     Sig - Route: Daily (Monday-Friday). - Does not apply    sodium chloride 0.9 % flush 1-10 mL 1-10 mL As Needed 9/12/2017     Sig - Route: Infuse 1-10 mL into a venous catheter As Needed for Line Care. - Intravenous    traZODone (DESYREL) tablet 50 mg 50 mg Nightly PRN 9/12/2017     Sig - Route: Take 1 tablet by mouth At Night As Needed for Sleep. - Oral    dexamethasone (DECADRON) injection (Discontinued)  As Needed 9/12/2017 9/12/2017    Sig - Route: Infuse  into a venous catheter As Needed. - Intravenous    Reason for Discontinue: Anesthesia Stop    FentaNYL Citrate (PF) (SUBLIMAZE) injection (Discontinued)  As Needed 9/12/2017 9/12/2017    Sig - Route: Infuse  into a venous catheter As Needed for Severe Pain . - Intravenous    Reason for Discontinue: Anesthesia Stop    HYDROmorphone (DILAUDID) injection 0.5 mg (Discontinued) 0.5 mg Every 10 Minutes PRN 9/12/2017 9/12/2017    Sig - Route: Infuse 0.5 mL into a venous catheter Every 10 (Ten) Minutes As Needed for Severe Pain . - Intravenous    Reason for Discontinue: Patient Transfer    iopamidol (ISOVUE-300) 61 % injection  - ADS Override Pull (Discontinued)   9/12/2017 9/12/2017    Notes to Pharmacy: Created by cabinet override    Reason for Discontinue: Returned to ADS    iothalamate (CONRAY) injection (Discontinued)  As Needed 9/12/2017 9/12/2017    Sig: As Needed.    Reason for Discontinue: Patient Discharge    lactated ringers infusion 1,000 mL (Discontinued) 1,000 mL Continuous PRN 9/12/2017 9/12/2017    Sig - Route: Infuse 1,000 mL into a venous catheter Continuous As Needed (Start Prior to Surgery). - Intravenous    Reason for Discontinue: Patient Transfer    levoFLOXacin (LEVAQUIN) 500 mg/100 mL D5W (premix) (LEVAQUIN) 500 mg (Discontinued) 500 mg 30 min pre-op 9/12/2017 9/12/2017    Sig - Route: Infuse 100 mL  into a venous catheter 30 Min Pre-Op. - Intravenous    Reason for Discontinue: Patient Transfer    lidocaine (cardiac) (XYLOCAINE) injection (Discontinued)  As Needed 9/12/2017 9/12/2017    Sig - Route: Infuse  into a venous catheter As Needed. - Intravenous    Reason for Discontinue: Anesthesia Stop    meperidine (DEMEROL) injection 50 mg (Discontinued) 50 mg Once 9/12/2017 9/12/2017    Sig - Route: Infuse 1 mL into a venous catheter 1 (One) Time. - Intravenous    Reason for Discontinue: Patient Transfer    morphine injection 2 mg (Discontinued) 2 mg Every 10 Minutes PRN 9/12/2017 9/12/2017    Sig - Route: Infuse 1 mL into a venous catheter Every 10 (Ten) Minutes As Needed for Severe Pain . - Intravenous    Reason for Discontinue: Patient Transfer    ondansetron (ZOFRAN) injection 4 mg (Discontinued) 4 mg Once 9/12/2017 9/12/2017    Sig - Route: Infuse 2 mL into a venous catheter 1 (One) Time. - Intravenous    Reason for Discontinue: Patient Transfer    Phenylephrine HCl-NaCl (PF) syringe (Discontinued)  As Needed 9/12/2017 9/12/2017    Sig: As Needed.    Reason for Discontinue: Anesthesia Stop    Propofol (DIPRIVAN) injection (Discontinued)  As Needed 9/12/2017 9/12/2017    Sig - Route: Infuse  into a venous catheter As Needed. - Intravenous    Reason for Discontinue: Anesthesia Stop    sodium chloride (NS) irrigation solution (Discontinued)  As Needed 9/12/2017 9/12/2017    Sig: As Needed.    Reason for Discontinue: Patient Discharge    sodium chloride 0.9 % flush 3 mL (Discontinued) 3 mL As Needed 9/12/2017 9/12/2017    Sig - Route: Infuse 3 mL into a venous catheter As Needed for Line Care. - Intravenous    Reason for Discontinue: Patient Transfer    sodium chloride 0.9 % infusion (Discontinued) 75 mL/hr Continuous 9/11/2017 9/13/2017    Sig - Route: Infuse 75 mL/hr into a venous catheter Continuous. - Intravenous    Cosign for Ordering: Accepted by Ziyad Eason MD on 9/12/2017  1:08 AM    succinylcholine  (ANECTINE) injection (Discontinued)  As Needed 2017    Sig - Route: Infuse  into a venous catheter As Needed. - Intravenous    Reason for Discontinue: Anesthesia Stop             Physician Progress Notes (last 72 hours) (Notes from 9/10/2017 11:30 AM through 2017 11:30 AM)      Speedy Bravo MD at 2017 11:16 AM  Version 1 of 1               [facility]    [facility]ESS [facility]    Name:  Jose M Green   Age:  86 y.o.  Sex:  female  :  1931  MRN:  [id number]   Visit Number:  [id number]  Admission Date:  2017  Date Of Service:  17  Primary Care Physician:  Speedy Bravo MD     LOS: 0 days :  Patient Care Team:  Speedy Bravo MD as PCP - General  Speedy Bravo MD as PCP - Family Medicine  Speedy Bravo MD as PCP - Claims Attributed  Speedy Bravo MD:    Chief Complaint:      Hematuria, fever    Subjective / Interval History:     Currently awake and sitting up in chair is on IV fluids awaiting cystoscopy. Has had fever through the night.    Review of Systems:   Review of Systems   Constitutional: Negative.  Negative for activity change, appetite change, fatigue and fever.   HENT: Negative for congestion, ear discharge, ear pain and trouble swallowing.    Eyes: Negative for photophobia and visual disturbance.   Respiratory: Negative for cough and shortness of breath.    Cardiovascular: Positive for leg swelling. Negative for chest pain and palpitations.   Gastrointestinal: Positive for abdominal pain. Negative for abdominal distention, constipation, diarrhea, nausea and vomiting.   Endocrine: Negative.    Genitourinary: Positive for frequency, hematuria and urgency. Negative for dysuria.   Musculoskeletal: Negative for arthralgias, back pain, joint swelling and myalgias.   Skin: Negative for color change and rash.   Allergic/Immunologic: Negative.    Neurological: Negative for dizziness, weakness, light-headedness and headaches.   Hematological:  Negative for adenopathy. Does not bruise/bleed easily.   Psychiatric/Behavioral: Positive for sleep disturbance. Negative for agitation, confusion and dysphoric mood. The patient is nervous/anxious.          Vital Signs:    Temp:  [98.3 °F (36.8 °C)-103.1 °F (39.5 °C)] 98.3 °F (36.8 °C)  Heart Rate:  [] 115  Resp:  [18-20] 20  BP: (144-178)/(55-98) 172/55    Intake and output:      Intake/Output Summary (Last 24 hours) at 09/12/17 1116  Last data filed at 09/12/17 0108   Gross per 24 hour   Intake              100 ml   Output              300 ml   Net             -200 ml          Physical Examination:  Physical Exam   Constitutional: She is oriented to person, place, and time. She appears well-developed and well-nourished. No distress.   HENT:   Nose: Nose normal.   Mouth/Throat: Oropharynx is clear and moist.   Eyes: Conjunctivae and EOM are normal. No scleral icterus.   Neck: No tracheal deviation present. No thyromegaly present.   Cardiovascular: Normal rate and regular rhythm.  Exam reveals no friction rub.    No murmur heard.  Pulmonary/Chest: No respiratory distress. She has no wheezes. She has no rales.   Abdominal: Soft. She exhibits no distension and no mass. There is no tenderness. There is no guarding.   Genitourinary:   Genitourinary Comments: Blood-tinged urine noted in Ross catheter   Musculoskeletal: Normal range of motion. She exhibits tenderness and deformity.   Lymphadenopathy:     She has no cervical adenopathy.   Neurological: She is alert and oriented to person, place, and time. She has normal reflexes. No cranial nerve deficit. Coordination normal.   Skin: Skin is warm and dry. No rash noted. No erythema.   Psychiatric: She has a normal mood and affect. Her behavior is normal. Judgment and thought content normal.         Laboratory results:    Lab Results (last 24 hours)     Procedure Component Value Units Date/Time    CBC & Differential [[id number]] Collected:  09/11/17 2044    Specimen:   Blood Updated:  09/11/17 2055    Narrative:       The following orders were created for panel order CBC & Differential.  Procedure                               Abnormality         Status                     ---------                               -----------         ------                     CBC Auto Differential[[id number]]        Abnormal            Final result                 Please view results for these tests on the individual orders.    CBC Auto Differential [[id number]]  (Abnormal) Collected:  09/11/17 2044    Specimen:  Blood Updated:  09/11/17 2055     WBC 18.17 (H) 10*3/mm3      RBC 3.86 (L) 10*6/mm3      Hemoglobin 10.4 (L) g/dL      Hematocrit 32.9 (L) %      MCV 85.2 fL      MCH 26.9 (L) pg      MCHC 31.6 g/dL      RDW 15.5 (H) %      RDW-SD 47.7 fl      MPV 11.0 fL      Platelets 165 10*3/mm3      Neutrophil % 53.8 %      Lymphocyte % 38.1 %      Monocyte % 7.2 %      Eosinophil % 0.0 %      Basophil % 0.3 %      Immature Grans % 0.6 %      Neutrophils, Absolute 9.78 (H) 10*3/mm3      Lymphocytes, Absolute 6.92 (H) 10*3/mm3      Monocytes, Absolute 1.30 (H) 10*3/mm3      Eosinophils, Absolute 0.00 10*3/mm3      Basophils, Absolute 0.06 10*3/mm3      Immature Grans, Absolute 0.11 (H) 10*3/mm3      nRBC 0.0 /100 WBC     Comprehensive Metabolic Panel [[id number]]  (Abnormal) Collected:  09/11/17 2044    Specimen:  Blood Updated:  09/11/17 2119     Glucose 129 (H) mg/dL      BUN 15 mg/dL       Specimen hemolyzed. Results may be affected.        Creatinine 0.80 mg/dL      Sodium 129 (L) mmol/L      Potassium 4.2 mmol/L       Specimen hemolyzed.  Results may be affected.        Chloride 94 (L) mmol/L      CO2 25.0 (L) mmol/L      Calcium 9.5 mg/dL      Total Protein 6.9 g/dL      Albumin 4.10 g/dL      ALT (SGPT) 30 U/L       Specimen hemolyzed.  Results may be affected.        AST (SGOT) 42 U/L       Specimen hemolyzed.  Results may be affected.        Alkaline Phosphatase 142 (H) U/L       Specimen  hemolyzed. Results may be affected.        Total Bilirubin 1.1 mg/dL      eGFR Non African Amer 68 mL/min/1.73      Globulin 2.8 gm/dL      A/G Ratio 1.5 g/dL      BUN/Creatinine Ratio 18.8     Anion Gap 14.2 mmol/L     Narrative:       The MDRD GFR formula is only valid for adults with stable renal function between ages 18 and 70.    Urine Culture [[id number]] Collected:  09/11/17 2044    Specimen:  Urine from Urine, Catheter Updated:  09/11/17 2125    Urinalysis With / Culture If Indicated [[id number]]  (Abnormal) Collected:  09/11/17 2044    Specimen:  Urine from Urine, Catheter Updated:  09/11/17 2130     Color, UA Red (A)     Appearance, UA Cloudy (A)     pH, UA >=9.0 (H)     Specific Gravity, UA 1.010     Glucose,  mg/dL (2+) (A)     Ketones, UA >=160 mg/dL (4+) (A)     Bilirubin, UA Large (3+) (A)     Blood, UA Large (3+) (A)     Protein, UA >=300 mg/dL (3+) (A)     Leuk Esterase, UA Large (3+) (A)     Nitrite, UA Positive (A)     Urobilinogen, UA >=8.0 E.U./dL (A)    Urinalysis, Microscopic Only [[id number]]  (Abnormal) Collected:  09/11/17 2044    Specimen:  Urine from Urine, Catheter Updated:  09/11/17 2130     RBC, UA Too Numerous to Count (A) /HPF      WBC, UA  /HPF      Unable to determine due to loaded field (A)     Bacteria, UA  /HPF      Unable to determine due to loaded field (A)     Squamous Epithelial Cells, UA  /HPF      Unable to determine due to loaded field (A)     Hyaline Casts, UA  /LPF      Unable to determine due to loaded field     Methodology Manual Light Microscopy    Blood Culture [[id number]] Collected:  09/11/17 2341    Specimen:  Blood from Arm, Left Updated:  09/11/17 2347    Blood Culture [[id number]] Collected:  09/11/17 2341    Specimen:  Blood from Arm, Right Updated:  09/11/17 2347    Lactic Acid, Plasma [[id number]]  (Normal) Collected:  09/11/17 2347    Specimen:  Blood Updated:  09/12/17 0009     Lactate 0.9 mmol/L     CBC Auto Differential [[id number]]  (Abnormal)  Collected:  09/12/17 0529    Specimen:  Blood Updated:  09/12/17 0619     WBC 11.74 (H) 10*3/mm3      RBC 3.18 (L) 10*6/mm3      Hemoglobin 8.6 (L) g/dL      Hematocrit 27.4 (L) %      MCV 86.2 fL      MCH 27.0 pg      MCHC 31.4 g/dL      RDW 15.5 (H) %      RDW-SD 49.1 fl      MPV 11.5 fL      Platelets 103 (L) 10*3/mm3      Neutrophil % 57.9 %      Lymphocyte % 33.6 %      Monocyte % 7.4 %      Eosinophil % 0.0 %      Basophil % 0.3 %      Immature Grans % 0.8 (H) %      Neutrophils, Absolute 6.79 10*3/mm3      Lymphocytes, Absolute 3.95 (H) 10*3/mm3      Monocytes, Absolute 0.87 10*3/mm3      Eosinophils, Absolute 0.00 10*3/mm3      Basophils, Absolute 0.04 10*3/mm3      Immature Grans, Absolute 0.09 (H) 10*3/mm3      nRBC 0.0 /100 WBC     Basic Metabolic Panel [[id number]]  (Abnormal) Collected:  09/12/17 0529    Specimen:  Blood Updated:  09/12/17 0620     Glucose 115 (H) mg/dL      BUN 14 mg/dL      Creatinine 0.70 mg/dL      Sodium 130 (L) mmol/L      Potassium 3.5 mmol/L      Chloride 99 mmol/L      CO2 23.0 (L) mmol/L      Calcium 9.0 mg/dL      eGFR Non African Amer 79 mL/min/1.73      BUN/Creatinine Ratio 20.0     Anion Gap 11.5 mmol/L     Narrative:       The MDRD GFR formula is only valid for adults with stable renal function between ages 18 and 70.        I have reviewed the patient's laboratory results.    Radiology results:    Imaging Results (last 24 hours)     Procedure Component Value Units Date/Time    CT Abdomen Pelvis Without Contrast [[id number]] Collected:  09/11/17 2138     Updated:  09/11/17 2140    Narrative:       FINAL REPORT    CLINICAL HISTORY:  BILAT FLANK PAIN, FEVER, HEMATURIA.    FINDINGS:  Multiple contiguous transaxial slices through the abdomen and pelvis were obtained without the intravenous administration of contrast with coronal reformatted images.    The bladder is enlarged with irregular wall thickening and prominent stranding of the adjacent fat. There is    High attenuation  luminal content most consistent with bladder hemorrhage. This may be secondary to a hemorrhagic cystitis or a bleeding neoplasm which is not really identified. Recommend cystoscopy. There is moderate to severe right hydroureteronephrosis   and mild left hydroureteronephrosis. Findings are likely secondary to mechanical obstruction.    There is bilateral lower lobe atelectasis with moderate pleural effusions. There is a pericardial effusion. The gallbladder is dilated with a moderate stone. The liver, spleen, pancreas, adrenal glands appear normal. The aorta and iliac arteries are   calcified. The bowel gas pattern is nonobstructive. The appendix is not visualized but there are no secondary signs suggest appendicitis.    Impression:    Irregular bladder wall thickening with adjacent fat stranding in luminal hemorrhage. Recommend cystoscopy    Bilateral hydroureteronephrosis suggesting mechanical obstruction    Bilateral pleural effusions, pericardial effusion and airspace disease    Cholelithiasis      Impression:       Authenticated by John Harper MD on 09/11/2017 09:38:46 PM          I have reviewed the patient's radiology reports.    Medication Review:     I have reviewed the patients active and prn medications.       Assessment & Plan:  Active Problems:    Chronic lymphocytic leukemia has been following up with oncology. Follow CBC platelet count and RBC count slightly low.    Paroxysmal atrial fibrillation currently in regular rhythm. Sinus rhythm noted on EKG on admission. Anticoagulant therapy discontinued in view of active bleed. Continue metoprolol    CAD (coronary artery disease) stable angina free history of elevated cardiac enzymes on her last admission less than a year ago    Acute cystitis with hematuria and empiric antibiotic therapy with Rocephin. Urology consult has been obtained awaiting cystoscopy    Blood clot in bladder    Discuss CODE STATUS with patient with a relative at bedside. Does  not want interventional measures aggressively no CPR intubation or cardioversion      Speedy Bravo MD  17  11:16 AM           Electronically signed by Speedy Bravo MD at 2017 11:24 AM      Speedy Bravo MD at 2017  8:29 AM  Version 1 of 1               [facility]    [facility]    Name:  Jose M Green   Age:  86 y.o.  Sex:  female  :  1931  MRN:  [id number]   Visit Number:  [id number]  Admission Date:  2017  Date Of Service:  17  Primary Care Physician:  Speedy Bravo MD     LOS: 1 day :  Patient Care Team:  Speedy Bravo MD as PCP - General  Speedy Bravo MD as PCP - Family Medicine  Speedy Bravo MD as PCP - Claims Attributed  Speedy Bravo MD:    Chief Complaint:      Feels somewhat better has had a good breakfast. Continues to have some blood tinged fluid through bladder irrigation    Subjective / Interval History:     Underwent cystoscopy which still showed significant amount of blood. Biopsies taken    Review of Systems:   Review of Systems   Constitutional: Negative.  Negative for activity change, appetite change, fatigue and fever.   HENT: Negative for congestion, ear discharge, ear pain and trouble swallowing.    Eyes: Negative for photophobia and visual disturbance.   Respiratory: Negative for cough and shortness of breath.    Cardiovascular: Positive for leg swelling. Negative for chest pain and palpitations.   Gastrointestinal: Negative for abdominal distention, abdominal pain, constipation, diarrhea, nausea and vomiting.   Endocrine: Negative.    Genitourinary: Positive for frequency and hematuria. Negative for dysuria and urgency.   Musculoskeletal: Negative for arthralgias, back pain, joint swelling and myalgias.   Skin: Negative for color change and rash.   Allergic/Immunologic: Negative.    Neurological: Negative for dizziness, weakness, light-headedness and headaches.   Hematological: Negative for adenopathy.  Does not bruise/bleed easily.   Psychiatric/Behavioral: Positive for sleep disturbance. Negative for agitation, confusion and dysphoric mood. The patient is not nervous/anxious.          Vital Signs:    Temp:  [97.2 °F (36.2 °C)-100.2 °F (37.9 °C)] 98.9 °F (37.2 °C)  Heart Rate:  [] 79  Resp:  [16-21] 19  BP: (116-172)/() 128/69    Intake and output:      Intake/Output Summary (Last 24 hours) at 09/13/17 0854  Last data filed at 09/13/17 0610   Gross per 24 hour   Intake             1975 ml   Output             1815 ml   Net              160 ml          Physical Examination:  Physical Exam   Constitutional: She is oriented to person, place, and time. She appears well-developed and well-nourished. No distress.   HENT:   Nose: Nose normal.   Mouth/Throat: Oropharynx is clear and moist.   Eyes: Conjunctivae and EOM are normal. No scleral icterus.   Neck: No tracheal deviation present. No thyromegaly present.   Cardiovascular: Normal rate and regular rhythm.  Exam reveals no friction rub.    No murmur heard.  Pulmonary/Chest: No respiratory distress. She has no wheezes. She has no rales.   Abdominal: Soft. She exhibits no distension and no mass. There is no tenderness. There is no guarding.   Genitourinary:   Genitourinary Comments: Blood-tinged urine noted in Ross catheter   Musculoskeletal: Normal range of motion. She exhibits tenderness and deformity.   Lymphadenopathy:     She has no cervical adenopathy.   Neurological: She is alert and oriented to person, place, and time. She has normal reflexes. No cranial nerve deficit. Coordination normal.   Skin: Skin is warm and dry. No rash noted. No erythema.   Psychiatric: She has a normal mood and affect. Her behavior is normal. Judgment and thought content normal.         Laboratory results:    Lab Results (last 24 hours)     Procedure Component Value Units Date/Time    Blood Culture [[id number]]  (Normal) Collected:  09/11/17 9987    Specimen:  Blood from  Arm, Left Updated:  09/13/17 0004     Blood Culture No growth at 24 hours    Blood Culture [[id number]]  (Normal) Collected:  09/11/17 2341    Specimen:  Blood from Arm, Right Updated:  09/13/17 0004     Blood Culture No growth at 24 hours    Urine Culture [[id number]]  (Normal) Collected:  09/11/17 2044    Specimen:  Urine from Urine, Catheter Updated:  09/13/17 0536     Urine Culture No growth    CBC (No Diff) [[id number]]  (Abnormal) Collected:  09/13/17 0554    Specimen:  Blood Updated:  09/13/17 0631     WBC 9.94 10*3/mm3      RBC 2.81 (L) 10*6/mm3      Hemoglobin 7.5 (C) g/dL      Hematocrit 24.4 (L) %      MCV 86.8 fL      MCH 26.7 (L) pg      MCHC 30.7 g/dL      RDW 15.4 (H) %      RDW-SD 49.1 fl      MPV 11.4 fL      Platelets 83 (L) 10*3/mm3     Comprehensive Metabolic Panel [[id number]]  (Abnormal) Collected:  09/13/17 0554    Specimen:  Blood Updated:  09/13/17 0639     Glucose 86 mg/dL      BUN 20 mg/dL      Creatinine 0.90 mg/dL      Sodium 133 (L) mmol/L      Potassium 3.3 (L) mmol/L      Chloride 101 mmol/L      CO2 24.0 (L) mmol/L      Calcium 8.5 mg/dL      Total Protein 4.8 (L) g/dL      Albumin 2.70 (L) g/dL      ALT (SGPT) 32 U/L      AST (SGOT) 20 U/L      Alkaline Phosphatase 95 U/L      Total Bilirubin 0.3 mg/dL      eGFR Non African Amer 59 (L) mL/min/1.73      Globulin 2.1 gm/dL      A/G Ratio 1.3 g/dL      BUN/Creatinine Ratio 22.2     Anion Gap 11.3 mmol/L     Narrative:       The MDRD GFR formula is only valid for adults with stable renal function between ages 18 and 70.        I have reviewed the patient's laboratory results.    Radiology results:    Imaging Results (last 24 hours)     ** No results found for the last 24 hours. **          I have reviewed the patient's radiology reports.    Medication Review:     I have reviewed the patients active and prn medications.       Assessment & Plan:  Active Problems:    Chronic lymphocytic leukemiaStable anemia secondary to hematuria will  transfuse her a unit of blood (iron infusion after that    Paroxysmal atrial fibrillation. Rate controlled okay will have to hold off on an anticoagulant therapy in view of her significant hematuria causing anemia discussed this with patient and her daughter they are agreeable with the plan    CAD (coronary artery disease). BP okay continue with the statins. Antiplatelet agents. Discussed risk with daughter who understands and is agreeable with the plan    Acute cystitis with hematuria on empiric antibiotic therapy cultures are negative awaiting biopsy results. Discussed this over the phone with Dr. Avalos. Will continue bladder irrigation to complete clearance. Renal function okay    Blood clot in bladder          Speedy Bravo MD  09/13/17  8:29 AM           Electronically signed by Speedy Bravo MD at 9/13/2017  8:37 AM        Consult Notes (last 24 hours) (Notes from 9/12/2017 11:31 AM through 9/13/2017 11:31 AM)     No notes of this type exist for this encounter.        Nutrition Notes (last 24 hours) (Notes from 9/12/2017 11:31 AM through 9/13/2017 11:31 AM)     No notes of this type exist for this encounter.           Physical Therapy Notes (last 24 hours) (Notes from 9/12/2017 11:31 AM through 9/13/2017 11:31 AM)      Jolene Garcia, PT at 9/12/2017  5:24 PM  Version 1 of 1         Problem: Patient Care Overview (Adult)  Goal: Plan of Care Review  Outcome: Ongoing (interventions implemented as appropriate)    09/12/17 1720   Coping/Psychosocial Response Interventions   Plan Of Care Reviewed With patient   Outcome Evaluation   Outcome Summary/Follow up Plan Patient participates well in skilled PT and demonstrates deficits in strength, transfers, balance and gait. She is expected to make progress with additional PT services while hospitalized and upon discharge to home with [facility] services.          Problem: Inpatient Physical Therapy  Goal: Bed Mobility Goal LTG- PT  Outcome: Ongoing (interventions  implemented as appropriate)    17 1720   Bed Mobility PT LTG   Bed Mobility PT LTG, Date Established 17   Bed Mobility PT LTG, Time to Achieve 2 wks   Bed Mobility PT LTG, Activity Type all bed mobility   Bed Mobility PT LTG, [place] Level supervision required       Goal: Transfer Training Goal 1 LTG- PT  Outcome: Ongoing (interventions implemented as appropriate)    17 1720   Transfer Training PT LTG   Transfer Training PT LTG, Date Established 17   Transfer Training PT LTG, Time to Achieve 2 wks   Transfer Training PT LTG, Activity Type sit to stand/stand to sit;bed to chair /chair to bed   Transfer Training PT LTG, [place] Level supervision required   Transfer Training PT LTG, Assist Device walker, rolling       Goal: Gait Training Goal LTG- PT  Outcome: Ongoing (interventions implemented as appropriate)    17 1720   Gait Training PT LTG   Gait Training Goal PT LTG, Date Established 17   Gait Training Goal PT LTG, Time to Achieve 2 wks   Gait Training Goal PT LTG, [place] Level contact guard assist   Gait Training Goal PT LTG, Assist Device walker, rolling   Gait Training Goal PT LTG, Distance to Achieve 250              Electronically signed by Jolene Garcia PT at 2017  5:24 PM      Jolene Gacria PT at 2017  5:24 PM  Version 1 of 1         Acute Care - Physical Therapy Initial Evaluation  [facility]     Patient Name: Jose M Green  : 1931  MRN: [id number]  Today's Date: 2017   Onset of Illness/Injury or Date of Surgery Date: 17  Date of Referral to PT: 17  Referring Physician: Carol Ferrell DO      Admit Date: 2017     Visit Dx:    ICD-10-CM ICD-9-CM   1. Blood clot in bladder N32.89 596.7   2. Hydroureteronephrosis N13.30 591   3. Urinary tract infection, site unspecified N39.0    4. Impaired mobility and ADLs Z74.09 799.89   5. Impaired functional mobility, balance, gait, and endurance Z74.09 V49.89     Patient  Active Problem List   Diagnosis   • Chronic lymphocytic leukemia   • Essential hypertension   • Acquired hypothyroidism   • Right knee pain   • Paroxysmal atrial fibrillation   • CAD (coronary artery disease)   • Acute cystitis with hematuria   • Blood clot in bladder     Past Medical History:   Diagnosis Date   • Arthritis    • Chronic lymphocytic leukemia    • CLL (chronic lymphocytic leukemia)    • Disease of thyroid gland    • DVT (deep venous thrombosis)    • Fractures    • Gall stones    • Heart murmur    • Hypertension    • Inguinal hernia    • Kidney infection    • Stomach problems    • UTI (urinary tract infection)      Past Surgical History:   Procedure Laterality Date   • CARDIOVERSION     •  SECTION     • HYSTERECTOMY     • OTHER SURGICAL HISTORY      Both Arm Plates   • VARICOSE VEIN SURGERY            PT ASSESSMENT (last 72 hours)      PT Evaluation       17 1345 17 0954    Rehab Evaluation    Document Type  evaluation  -JR    Subjective Information  agree to therapy;no complaints  -JR    Patient Effort, Rehab Treatment  good  -JR    Symptoms Noted During/After Treatment  fatigue  -JR    General Information    Patient Profile Review  yes  -JR    Onset of Illness/Injury or Date of Surgery Date  17  -JR    Referring Physician  Carol Ferrell, DO  -JR    General Observations  Supine with jasso cath, IV in RUE dtr at bedside  -JR    Pertinent History Of Current Problem  CLL, HTN, DVT, a-fib, hypothyroidism  -JR    Precautions/Limitations  fall precautions  -JR    Prior Level of Function  independent:;all household mobility  -JR    Equipment Currently Used at Home walker, rolling  -JW walker, rolling;wheelchair;commode;shower chair;hospital bed  -JR    Plans/Goals Discussed With  patient and family;agreed upon  -JR    Risks Reviewed  patient and family:;increased discomfort  -JR    Benefits Reviewed  patient and family:;improve function;increase strength;increase balance;increase  independence  -JR    Barriers to Rehab  none identified  -JR    Living Environment    Lives With  alone  -JR    Living Arrangements  apartment   it is within her daughter's home  -JR    Home Accessibility  no concerns  -JR    Clinical Impression    Date of Referral to PT  09/12/17  -JR    PT Diagnosis  weakness, poor balance, gait abnormality  -JR    Prognosis  Fair  -JR    Patient/Family Goals Statement  Ayde wants to go home, but not until she is well  -    Criteria for Skilled Therapeutic Interventions Met  yes;treatment indicated  -JR    Pathology/Pathophysiology Noted (Describe Specifically for Each System)  genitourinary;musculoskeletal;neuromuscular  -JR    Impairments Found (describe specific impairments)  gait, locomotion, and balance;aerobic capacity/endurance;joint integrity and mobility;posture  -JR    Rehab Potential  good, to achieve stated therapy goals  -JR    Pain Assessment    Pain Assessment  No/denies pain  -JR    Cognitive Assessment/Intervention    Current Cognitive/Communication Assessment  functional  -    Orientation Status  oriented x 4  -JR    Follows Commands/Answers Questions  able to follow single-step instructions;needs increased time;needs cueing  -    Personal Safety  mild impairment  -    Personal Safety Interventions  fall prevention program maintained;gait belt  -JR    ROM (Range of Motion)    General ROM  no range of motion deficits identified  -JR    General ROM Detail  Right genu valgus  -    MMT (Manual Muscle Testing)    General MMT Assessment Detail  BLE grossly 3/5  -JR    Bed Mobility, Assessment/Treatment    Bed Mobility, Assistive Device  head of bed elevated;bed rails  -JR    Bed Mobility, Scoot/Bridge, [place]  moderate assist (50% patient effort)  -JR    Bed Mob, Supine to Sit, [place]  moderate assist (50% patient effort)  -JR    Bed Mobility, Safety Issues  impaired trunk control for bed mobility;decreased use of legs for  bridging/pushing;decreased use of arms for pushing/pulling  -    Bed Mobility, Impairments  strength decreased;impaired balance;coordination impaired;postural control impaired  -    Transfer Assessment/Treatment    Transfers, Sit-Stand [place]  minimum assist (75% patient effort)  -    Transfers, Stand-Sit [place]  minimum assist (75% patient effort)  -    Transfers, Sit-Stand-Sit, Assist Device  rolling walker  -    Transfer, Safety Issues  balance decreased during turns;sequencing ability decreased;step length decreased  -    Transfer, Impairments  postural control impaired;motor control impaired;impaired balance;strength decreased  -    Gait Assessment/Treatment    Gait, [place] Level  minimum assist (75% patient effort)  -    Gait, Assistive Device  rolling walker  -    Gait, Distance (Feet)  40  -    Gait, Gait Deviations  toe-to-floor clearance decreased;swing-to-stance ratio decreased;juan carlos decreased;forward flexed posture  -    Gait, Safety Issues  balance decreased during turns;sequencing ability decreased;step length decreased  -    Gait, Impairments  coordination impaired;impaired balance;strength decreased  -    Positioning and Restraints    Pre-Treatment Position  in bed  -    Post Treatment Position  chair  -    In Chair  sitting;call light within reach;encouraged to call for assist;with family/caregiver  -      09/12/17 0951 09/12/17 0155    Rehab Evaluation    Document Type evaluation  -     Subjective Information agree to therapy;no complaints  -     Patient Effort, Rehab Treatment adequate  -     Symptoms Noted During/After Treatment fatigue  -     General Information    Patient Profile Review yes  -     Onset of Illness/Injury or Date of Surgery Date 09/11/17  -     Referring Physician Dr. Ferrell  -     General Observations Pt received supine in bed   -     Pertinent History Of Current Problem h/o CLL, HTN, DVT, Afib,  hypothyroidism  -     Precautions/Limitations fall precautions  -     Prior Level of Function min assist:;bathing;independent:;dressing;all household mobility  -     Equipment Currently Used at Home walker, rolling;wheelchair;commode;shower chair;hospital bed  - walker, rolling  -    Plans/Goals Discussed With patient and family;agreed upon  -     Risks Reviewed patient and family:;increased discomfort  -     Benefits Reviewed patient and family:;improve function;increase independence;increase strength  -     Living Environment    Lives With alone  - alone  -    Living Arrangements apartment   Pt lives in an apartment in her daughter's home  - apartment   connected to daughter's home  -    Home Accessibility no concerns  - no concerns  -    Stair Railings at Home  none  -    Type of Financial/Environmental Concern  none  -    Transportation Available  family or friend will provide  -    Living Environment Comment  none  -    Pain Assessment    Pain Assessment No/denies pain  -     Vision Assessment/Intervention    Visual Impairment WFL  -     Cognitive Assessment/Intervention    Current Cognitive/Communication Assessment functional  -     Orientation Status oriented x 4  -     Follows Commands/Answers Questions able to follow single-step instructions  -     Personal Safety mild impairment  -     Personal Safety Interventions fall prevention program maintained;gait belt;nonskid shoes/slippers when out of bed  -     ROM (Range of Motion)    General ROM no range of motion deficits identified  -     MMT (Manual Muscle Testing)    General MMT Assessment upper extremity strength deficits identified   BUEs grossly 4/5  -     Bed Mobility, Assessment/Treatment    Bed Mob, Supine to Sit, [place] moderate assist (50% patient effort)  -     Transfer Assessment/Treatment    Transfers, Sit-Stand [place] minimum assist (75% patient effort)  -     Transfers,  Stand-Sit [place] minimum assist (75% patient effort)  -     Transfers, Sit-Stand-Sit, Assist Device rolling walker  -     Positioning and Restraints    Pre-Treatment Position in bed  -     Post Treatment Position chair  -     In Chair sitting;call light within reach;encouraged to call for assist;with family/caregiver  -       User Key  (r) = Recorded By, (t) = Taken By, (c) = Cosigned By    Initials Name Provider Type     Renita Mcmanus Occupational Therapist     Jolene Garcia, PT Physical Therapist    WILBER Tesfaye, RN Registered Nurse    STEVE Livingston RN Registered Nurse          Physical Therapy Education     Title: PT OT SLP Therapies (Active)     Topic: Physical Therapy (Active)     Point: Mobility training (Done)    Learning Progress Summary    Learner Readiness Method Response Comment Documented by Status   Patient Acceptance E VU importance of mobility to recovery  09/12/17 1717 Done                      User Key     Initials Effective Dates Name Provider Type Discipline     10/26/16 -  Jolene Garcia, PT Physical Therapist PT                PT Recommendation and Plan  Anticipated Discharge Disposition: home with home health, home with assist  Planned Therapy Interventions: strengthening, balance training, bed mobility training, transfer training, gait training, patient/family education  PT Frequency: daily  Plan of Care Review  Plan Of Care Reviewed With: patient  Outcome Summary/Follow up Plan: Patient participates well in skilled PT and demonstrates deficits in strength, transfers, balance and gait.  She is expected to make progress with additional PT services while hospitalized and upon discharge to home with home [facility] care services.                                 IP PT Goals       09/12/17 1720          Bed Mobility PT LTG    Bed Mobility PT LTG, Date Established 09/12/17  -      Bed Mobility PT LTG, Time to Achieve 2 wks  -      Bed Mobility PT LTG, Activity Type  all bed mobility  -JR      Bed Mobility PT LTG, [place] Level supervision required  -JR      Transfer Training PT LTG    Transfer Training PT LTG, Date Established 09/12/17  -JR      Transfer Training PT LTG, Time to Achieve 2 wks  -JR      Transfer Training PT LTG, Activity Type sit to stand/stand to sit;bed to chair /chair to bed  -JR      Transfer Training PT LTG, [place] Level supervision required  -JR      Transfer Training PT LTG, Assist Device walker, rolling  -JR      Gait Training PT LTG    Gait Training Goal PT LTG, Date Established 09/12/17  -JR      Gait Training Goal PT LTG, Time to Achieve 2 wks  -JR      Gait Training Goal PT LTG, [place] Level contact guard assist  -JR      Gait Training Goal PT LTG, Assist Device walker, rolling  -JR      Gait Training Goal PT LTG, Distance to Achieve 250  -JR        User Key  (r) = Recorded By, (t) = Taken By, (c) = Cosigned By    Initials Name Provider Type    JR Jolene Garcia, PT Physical Therapist                Outcome Measures       09/12/17 0954 09/12/17 0951       How much help from another person do you currently need...    Turning from your back to your side while in flat bed without using bedrails? 3  -JR      Moving from lying on back to sitting on the side of a flat bed without bedrails? 3  -JR      Moving to and from a bed to a chair (including a wheelchair)? 3  -JR      Standing up from a chair using your arms (e.g., wheelchair, bedside chair)? 3  -JR      Climbing 3-5 steps with a railing? 4  -JR      To walk in hospital room? 3  -JR      AM-PAC 6 Clicks Score 19  -JR      How much help from another is currently needed...    Putting on and taking off regular lower body clothing?  3  -AH     Bathing (including washing, rinsing, and drying)  3  -AH     Toileting (which includes using toilet bed pan or urinal)  3  -AH     Putting on and taking off regular upper body clothing  3  -AH     Taking care of personal grooming (such as brushing  teeth)  3  -AH     Eating meals  4  -AH     Score  19  -AH     Functional Assessment    Outcome Measure Options AM-PAC 6 Clicks Basic Mobility (PT)  - AM-PAC 6 Clicks Daily Activity (OT)  -       User Key  (r) = Recorded By, (t) = Taken By, (c) = Cosigned By    Initials Name Provider Type     Renita Mcmanus Occupational Therapist    JR Jolene Garcia PT Physical Therapist           Time Calculation:         PT Charges       09/12/17 5548          Time Calculation    Start Time 0954  -JR      PT Received On 09/12/17  -      PT Goal Re-Cert Due Date 09/22/17  -        User Key  (r) = Recorded By, (t) = Taken By, (c) = Cosigned By    Initials Name Provider Type    JR Jolene Garcia PT Physical Therapist          Therapy Charges for Today     Code Description Service Date Service Provider Modifiers Qty    58082275305 HC PT EVAL MOD COMPLEXITY 4 9/12/2017 Jolene Garcia PT GP 1          PT G-Codes  Outcome Measure Options: AM-PAC 6 Clicks Basic Mobility (PT)      Jolene Garcia PT  9/12/2017            Electronically signed by Jolene Garcia PT at 9/12/2017  5:24 PM           Occupational Therapy Notes (last 24 hours) (Notes from 9/12/2017 11:31 AM through 9/13/2017 11:31 AM)      Renita Mcmanus at 9/12/2017  3:14 PM  Version 1 of 1         Problem: Patient Care Overview (Adult)  Goal: Plan of Care Review  Outcome: Ongoing (interventions implemented as appropriate)    09/12/17 1512   Coping/Psychosocial Response Interventions   Plan Of Care Reviewed With patient;daughter   Patient Care Overview   Progress no change   Outcome Evaluation   Outcome Summary/Follow up Plan Pt seen for OT evaluation today. Pt presents with decline in ADL independence and is expected to benefit from skilled OT to improve her independence with self care, transfers, and functional mobility tasks.         Problem: Inpatient Occupational Therapy  Goal: Bed Mobility Goal LTG- OT  Outcome: Ongoing (interventions implemented as appropriate)    09/12/17  1512   Bed Mobility OT LTG   Bed Mobility OT LTG, Date Established 17   Bed Mobility OT LTG, Time to Achieve by discharge   Bed Mobility OT LTG, Activity Type supine to sit/sit to supine   Bed Mobility OT LTG, [place] Level contact guard assist   Bed Mobility OT LTG, Assist Device bed rails   Bed Mobility OT LTG, Outcome goal ongoing       Goal: Strength Goal LTG- OT  Outcome: Ongoing (interventions implemented as appropriate)    17 1512   Strength OT LTG   Strength Goal OT LTG, Date Established 17   Strength Goal OT LTG, Time to Achieve by discharge   Strength Goal OT LTG, Functional Goal Pt will participate in BUE strengthening ex to improve her independence with ADL tasks.   Strength Goal OT LTG, Outcome goal ongoing       Goal: LB Dressing Goal LTG- OT  Outcome: Ongoing (interventions implemented as appropriate)    17 1512   LB Dressing OT LTG   LB Dressing Goal OT LTG, Date Established 17   LB Dressing Goal OT LTG, Time to Achieve by discharge   LB Dressing Goal OT LTG, [place] Level contact guard assist   LB Dressing Goal OT LTG, Outcome goal ongoing       Goal: Functional Mobility Goal LTG- OT  Outcome: Ongoing (interventions implemented as appropriate)    17 1512   Functional Mobility OT LTG   Functional Mobility Goal OT LTG, Date Established 17   Functional Mobility Goal OT LTG, Time to Achieve by discharge   Functional Mobility Goal OT LTG, [place] Level contact guard   Functional Mobility Goal OT LTG, Assist Device rolling walker   Functional Mobility Goal OT LTG, Distance to Achieve in hallway   Functional Mobility Goal OT LTG, Outcome goal ongoing              Electronically signed by Renita Mcmanus at 2017  3:14 PM      Renita Mcmanus at 2017  3:16 PM  Version 1 of 1         Acute Care - Occupational Therapy Initial Evaluation  [facility]     Patient Name: Jose M Green  : 1931  MRN: [id number]  Today's Date: 2017  Onset  of Illness/Injury or Date of Surgery Date: 17  Date of Referral to OT: 17  Referring Physician: Dr. Ferrell    Admit Date: 2017       ICD-10-CM ICD-9-CM   1. Blood clot in bladder N32.89 596.7   2. Hydroureteronephrosis N13.30 591   3. Urinary tract infection, site unspecified N39.0    4. Impaired mobility and ADLs Z74.09 799.89     Patient Active Problem List   Diagnosis   • Chronic lymphocytic leukemia   • Essential hypertension   • Acquired hypothyroidism   • Right knee pain   • Paroxysmal atrial fibrillation   • CAD (coronary artery disease)   • Acute cystitis with hematuria   • Blood clot in bladder     Past Medical History:   Diagnosis Date   • Arthritis    • Chronic lymphocytic leukemia    • CLL (chronic lymphocytic leukemia)    • Disease of thyroid gland    • DVT (deep venous thrombosis)    • Fractures    • Gall stones    • Heart murmur    • Hypertension    • Inguinal hernia    • Kidney infection    • Stomach problems    • UTI (urinary tract infection)      Past Surgical History:   Procedure Laterality Date   • CARDIOVERSION     •  SECTION     • HYSTERECTOMY     • OTHER SURGICAL HISTORY      Both Arm Plates   • VARICOSE VEIN SURGERY            OT ASSESSMENT FLOWSHEET (last 72 hours)      OT Evaluation       17 1345 17 0951 17 0155          Rehab Evaluation    Document Type  evaluation  -       Subjective Information  agree to therapy;no complaints  -       Patient Effort, Rehab Treatment  adequate  -       Symptoms Noted During/After Treatment  fatigue  -       General Information    Patient Profile Review  yes  -AH       Onset of Illness/Injury or Date of Surgery Date  17  -       Referring Physician  Dr. Ferrell  -       General Observations  Pt received supine in bed   -       Pertinent History Of Current Problem  h/o CLL, HTN, DVT, Afib, hypothyroidism  -       Precautions/Limitations  fall precautions  -       Prior Level of Function   min assist:;bathing;independent:;dressing;all household mobility  -       Equipment Currently Used at Home walker, rolling  - walker, rolling;wheelchair;commode;shower chair;hospital bed  - walker, rolling  -      Plans/Goals Discussed With  patient and family;agreed upon  -       Risks Reviewed  patient and family:;increased discomfort  -       Benefits Reviewed  patient and family:;improve function;increase independence;increase strength  -       Living Environment    Lives With  alone  - alone  -      Living Arrangements  apartment   Pt lives in an apartment in her daughter's home  - apartment   connected to daughter's home  -      Home Accessibility  no concerns  - no concerns  -      Stair Railings at Home   none  -      Type of Financial/Environmental Concern   none  -      Transportation Available   family or friend will provide  -      Living Environment Comment   none  -      Clinical Impression    Date of Referral to OT  09/12/17  -       OT Diagnosis  ADL decline  -       Patient/Family Goals Statement  Pt wants to wants to return home with home health  -       Criteria for Skilled Therapeutic Interventions Met  yes;treatment indicated  -       Rehab Potential  good, to achieve stated therapy goals  -       Therapy Frequency  3-5 times/wk  -       Anticipated Discharge Disposition  home with home health  -       Functional Level Prior    Ambulation  1-->assistive equipment  - 1-->assistive equipment  -RC      Transferring  1-->assistive equipment  - 1-->assistive equipment  -RC      Toileting  0-->independent  - 1-->assistive equipment  -RC      Bathing  2-->assistive person  - 1-->assistive equipment  -RC      Dressing  0-->independent  - 0-->independent  -RC      Eating  0-->independent  -AH 0-->independent  -RC      Communication  0-->understands/communicates without difficulty  - 0-->understands/communicates without difficulty  -       Swallowing  0-->swallows foods/liquids without difficulty  - 0-->swallows foods/liquids without difficulty  -RC      Prior Functional Level Comment   none  -      Pain Assessment    Pain Assessment  No/denies pain  -       Vision Assessment/Intervention    Visual Impairment  WFL  -       Cognitive Assessment/Intervention    Current Cognitive/Communication Assessment  functional  -       Orientation Status  oriented x 4  -       Follows Commands/Answers Questions  able to follow single-step instructions  -       Personal Safety  mild impairment  -       Personal Safety Interventions  fall prevention program maintained;gait belt;nonskid shoes/slippers when out of bed  -       ROM (Range of Motion)    General ROM  no range of motion deficits identified  -       MMT (Manual Muscle Testing)    General MMT Assessment  upper extremity strength deficits identified   BUEs grossly 4/5  -       Bed Mobility, Assessment/Treatment    Bed Mob, Supine to Sit, [place]  moderate assist (50% patient effort)  -       Transfer Assessment/Treatment    Transfers, Sit-Stand [place]  minimum assist (75% patient effort)  -       Transfers, Stand-Sit [place]  minimum assist (75% patient effort)  -       Transfers, Sit-Stand-Sit, Assist Device  rolling walker  -       Functional Mobility    Functional Mobility- [place] Level  minimum assist (75% patient effort)  -       Functional Mobility- Device  rolling walker  -       Functional Mobility-Distance (Feet)  40  -       Upper Body Bathing Assessment/Training    UB Bathing Assess/Train, [place] Level  minimum assist (75% patient effort)  -       Lower Body Bathing Assessment/Training    LB Bathing Assess/Train, [place] Level  minimum assist (75% patient effort)  -       Upper Body Dressing Assessment/Training    UB Dressing Assess/Train, [place]  contact guard assist  -       Lower Body Dressing Assessment/Training    LB  Dressing Assess/Train, [place]  minimum assist (75% patient effort)  -       Toileting Assessment/Training    Toileting Assess/Train, [place] Level  minimum assist (75% patient effort)  -       Grooming Assessment/Training    Grooming Assess/Train, [place] Level  minimum assist (75% patient effort)  -       General Therapy Interventions    Planned Therapy Interventions  adaptive equipment training;ADL retraining;bed mobility training;strengthening;transfer training  -       Positioning and Restraints    Pre-Treatment Position  in bed  -       Post Treatment Position  chair  -       In Chair  sitting;call light within reach;encouraged to call for assist;with family/caregiver  -         User Key  (r) = Recorded By, (t) = Taken By, (c) = Cosigned By    Initials Name Effective Dates     Renita Mcmanus 10/26/16 -     WILBER Tesfaye RN 11/07/16 -     STEVE Livingston RN 04/07/17 -            Occupational Therapy Education     Title: PT OT SLP Therapies (Active)     Topic: Occupational Therapy (Active)     Point: ADL training (Done)    Description: Instruct learner(s) on proper safety adaptation and remediation techniques during self care or transfers.   Instruct in proper use of assistive devices.    Learning Progress Summary    Learner Readiness Method Response Comment Documented by Status   Patient Acceptance E VU Role of OT/POC  09/12/17 1511 Done   Family Acceptance E VU Role of OT/POC  09/12/17 1511 Done                      User Key     Initials Effective Dates Name Provider Type Discipline     10/26/16 -  Renita Mcmanus Occupational Therapist OT                  OT Recommendation and Plan  Anticipated Discharge Disposition: home with home health  Planned Therapy Interventions: adaptive equipment training, ADL retraining, bed mobility training, strengthening, transfer training  Therapy Frequency: 3-5 times/wk  Plan of Care Review  Plan Of Care Reviewed With: patient,  daughter  Progress: no change  Outcome Summary/Follow up Plan: Pt seen for OT evaluation today.  Pt presents with decline in ADL independence and is expected to benefit from skilled OT to improve her independence with self care, transfers, and functional mobility tasks.          OT Goals       09/12/17 1512          Bed Mobility OT LTG    Bed Mobility OT LTG, Date Established 09/12/17  -      Bed Mobility OT LTG, Time to Achieve by discharge  -      Bed Mobility OT LTG, Activity Type supine to sit/sit to supine  -      Bed Mobility OT LTG, [place] Level contact guard assist  -      Bed Mobility OT LTG, Assist Device bed rails  -      Bed Mobility OT LTG, Outcome goal ongoing  -      Strength OT LTG    Strength Goal OT LTG, Date Established 09/12/17  -      Strength Goal OT LTG, Time to Achieve by discharge  -      Strength Goal OT LTG, Functional Goal Pt will participate in BUE strengthening ex to improve her independence with ADL tasks.  -      Strength Goal OT LTG, Outcome goal ongoing  -      LB Dressing OT LTG    LB Dressing Goal OT LTG, Date Established 09/12/17  -      LB Dressing Goal OT LTG, Time to Achieve by discharge  -      LB Dressing Goal OT LTG, [place] Level contact guard assist  -      LB Dressing Goal OT LTG, Outcome goal ongoing  -      Functional Mobility OT LTG    Functional Mobility Goal OT LTG, Date Established 09/12/17  -      Functional Mobility Goal OT LTG, Time to Achieve by discharge  -      Functional Mobility Goal OT LTG, [place] Level contact guard  -      Functional Mobility Goal OT LTG, Assist Device rolling walker  -      Functional Mobility Goal OT LTG, Distance to Achieve in hallway  -      Functional Mobility Goal OT LTG, Outcome goal ongoing  -        User Key  (r) = Recorded By, (t) = Taken By, (c) = Cosigned By    Initials Name Provider Type    NICA Mcmanus Occupational Therapist                Outcome Measures        09/12/17 0951          How much help from another is currently needed...    Putting on and taking off regular lower body clothing? 3  -AH      Bathing (including washing, rinsing, and drying) 3  -AH      Toileting (which includes using toilet bed pan or urinal) 3  -AH      Putting on and taking off regular upper body clothing 3  -AH      Taking care of personal grooming (such as brushing teeth) 3  -AH      Eating meals 4  -AH      Score 19  -AH      Functional Assessment    Outcome Measure Options AM-PAC 6 Clicks Daily Activity (OT)  -        User Key  (r) = Recorded By, (t) = Taken By, (c) = Cosigned By    Initials Name Provider Type     Renita Mcmanus Occupational Therapist          Time Calculation:   OT Start Time: 0951    Therapy Charges for Today     Code Description Service Date Service Provider Modifiers Qty    24930639324 HC OT EVAL LOW COMPLEXITY 4 9/12/2017 Renita Mcmanus GO 1               Renita Mcmanus  9/12/2017     Electronically signed by Renita Mcmanus at 9/12/2017  3:16 PM        Speech Language Pathology Notes (last 24 hours) (Notes from 9/12/2017 11:31 AM through 9/13/2017 11:31 AM)     No notes of this type exist for this encounter.        Respiratory Therapy Notes (last 24 hours) (Notes from 9/12/2017 11:31 AM through 9/13/2017 11:31 AM)     No notes of this type exist for this encounter.

## 2017-09-13 NOTE — PROGRESS NOTES
Continued Stay Note   Pham     Patient Name: Jose M Green  MRN: [id number]  Today's Date: 9/13/2017    Admit Date: 9/11/2017          Discharge Plan       09/13/17 1430    Case Management/Social Work Plan    Plan SW asked to look at POA paperwork. Family ha swritten a letter , signed and notarizing it to defer decisions on patient to a second daughter. Discussed with Rudy Bradley and will ask nurse to conact her to confirm patient's ability to give consent.       09/13/17 1133    Final Note    Final Note Spoke with daughter Toya and she discussed facilities with patient over the phone and they would like [facility].  Call to Urmila and advised of referral. and she advises she does have rehab beds.   Faxed demos and clinicals.              Discharge Codes     None            Humaira Crane  Notified Fabiola of questions regarding patient's POA paperwork and provided Shanell Bradley phone number.

## 2017-09-13 NOTE — PLAN OF CARE
Problem: Patient Care Overview (Adult)  Goal: Plan of Care Review  Outcome: Ongoing (interventions implemented as appropriate)    09/13/17 0429   Coping/Psychosocial Response Interventions   Plan Of Care Reviewed With patient   Patient Care Overview   Progress improving   Outcome Evaluation   Outcome Summary/Follow up Plan F/C patent with CBI, urine light red.          Problem: Urine Elimination, Impaired (Adult)  Goal: Effective Urinary Elimination  Outcome: Ongoing (interventions implemented as appropriate)    09/13/17 0429   Urine Elimination, Impaired (Adult)   Effective Urinary Elimination making progress toward outcome         Problem: Wound, Traumatic, Nonburn (Adult)  Goal: Signs and Symptoms of Listed Potential Problems Will be Absent or Manageable (Wound, Traumatic, Nonburn)  Outcome: Outcome(s) achieved Date Met:  09/13/17 09/13/17 0429   Wound, Traumatic, Nonburn   Problems Assessed (Wound) all   Problems Present (Wound) none         Problem: Perioperative Period (Adult)  Goal: Signs and Symptoms of Listed Potential Problems Will be Absent or Manageable (Perioperative Period)  Outcome: Outcome(s) achieved Date Met:  09/13/17 09/13/17 0429   Perioperative Period   Problems Assessed (Perioperative Period) all   Problems Present (Perioperative Period) none

## 2017-09-13 NOTE — PROGRESS NOTES
Continued Stay Note   Ankit     Patient Name: Jose M Green  MRN: [id number]  Today's Date: 9/13/2017    Admit Date: 9/11/2017          Discharge Plan       09/13/17 1133    Final Note    Final Note Spoke with daughter Toya and she discussed facilities with patient over the phone and they would like [facility].  Call to [facility] and advised of referral. and she advises she does have rehab beds.   Faxed demos and clinicals.      09/13/17 0903    Case Management/Social Work Plan    Plan Spoke with patient, she is alone.  States her son in law Ant should be here later today, he is her POA.  She lives in an apartment connected to her daughter and son-inl law's home.  She has a caregiver 4x per week during the day who does her cooking and cleaning and assists with ADL's.  She also has home health who she belives is [facility] and they set her up with 3 months of lifeline .  Patient's apartment is handicap accessible and she uses a walker.  Her son in law also brings her dinner in the evenings if her caretaker has made something.  Spoke with patient about Dr Bravo receommending rehab and she states she would be in agreement for a few weeks but is not sure which facility.  Asked that CM speak with her son in law regartding this.  Call to Ant son in law and no answer and unable to leave message.  Informed nurse Fabiola to jaimee SCHREIBER when he arrives to hospital.    Patient/Family In Agreement With Plan yes              Discharge Codes     None            Becka Harper

## 2017-09-13 NOTE — SIGNIFICANT NOTE
"   09/13/17 1345   Rehab Treatment   Discipline occupational therapist   Treatment Not Performed patient/family declined treatment  (Pt states,\"Im tired\".)     "

## 2017-09-14 LAB
ANION GAP SERPL CALCULATED.3IONS-SCNC: 11 MMOL/L
BUN BLD-MCNC: 15 MG/DL (ref 7–20)
BUN/CREAT SERPL: 18.8 (ref 7.1–23.5)
CALCIUM SPEC-SCNC: 8.6 MG/DL (ref 8.4–10.2)
CHLORIDE SERPL-SCNC: 101 MMOL/L (ref 98–107)
CO2 SERPL-SCNC: 22 MMOL/L (ref 26–30)
CREAT BLD-MCNC: 0.8 MG/DL (ref 0.6–1.3)
DEPRECATED RDW RBC AUTO: 46.7 FL (ref 37–54)
ERYTHROCYTE [DISTWIDTH] IN BLOOD BY AUTOMATED COUNT: 15.4 % (ref 11.5–14.5)
GFR SERPL CREATININE-BSD FRML MDRD: 68 ML/MIN/1.73
GLUCOSE BLD-MCNC: 93 MG/DL (ref 74–98)
HCT VFR BLD AUTO: 32.3 % (ref 37–47)
HGB BLD-MCNC: 10.1 G/DL (ref 12–16)
MCH RBC QN AUTO: 26.4 PG (ref 27–31)
MCHC RBC AUTO-ENTMCNC: 31.3 G/DL (ref 30–37)
MCV RBC AUTO: 84.3 FL (ref 81–99)
PLATELET # BLD AUTO: 105 10*3/MM3 (ref 130–400)
PMV BLD AUTO: 11.2 FL (ref 6–12)
POTASSIUM BLD-SCNC: 3 MMOL/L (ref 3.5–5.1)
RBC # BLD AUTO: 3.83 10*6/MM3 (ref 4.2–5.4)
SODIUM BLD-SCNC: 131 MMOL/L (ref 137–145)
WBC NRBC COR # BLD: 9.87 10*3/MM3 (ref 4.8–10.8)

## 2017-09-14 PROCEDURE — 85027 COMPLETE CBC AUTOMATED: CPT | Performed by: INTERNAL MEDICINE

## 2017-09-14 PROCEDURE — 25010000002 CEFTRIAXONE: Performed by: INTERNAL MEDICINE

## 2017-09-14 PROCEDURE — 99233 SBSQ HOSP IP/OBS HIGH 50: CPT | Performed by: INTERNAL MEDICINE

## 2017-09-14 PROCEDURE — 80048 BASIC METABOLIC PNL TOTAL CA: CPT | Performed by: INTERNAL MEDICINE

## 2017-09-14 RX ADMIN — LATANOPROST 1 DROP: 50 SOLUTION OPHTHALMIC at 22:09

## 2017-09-14 RX ADMIN — LEVOTHYROXINE SODIUM 100 MCG: 100 TABLET ORAL at 06:17

## 2017-09-14 RX ADMIN — ATORVASTATIN CALCIUM 40 MG: 40 TABLET, FILM COATED ORAL at 08:24

## 2017-09-14 RX ADMIN — MIRTAZAPINE 30 MG: 15 TABLET, FILM COATED ORAL at 21:57

## 2017-09-14 RX ADMIN — PANTOPRAZOLE SODIUM 40 MG: 40 TABLET, DELAYED RELEASE ORAL at 06:17

## 2017-09-14 RX ADMIN — TRAZODONE HYDROCHLORIDE 50 MG: 50 TABLET ORAL at 21:57

## 2017-09-14 RX ADMIN — METOPROLOL TARTRATE 50 MG: 50 TABLET ORAL at 08:23

## 2017-09-14 RX ADMIN — AMLODIPINE BESYLATE 2.5 MG: 5 TABLET ORAL at 08:24

## 2017-09-14 RX ADMIN — METOPROLOL TARTRATE 50 MG: 50 TABLET ORAL at 21:57

## 2017-09-14 RX ADMIN — CEFTRIAXONE 1 G: 1 INJECTION, POWDER, FOR SOLUTION INTRAMUSCULAR; INTRAVENOUS at 01:18

## 2017-09-14 RX ADMIN — Medication 1 EACH: at 01:18

## 2017-09-14 NOTE — PROGRESS NOTES
Continued Stay Note   Ankit     Patient Name: Jose M Green  MRN: [id number]  Today's Date: 9/14/2017    Admit Date: 9/11/2017          Discharge Plan       09/14/17 1713    Case Management/Social Work Plan    Plan [facility]    Additional Comments Spoke with pt's son-in-law (Ant) regarding DCP.  He states he and his wife are POA.  However, his wife is not well, so he reports that he has notarized a letter giving Toya (pt's other daughter) permission to assist with decision for pt.  Ant was given an update regarding [facility].  He is in agreement with plan for pt to transfer there tomorrow.  Transportation disussed and he will try to transport pt by private vehicle.   Called Urmila at [facility] and update given.  CM will continue to follow and assist with discharge as needed.              Discharge Codes     None        Expected Discharge Date and Time     Expected Discharge Date Expected Discharge Time    Sep 15, 2017             Shannon Hanson

## 2017-09-14 NOTE — DISCHARGE SUMMARY
[facility]   DISCHARGE SUMMARY      Name:  Jose M Green   Age:  86 y.o.  Sex:  female  :  1931  MRN:  [id number]   Visit Number:  [id number]  Primary Care Physician:  Speedy Bravo MD  Date of Discharge:  2017  Admission Date:  2017      Discharge Diagnosis:     Gross hematuria, recurring cystitis, atrial fibrillation, hypertension, hypothyroidism    Active Problems:    Chronic lymphocytic leukemia remains stable noted to be anemic but that was secondary to the hematuria. Given a unit of blood and an iron infusion hemoglobin better today. Follow CBC in rehabilitation facility within a week.    Essential hypertension stable however initially due to dehydration is inhibitors were discontinued along with discontinuation of ARB with HCTZ    Paroxysmal atrial fibrillation rate control okay with beta blockers. However a decision was made to discontinue anticoagulant therapy in view of her significant bleed discussed this with patient, daughter and son who understand and agree the risks and benefits of this. Would stay off anticoagulant therapy for at least a month if not more    CAD (coronary artery disease) angina free on beta blockers and statins however aspirin and Plavix discontinued because of significant bleeding. Once again patient and son, daughter made aware of risk and benefits.    Acute cystitis with hematuria urology consult obtained. Initial blood culture and urine culture without any growth. Patient had come in on oral antibiotic therapy. Underwent cystoscopy blood clots noted biopsy has been obtained results are still pending.Significant history of tobacco use in the past. CT of abdomen and pelvis with evidence of urine outflow obstruction. Renal function back to normal. Urine cultures from the past reviewed these showed various organisms at different times namely Klebsiella, Enterobacter.  There were sensitive to cephalosporins hence we will continue  that again    Blood clot in bladder. Underwent intense bladder irrigation now with significant delay clear urine in bag. This is being discontinued patient to be voiding on herself after which we will check a postvoid residual      Presenting Problem/History of Present Illness:    Blood clot in bladder [N32.89]  Blood clot in bladder [N32.89]     Consults:     Consults     Date and Time Order Name Status Description    9/14/2017 0856 Inpatient Consult to Urology      9/11/2017 2344 Urology (on-call MD unless specified) Completed           Procedures Performed:    Procedure(s):  CYSTOSCOPY CYSTOGRAM, EVACUATION OF BLADDER CLOTS  CYSTOSCOPY BLADDER BIOPSY         History of presenting illness:        Hospital Course:    See details above  Review of Systems:  Review of Systems   Constitutional: Negative.  Negative for activity change, appetite change, fatigue and fever.   HENT: Negative for congestion, ear discharge, ear pain and trouble swallowing.    Eyes: Negative for photophobia and visual disturbance.   Respiratory: Negative for cough and shortness of breath.    Cardiovascular: Negative for chest pain and palpitations.   Gastrointestinal: Negative for abdominal distention, abdominal pain, constipation, diarrhea, nausea and vomiting.   Endocrine: Negative.    Genitourinary: Positive for difficulty urinating, frequency and hematuria. Negative for dysuria and urgency.   Musculoskeletal: Positive for arthralgias and back pain. Negative for joint swelling and myalgias.   Skin: Negative for color change and rash.   Allergic/Immunologic: Negative.    Neurological: Negative for dizziness, weakness, light-headedness and headaches.   Hematological: Negative for adenopathy. Does not bruise/bleed easily.   Psychiatric/Behavioral: Positive for decreased concentration and sleep disturbance. Negative for agitation, confusion and dysphoric mood. The patient is nervous/anxious.         Vital Signs:    Temp:  [98.1 °F (36.7  °C)-99.8 °F (37.7 °C)] 99.4 °F (37.4 °C)  Heart Rate:  [] 84  Resp:  [16-20] 16  BP: ()/(51-84) 158/84    Physical Exam:  Physical Exam   Constitutional: She appears well-developed and well-nourished. No distress.   HENT:   Nose: Nose normal.   Mouth/Throat: Oropharynx is clear and moist.   Eyes: Conjunctivae and EOM are normal. No scleral icterus.   Neck: No tracheal deviation present. No thyromegaly present.   Cardiovascular: Normal rate and regular rhythm.  Exam reveals no friction rub.    No murmur heard.  Pulmonary/Chest: No respiratory distress. She has no wheezes. She has no rales.   Abdominal: Soft. She exhibits no distension and no mass. There is no tenderness. There is no guarding.   Musculoskeletal: Normal range of motion. She exhibits tenderness and deformity.   Lymphadenopathy:     She has no cervical adenopathy.   Neurological: She is alert. She has normal reflexes. No cranial nerve deficit. Coordination normal.   Skin: Skin is warm and dry. No rash noted. No erythema.   Psychiatric: She has a normal mood and affect. Her behavior is normal. Judgment and thought content normal.       Pertinent Lab Results:     Lab Results (last 72 hours)     Procedure Component Value Units Date/Time    CBC & Differential [[id number]] Collected:  09/11/17 2044    Specimen:  Blood Updated:  09/11/17 2055    Narrative:       The following orders were created for panel order CBC & Differential.  Procedure                               Abnormality         Status                     ---------                               -----------         ------                     CBC Auto Differential[[id number]]        Abnormal            Final result                 Please view results for these tests on the individual orders.    CBC Auto Differential [[id number]]  (Abnormal) Collected:  09/11/17 2044    Specimen:  Blood Updated:  09/11/17 2055     WBC 18.17 (H) 10*3/mm3      RBC 3.86 (L) 10*6/mm3      Hemoglobin 10.4 (L) g/dL       Hematocrit 32.9 (L) %      MCV 85.2 fL      MCH 26.9 (L) pg      MCHC 31.6 g/dL      RDW 15.5 (H) %      RDW-SD 47.7 fl      MPV 11.0 fL      Platelets 165 10*3/mm3      Neutrophil % 53.8 %      Lymphocyte % 38.1 %      Monocyte % 7.2 %      Eosinophil % 0.0 %      Basophil % 0.3 %      Immature Grans % 0.6 %      Neutrophils, Absolute 9.78 (H) 10*3/mm3      Lymphocytes, Absolute 6.92 (H) 10*3/mm3      Monocytes, Absolute 1.30 (H) 10*3/mm3      Eosinophils, Absolute 0.00 10*3/mm3      Basophils, Absolute 0.06 10*3/mm3      Immature Grans, Absolute 0.11 (H) 10*3/mm3      nRBC 0.0 /100 WBC     Comprehensive Metabolic Panel [[id number]]  (Abnormal) Collected:  09/11/17 2044    Specimen:  Blood Updated:  09/11/17 2119     Glucose 129 (H) mg/dL      BUN 15 mg/dL       Specimen hemolyzed. Results may be affected.        Creatinine 0.80 mg/dL      Sodium 129 (L) mmol/L      Potassium 4.2 mmol/L       Specimen hemolyzed.  Results may be affected.        Chloride 94 (L) mmol/L      CO2 25.0 (L) mmol/L      Calcium 9.5 mg/dL      Total Protein 6.9 g/dL      Albumin 4.10 g/dL      ALT (SGPT) 30 U/L       Specimen hemolyzed.  Results may be affected.        AST (SGOT) 42 U/L       Specimen hemolyzed.  Results may be affected.        Alkaline Phosphatase 142 (H) U/L       Specimen hemolyzed. Results may be affected.        Total Bilirubin 1.1 mg/dL      eGFR Non African Amer 68 mL/min/1.73      Globulin 2.8 gm/dL      A/G Ratio 1.5 g/dL      BUN/Creatinine Ratio 18.8     Anion Gap 14.2 mmol/L     Narrative:       The MDRD GFR formula is only valid for adults with stable renal function between ages 18 and 70.    Urinalysis With / Culture If Indicated [[id number]]  (Abnormal) Collected:  09/11/17 2044    Specimen:  Urine from Urine, Catheter Updated:  09/11/17 2130     Color, UA Red (A)     Appearance, UA Cloudy (A)     pH, UA >=9.0 (H)     Specific Gravity, UA 1.010     Glucose,  mg/dL (2+) (A)     Ketones, UA >=160  mg/dL (4+) (A)     Bilirubin, UA Large (3+) (A)     Blood, UA Large (3+) (A)     Protein, UA >=300 mg/dL (3+) (A)     Leuk Esterase, UA Large (3+) (A)     Nitrite, UA Positive (A)     Urobilinogen, UA >=8.0 E.U./dL (A)    Urinalysis, Microscopic Only [[id number]]  (Abnormal) Collected:  09/11/17 2044    Specimen:  Urine from Urine, Catheter Updated:  09/11/17 2130     RBC, UA Too Numerous to Count (A) /HPF      WBC, UA  /HPF      Unable to determine due to loaded field (A)     Bacteria, UA  /HPF      Unable to determine due to loaded field (A)     Squamous Epithelial Cells, UA  /HPF      Unable to determine due to loaded field (A)     Hyaline Casts, UA  /LPF      Unable to determine due to loaded field     Methodology Manual Light Microscopy    Lactic Acid, Plasma [[id number]]  (Normal) Collected:  09/11/17 2347    Specimen:  Blood Updated:  09/12/17 0009     Lactate 0.9 mmol/L     CBC Auto Differential [[id number]]  (Abnormal) Collected:  09/12/17 0529    Specimen:  Blood Updated:  09/12/17 0619     WBC 11.74 (H) 10*3/mm3      RBC 3.18 (L) 10*6/mm3      Hemoglobin 8.6 (L) g/dL      Hematocrit 27.4 (L) %      MCV 86.2 fL      MCH 27.0 pg      MCHC 31.4 g/dL      RDW 15.5 (H) %      RDW-SD 49.1 fl      MPV 11.5 fL      Platelets 103 (L) 10*3/mm3      Neutrophil % 57.9 %      Lymphocyte % 33.6 %      Monocyte % 7.4 %      Eosinophil % 0.0 %      Basophil % 0.3 %      Immature Grans % 0.8 (H) %      Neutrophils, Absolute 6.79 10*3/mm3      Lymphocytes, Absolute 3.95 (H) 10*3/mm3      Monocytes, Absolute 0.87 10*3/mm3      Eosinophils, Absolute 0.00 10*3/mm3      Basophils, Absolute 0.04 10*3/mm3      Immature Grans, Absolute 0.09 (H) 10*3/mm3      nRBC 0.0 /100 WBC     Basic Metabolic Panel [[id number]]  (Abnormal) Collected:  09/12/17 0529    Specimen:  Blood Updated:  09/12/17 0620     Glucose 115 (H) mg/dL      BUN 14 mg/dL      Creatinine 0.70 mg/dL      Sodium 130 (L) mmol/L      Potassium 3.5 mmol/L      Chloride 99  mmol/L      CO2 23.0 (L) mmol/L      Calcium 9.0 mg/dL      eGFR Non African Amer 79 mL/min/1.73      BUN/Creatinine Ratio 20.0     Anion Gap 11.5 mmol/L     Narrative:       The MDRD GFR formula is only valid for adults with stable renal function between ages 18 and 70.    Urine Culture [[id number]]  (Normal) Collected:  09/11/17 2044    Specimen:  Urine from Urine, Catheter Updated:  09/13/17 0536     Urine Culture No growth    CBC (No Diff) [[id number]]  (Abnormal) Collected:  09/13/17 0554    Specimen:  Blood Updated:  09/13/17 0631     WBC 9.94 10*3/mm3      RBC 2.81 (L) 10*6/mm3      Hemoglobin 7.5 (C) g/dL      Hematocrit 24.4 (L) %      MCV 86.8 fL      MCH 26.7 (L) pg      MCHC 30.7 g/dL      RDW 15.4 (H) %      RDW-SD 49.1 fl      MPV 11.4 fL      Platelets 83 (L) 10*3/mm3     Comprehensive Metabolic Panel [[id number]]  (Abnormal) Collected:  09/13/17 0554    Specimen:  Blood Updated:  09/13/17 0639     Glucose 86 mg/dL      BUN 20 mg/dL      Creatinine 0.90 mg/dL      Sodium 133 (L) mmol/L      Potassium 3.3 (L) mmol/L      Chloride 101 mmol/L      CO2 24.0 (L) mmol/L      Calcium 8.5 mg/dL      Total Protein 4.8 (L) g/dL      Albumin 2.70 (L) g/dL      ALT (SGPT) 32 U/L      AST (SGOT) 20 U/L      Alkaline Phosphatase 95 U/L      Total Bilirubin 0.3 mg/dL      eGFR Non African Amer 59 (L) mL/min/1.73      Globulin 2.1 gm/dL      A/G Ratio 1.3 g/dL      BUN/Creatinine Ratio 22.2     Anion Gap 11.3 mmol/L     Narrative:       The MDRD GFR formula is only valid for adults with stable renal function between ages 18 and 70.    Tissue Pathology Exam [[id number]] Collected:  09/12/17 1511    Specimen:  Tissue from Urinary Bladder Updated:  09/13/17 0950    Blood Culture [[id number]]  (Normal) Collected:  09/11/17 2341    Specimen:  Blood from Arm, Left Updated:  09/14/17 0003     Blood Culture No growth at 2 days    Blood Culture [[id number]]  (Normal) Collected:  09/11/17 2341    Specimen:  Blood from Arm, Right  Updated:  09/14/17 0003     Blood Culture No growth at 2 days    CBC (No Diff) [[id number]]  (Abnormal) Collected:  09/14/17 0518    Specimen:  Blood Updated:  09/14/17 0557     WBC 9.87 10*3/mm3      RBC 3.83 (L) 10*6/mm3      Hemoglobin 10.1 (L) g/dL      Hematocrit 32.3 (L) %      MCV 84.3 fL      MCH 26.4 (L) pg      MCHC 31.3 g/dL      RDW 15.4 (H) %      RDW-SD 46.7 fl      MPV 11.2 fL      Platelets 105 (L) 10*3/mm3     Basic Metabolic Panel [[id number]]  (Abnormal) Collected:  09/14/17 0518    Specimen:  Blood Updated:  09/14/17 0617     Glucose 93 mg/dL      BUN 15 mg/dL      Creatinine 0.80 mg/dL      Sodium 131 (L) mmol/L      Potassium 3.0 (L) mmol/L      Chloride 101 mmol/L      CO2 22.0 (L) mmol/L      Calcium 8.6 mg/dL      eGFR Non African Amer 68 mL/min/1.73      BUN/Creatinine Ratio 18.8     Anion Gap 11.0 mmol/L     Narrative:       The MDRD GFR formula is only valid for adults with stable renal function between ages 18 and 70.          Pertinent Radiology Results:    Imaging Results (all)     Procedure Component Value Units Date/Time    CT Abdomen Pelvis Without Contrast [[id number]] Collected:  09/11/17 2138     Updated:  09/11/17 2140    Narrative:       FINAL REPORT    CLINICAL HISTORY:  BILAT FLANK PAIN, FEVER, HEMATURIA.    FINDINGS:  Multiple contiguous transaxial slices through the abdomen and pelvis were obtained without the intravenous administration of contrast with coronal reformatted images.    The bladder is enlarged with irregular wall thickening and prominent stranding of the adjacent fat. There is    High attenuation luminal content most consistent with bladder hemorrhage. This may be secondary to a hemorrhagic cystitis or a bleeding neoplasm which is not really identified. Recommend cystoscopy. There is moderate to severe right hydroureteronephrosis   and mild left hydroureteronephrosis. Findings are likely secondary to mechanical obstruction.    There is bilateral lower lobe  atelectasis with moderate pleural effusions. There is a pericardial effusion. The gallbladder is dilated with a moderate stone. The liver, spleen, pancreas, adrenal glands appear normal. The aorta and iliac arteries are   calcified. The bowel gas pattern is nonobstructive. The appendix is not visualized but there are no secondary signs suggest appendicitis.    Impression:    Irregular bladder wall thickening with adjacent fat stranding in luminal hemorrhage. Recommend cystoscopy    Bilateral hydroureteronephrosis suggesting mechanical obstruction    Bilateral pleural effusions, pericardial effusion and airspace disease    Cholelithiasis      Impression:       Authenticated by John Harper MD on 09/11/2017 09:38:46 PM    XR Pham OR Procedure [[id number]] Resulted:  09/13/17 1235     Updated:  09/13/17 1235          Condition on Discharge:      Stable vitals okay. Has had good by mouth intake. Intermittent confusion noted during the stay in the hospital. Patient has had a history of short-term memory deficit in the past has evidence of early Alzheimer-type dementia    Code status during the hospital stay:    Conditional Code    Discharge Disposition:    Skilled Nursing Facility (DC - External)    Discharge Medication:     Jose M Green   Home Medication Instructions KENY:[id number]    Printed on:09/14/17 0900   Medication Information                      atorvastatin (LIPITOR) 40 MG tablet  Take 1 tablet by mouth Daily.             cefuroxime (CEFTIN) 250 MG tablet  Take 1 tablet by mouth 2 (Two) Times a Day.              MG capsule  TAKE 1 CAPSULE BY MOUTH ONE TIME A DAY              latanoprost (XALATAN) 0.005 % ophthalmic solution  1 drop every night.             levothyroxine (SYNTHROID, LEVOTHROID) 100 MCG tablet  TAKE 1 TABLET BY MOUTH ONE TIME A DAY              metoprolol tartrate (LOPRESSOR) 50 MG tablet  TAKE 1 TABLET BY MOUTH TWO TIMES A DAY              mirtazapine (REMERON) 30 MG  tablet  TAKE 1 TABLET BY MOUTH IN THE EVENING              omeprazole (priLOSEC) 20 MG capsule  TAKE 1 CAPSULE BY MOUTH ONE TIME A DAY              oxybutynin XL (DITROPAN-XL) 10 MG 24 hr tablet  Take 1 tablet by mouth Daily.             traZODone (DESYREL) 50 MG tablet  TAKE 1 TABLET BY MOUTH AT BEDTIME AS NEEDED                 Discharge Diet: As tolerated        Activity at Discharge: May need rehabilitation and physical therapy and strengthening and gait training        Follow-up Appointments: With her urologist Dr. Mixon will need possible repeat cystoscopy      Follow-up with me as an outpatient in 2 weeks      Test Results Pending at Discharge:     Order Current Status    Tissue Pathology Exam In process    Blood Culture Preliminary result    Blood Culture Preliminary result             Speedy Bravo MD  09/14/17  9:00 AM    Time: Discharge 35 min

## 2017-09-14 NOTE — NURSING NOTE
Removed 3 way catheter per verbal order from dr pompa after he talked to dr baker , removed catheter @ 0930, pt voided at 1235 , checked  A post void residual pt showed 73 ml in bladder

## 2017-09-14 NOTE — SIGNIFICANT NOTE
09/14/17 1139   Rehab Treatment   Discipline occupational therapist   Treatment Not Performed patient/family declined treatment

## 2017-09-14 NOTE — PLAN OF CARE
Problem: Patient Care Overview (Adult)  Goal: Plan of Care Review  Outcome: Ongoing (interventions implemented as appropriate)    09/14/17 0656   Coping/Psychosocial Response Interventions   Plan Of Care Reviewed With patient;daughter   Patient Care Overview   Progress improving         Problem: Urine Elimination, Impaired (Adult)  Goal: Effective Urinary Elimination  Outcome: Ongoing (interventions implemented as appropriate)    09/14/17 0656   Urine Elimination, Impaired (Adult)   Effective Urinary Elimination making progress toward outcome

## 2017-09-14 NOTE — SIGNIFICANT NOTE
09/14/17 0949   Rehab Treatment   Discipline physical therapy assistant   Treatment Not Performed patient/family declined treatment  (Declined d/t nausea. Therapy will f/u with pt at a later time. )

## 2017-09-14 NOTE — SIGNIFICANT NOTE
09/14/17 1407   Rehab Treatment   Discipline occupational therapist   Treatment Not Performed patient/family declined treatment

## 2017-09-15 VITALS
SYSTOLIC BLOOD PRESSURE: 151 MMHG | WEIGHT: 154 LBS | HEIGHT: 65 IN | BODY MASS INDEX: 25.66 KG/M2 | RESPIRATION RATE: 18 BRPM | HEART RATE: 80 BPM | DIASTOLIC BLOOD PRESSURE: 74 MMHG | OXYGEN SATURATION: 100 % | TEMPERATURE: 98.6 F

## 2017-09-15 PROBLEM — R31.9 HEMATURIA: Status: ACTIVE | Noted: 2017-09-15

## 2017-09-15 LAB
ABO + RH BLD: NORMAL
ANION GAP SERPL CALCULATED.3IONS-SCNC: 11.9 MMOL/L
BASOPHILS # BLD AUTO: 0.04 10*3/MM3 (ref 0–0.2)
BASOPHILS NFR BLD AUTO: 0.5 % (ref 0–2.5)
BH BB BLOOD EXPIRATION DATE: NORMAL
BH BB BLOOD TYPE BARCODE: 1700
BH BB DISPENSE STATUS: NORMAL
BH BB PRODUCT CODE: NORMAL
BH BB UNIT NUMBER: NORMAL
BUN BLD-MCNC: 13 MG/DL (ref 7–20)
BUN/CREAT SERPL: 16.3 (ref 7.1–23.5)
CALCIUM SPEC-SCNC: 9 MG/DL (ref 8.4–10.2)
CHLORIDE SERPL-SCNC: 100 MMOL/L (ref 98–107)
CO2 SERPL-SCNC: 24 MMOL/L (ref 26–30)
CREAT BLD-MCNC: 0.8 MG/DL (ref 0.6–1.3)
CROSSMATCH INTERPRETATION: NORMAL
DEPRECATED RDW RBC AUTO: 48 FL (ref 37–54)
EOSINOPHIL # BLD AUTO: 0.29 10*3/MM3 (ref 0–0.7)
EOSINOPHIL NFR BLD AUTO: 3.7 % (ref 0–7)
ERYTHROCYTE [DISTWIDTH] IN BLOOD BY AUTOMATED COUNT: 15.6 % (ref 11.5–14.5)
GFR SERPL CREATININE-BSD FRML MDRD: 68 ML/MIN/1.73
GLUCOSE BLD-MCNC: 128 MG/DL (ref 74–98)
HCT VFR BLD AUTO: 33 % (ref 37–47)
HGB BLD-MCNC: 10.5 G/DL (ref 12–16)
IMM GRANULOCYTES # BLD: 0.04 10*3/MM3 (ref 0–0.06)
IMM GRANULOCYTES NFR BLD: 0.5 % (ref 0–0.6)
LYMPHOCYTES # BLD AUTO: 3.13 10*3/MM3 (ref 0.6–3.4)
LYMPHOCYTES NFR BLD AUTO: 39.5 % (ref 10–50)
MAGNESIUM SERPL-MCNC: 1.9 MG/DL (ref 1.6–2.3)
MCH RBC QN AUTO: 26.9 PG (ref 27–31)
MCHC RBC AUTO-ENTMCNC: 31.8 G/DL (ref 30–37)
MCV RBC AUTO: 84.4 FL (ref 81–99)
MONOCYTES # BLD AUTO: 0.58 10*3/MM3 (ref 0–0.9)
MONOCYTES NFR BLD AUTO: 7.3 % (ref 0–12)
NEUTROPHILS # BLD AUTO: 3.84 10*3/MM3 (ref 2–6.9)
NEUTROPHILS NFR BLD AUTO: 48.5 % (ref 37–80)
NRBC BLD MANUAL-RTO: 0 /100 WBC (ref 0–0)
PLATELET # BLD AUTO: 124 10*3/MM3 (ref 130–400)
PMV BLD AUTO: 11 FL (ref 6–12)
POTASSIUM BLD-SCNC: 2.9 MMOL/L (ref 3.5–5.1)
POTASSIUM BLD-SCNC: 3.3 MMOL/L (ref 3.5–5.1)
RBC # BLD AUTO: 3.91 10*6/MM3 (ref 4.2–5.4)
SODIUM BLD-SCNC: 133 MMOL/L (ref 137–145)
UNIT  ABO: NORMAL
UNIT  RH: NORMAL
WBC NRBC COR # BLD: 7.92 10*3/MM3 (ref 4.8–10.8)

## 2017-09-15 PROCEDURE — 99238 HOSP IP/OBS DSCHRG MGMT 30/<: CPT | Performed by: NURSE PRACTITIONER

## 2017-09-15 PROCEDURE — 25010000002 CEFTRIAXONE: Performed by: INTERNAL MEDICINE

## 2017-09-15 PROCEDURE — 85025 COMPLETE CBC W/AUTO DIFF WBC: CPT | Performed by: NURSE PRACTITIONER

## 2017-09-15 PROCEDURE — 83735 ASSAY OF MAGNESIUM: CPT | Performed by: NURSE PRACTITIONER

## 2017-09-15 PROCEDURE — 84132 ASSAY OF SERUM POTASSIUM: CPT | Performed by: NURSE PRACTITIONER

## 2017-09-15 PROCEDURE — 25010000003 POTASSIUM CHLORIDE 10 MEQ/100ML SOLUTION: Performed by: NURSE PRACTITIONER

## 2017-09-15 PROCEDURE — 80048 BASIC METABOLIC PNL TOTAL CA: CPT | Performed by: NURSE PRACTITIONER

## 2017-09-15 RX ORDER — POTASSIUM CHLORIDE 20 MEQ/1
40 TABLET, EXTENDED RELEASE ORAL DAILY
Qty: 4 TABLET | Refills: 0
Start: 2017-09-15 | End: 2017-09-17

## 2017-09-15 RX ORDER — POTASSIUM CHLORIDE 750 MG/1
20 CAPSULE, EXTENDED RELEASE ORAL ONCE
Status: COMPLETED | OUTPATIENT
Start: 2017-09-15 | End: 2017-09-15

## 2017-09-15 RX ORDER — POTASSIUM CHLORIDE 20 MEQ/1
40 TABLET, EXTENDED RELEASE ORAL ONCE
Status: COMPLETED | OUTPATIENT
Start: 2017-09-15 | End: 2017-09-15

## 2017-09-15 RX ORDER — POTASSIUM CHLORIDE 7.45 MG/ML
10 INJECTION INTRAVENOUS
Status: DISPENSED | OUTPATIENT
Start: 2017-09-15 | End: 2017-09-15

## 2017-09-15 RX ORDER — CEFUROXIME AXETIL 250 MG/1
250 TABLET ORAL 2 TIMES DAILY
Qty: 14 TABLET | Refills: 0 | Status: SHIPPED | OUTPATIENT
Start: 2017-09-15 | End: 2017-09-22

## 2017-09-15 RX ADMIN — POTASSIUM CHLORIDE 10 MEQ: 10 INJECTION, SOLUTION INTRAVENOUS at 10:25

## 2017-09-15 RX ADMIN — CEFTRIAXONE 1 G: 1 INJECTION, POWDER, FOR SOLUTION INTRAMUSCULAR; INTRAVENOUS at 01:05

## 2017-09-15 RX ADMIN — POTASSIUM CHLORIDE 40 MEQ: 20 TABLET, EXTENDED RELEASE ORAL at 11:47

## 2017-09-15 RX ADMIN — METOPROLOL TARTRATE 50 MG: 50 TABLET ORAL at 08:18

## 2017-09-15 RX ADMIN — LEVOTHYROXINE SODIUM 100 MCG: 100 TABLET ORAL at 06:06

## 2017-09-15 RX ADMIN — POTASSIUM CHLORIDE 20 MEQ: 750 CAPSULE, EXTENDED RELEASE ORAL at 14:41

## 2017-09-15 RX ADMIN — AMLODIPINE BESYLATE 2.5 MG: 5 TABLET ORAL at 08:18

## 2017-09-15 RX ADMIN — PANTOPRAZOLE SODIUM 40 MG: 40 TABLET, DELAYED RELEASE ORAL at 06:06

## 2017-09-15 RX ADMIN — ATORVASTATIN CALCIUM 40 MG: 40 TABLET, FILM COATED ORAL at 08:18

## 2017-09-15 NOTE — DISCHARGE SUMMARY
[facility]   DISCHARGE SUMMARY- ADDENDUM      Name:  Jose M Green   Age:  86 y.o.  Sex:  female  :  1931  MRN:  [id number]   Visit Number:  [id number]  Primary Care Physician:  Speedy Bravo MD  Date of Discharge:  9/15/2017  Admission Date:  17      Discharge Diagnosis:     Active Hospital Problems (** Indicates Principal Problem)    Diagnosis Date Noted   • Hematuria [R31.9] 09/15/2017   • Blood clot in bladder [N32.89] 2017   • Acute cystitis with hematuria [N30.01] 2017   • CAD (coronary artery disease) [I25.10] 2016   • Paroxysmal atrial fibrillation [I48.0] 09/15/2016   • Essential hypertension [I10] 2016   • Chronic lymphocytic leukemia [C91.10] 2005      Resolved Hospital Problems    Diagnosis Date Noted Date Resolved   No resolved problems to display.       Presenting Problem/History of Present Illness:    Blood clot in bladder [N32.89]  Blood clot in bladder [N32.89]         Consults:     Consults     Date and Time Order Name Status Description    2017 0856 Inpatient Consult to Urology      2017 2344 Urology (on-call MD unless specified) Completed           Procedures Performed:    Procedure(s):  CYSTOSCOPY CYSTOGRAM, EVACUATION OF BLADDER CLOTS  CYSTOSCOPY BLADDER BIOPSY         Addendum to the Discharge summary:  This is an 86-year-old female with a history of chronic lymphocytic leukemia, hypertension, DVT, A. fib, hypothyroid who come in with a 3 to four-month history of recurrent urinary tract infection should been on multiple antibiotics.  She taken Ceftin in August and was currently on Macrobid at the time of admission.  She complained of mild to moderate suprapubic discomfort.  She was also noted to be febrile with a temp of 100.7.  She also had some urinary incontinence with hematuria.  She did see a urologist Dr. Mixon.  She is followed by Dr. Ramirez for coronary artery disease and atrial fibrillation.   She has been on Eliquis with aspirin and Plavix however recently the Plavix was discontinued.  According to the patient she has been having gross hematuria in her urine.  She came into the emergency room for further evaluation.  Dr. Avalos was consulted from the emergency room patient was admitted to the hospitalist service.  Dr. Avalos felt that her hematuria was likely secondary to her anticoagulation antiplatelet therapy.  He did feel that she needed a cystoscopy to rule out bladder tumor.  She was taken to the operating room where she underwent cystoscopy with bladder biopsy and transurethral resection of a bladder tumor.  She was noted to have a bladder tumor on the left side of the bladder that was visualized and biopsied.  The biopsy is still pending.  Three-way Ross was placed and was started on bladder irrigation.  Patient was continued on bladder irrigation until her urine was completely clear.  She did have evidence of acute cystitis with hematuria and has been on empiric antibiotics.  Her cultures were negative.  Her cultures from the past were reviewed however did show various organisms at different times however primarily consisted of Klebsiella and Enterobacter.  Her primary care provider Dr. Bravo has placed her on Ceftin 250 mg twice a day for 7 days upon discharge.      I have seen and evaluated the patient today and had a long discussion with the patient and her daughter who is at bedside regarding the findings on the cystoscopy as well as the outpatient plan.  She will follow-up with Dr. Mixon in 1 week as per Dr. Bravo's recommendations where she may require repeat cystoscopy.  By that time her biopsies and pathology should be back.  I have answered questions from the patient and the daughter.  She was noted this morning to have some hypokalemia for which I have ordered potassium supplementation.  We will repeat a potassium this afternoon and if it has improved and stable we can plan for  discharge to rehabilitation today.  Her antiplatelet and anticoagulation are currently on hold and will remain so until further workup and evaluation can be done.  She can follow-up with Dr. Bravo, Dr. Ramirez and Dr. Mixon if no further hematuria and is cleared from the urology standpoint and at that point can consider restarting the antiplatelet and anticoagulation.  I will place her on a few more days of potassium and have a repeat BMP on Monday as well.      Vital Signs:    Temp:  [97.5 °F (36.4 °C)-98.4 °F (36.9 °C)] 97.5 °F (36.4 °C)  Heart Rate:  [68-87] 80  Resp:  [16-22] 18  BP: (129-178)/(68-86) 165/70    Physical Exam:  General Appearance:  Alert and cooperative, not in any acute distress.   Head:  Atraumatic and normocephalic, without obvious abnormality.   Eyes:          PERRLA, conjunctivae and sclerae normal, no Icterus. No pallor. Extra-occular movements are within normal limits.   Ears:  Ears appear intact with no abnormalities noted.   Throat: No oral lesions, no thrush, oral mucosa moist.   Neck: Supple, trachea midline, no thyromegaly, no carotid bruit.   Back:   No kyphoscoliosis present. No tenderness to palpation,   range of motion normal.   Lungs:   Chest shape is normal. Breath sounds heard bilaterally equally.  No crackles or wheezing. No Pleural rub or bronchial breathing.   Heart:  Normal S1 and S2, no murmur, no gallop, no rub. No JVD   Abdomen:   Normal bowel sounds, no masses, no organomegaly. Soft     non-tender, non-distended, no guarding, no rebound                tenderness   Extremities: Moves all extremities well, no edema, no cyanosis, no clubbing.   Pulses: Pulses palpable and equal bilaterally   Skin: No bleeding, bruising or rash   Lymph nodes: No palpable adenopathy   Neurologic:    Psychiatric/Behavior:     Cranial nerves 2 - 12 grossly intact, sensation intact, Motor power is normal and equal bilaterally.  Mood normal, behavior normal           Pertinent Lab Results:      Lab Results (last 24 hours)     Procedure Component Value Units Date/Time    Blood Culture [[id number]]  (Normal) Collected:  09/11/17 2341    Specimen:  Blood from Arm, Left Updated:  09/15/17 0006     Blood Culture No growth at 3 days    Blood Culture [[id number]]  (Normal) Collected:  09/11/17 2341    Specimen:  Blood from Arm, Right Updated:  09/15/17 0006     Blood Culture No growth at 3 days    CBC & Differential [[id number]] Collected:  09/15/17 0851    Specimen:  Blood Updated:  09/15/17 0901    Narrative:       The following orders were created for panel order CBC & Differential.  Procedure                               Abnormality         Status                     ---------                               -----------         ------                     CBC Auto Differential[[id number]]        Abnormal            Final result                 Please view results for these tests on the individual orders.    CBC Auto Differential [[id number]]  (Abnormal) Collected:  09/15/17 0851    Specimen:  Blood Updated:  09/15/17 0901     WBC 7.92 10*3/mm3      RBC 3.91 (L) 10*6/mm3      Hemoglobin 10.5 (L) g/dL      Hematocrit 33.0 (L) %      MCV 84.4 fL      MCH 26.9 (L) pg      MCHC 31.8 g/dL      RDW 15.6 (H) %      RDW-SD 48.0 fl      MPV 11.0 fL      Platelets 124 (L) 10*3/mm3      Neutrophil % 48.5 %      Lymphocyte % 39.5 %      Monocyte % 7.3 %      Eosinophil % 3.7 %      Basophil % 0.5 %      Immature Grans % 0.5 %      Neutrophils, Absolute 3.84 10*3/mm3      Lymphocytes, Absolute 3.13 10*3/mm3      Monocytes, Absolute 0.58 10*3/mm3      Eosinophils, Absolute 0.29 10*3/mm3      Basophils, Absolute 0.04 10*3/mm3      Immature Grans, Absolute 0.04 10*3/mm3      nRBC 0.0 /100 WBC     Magnesium [[id number]]  (Normal) Collected:  09/15/17 0851    Specimen:  Blood Updated:  09/15/17 0914     Magnesium 1.9 mg/dL     Basic Metabolic Panel [[id number]]  (Abnormal) Collected:  09/15/17 0851    Specimen:  Blood Updated:   09/15/17 0916     Glucose 128 (H) mg/dL      BUN 13 mg/dL      Creatinine 0.80 mg/dL      Sodium 133 (L) mmol/L      Potassium 2.9 (C) mmol/L      Chloride 100 mmol/L      CO2 24.0 (L) mmol/L      Calcium 9.0 mg/dL      eGFR Non African Amer 68 mL/min/1.73      BUN/Creatinine Ratio 16.3     Anion Gap 11.9 mmol/L     Narrative:       The MDRD GFR formula is only valid for adults with stable renal function between ages 18 and 70.    Potassium [[id number]]  (Abnormal) Collected:  09/15/17 1306    Specimen:  Blood Updated:  09/15/17 1329     Potassium 3.3 (L) mmol/L           Pertinent Radiology Results:    Imaging Results (last 24 hours)     ** No results found for the last 24 hours. **          Condition on Discharge:    Stable      Discharge Disposition:    Rehab Facility or Unit (DC - External)    Discharge Medication:     Jose M Green   Home Medication Instructions KENY:[id number]    Printed on:09/15/17 6994   Medication Information                      atorvastatin (LIPITOR) 40 MG tablet  Take 1 tablet by mouth Daily.             cefuroxime (CEFTIN) 250 MG tablet  Take 1 tablet by mouth 2 (Two) Times a Day for 7 days.              MG capsule  TAKE 1 CAPSULE BY MOUTH ONE TIME A DAY              latanoprost (XALATAN) 0.005 % ophthalmic solution  1 drop every night.             levothyroxine (SYNTHROID, LEVOTHROID) 100 MCG tablet  TAKE 1 TABLET BY MOUTH ONE TIME A DAY              metoprolol tartrate (LOPRESSOR) 50 MG tablet  TAKE 1 TABLET BY MOUTH TWO TIMES A DAY              mirtazapine (REMERON) 30 MG tablet  TAKE 1 TABLET BY MOUTH IN THE EVENING              omeprazole (priLOSEC) 20 MG capsule  TAKE 1 CAPSULE BY MOUTH ONE TIME A DAY              oxybutynin XL (DITROPAN-XL) 10 MG 24 hr tablet  Take 1 tablet by mouth Daily.             potassium chloride (K-DUR,KLOR-CON) 20 MEQ CR tablet  Take 2 tablets by mouth Daily for 2 days.             traZODone (DESYREL) 50 MG tablet  TAKE 1 TABLET BY MOUTH AT  BEDTIME AS NEEDED                 Discharge Diet: Regular        Activity at Discharge:  As tolerates        Additional Discharge Instructions:  CBC and BMP on Monday      Follow-up Appointments:    Future Appointments  Date Time Provider Department Center   12/8/2017 11:00 AM Speedy Bravo MD [facility] None   Dr. Mixon- 1 week.  Dr. Ramirez- 3 weeks      Test Results Pending at Discharge:     Order Current Status    Tissue Pathology Exam In process    Blood Culture Preliminary result    Blood Culture Preliminary result          Please Dr. Bravo's discharge summary dated 9/14/17 4 hospital course, medications and complete discharge instructions.    Azeb Sweeney, APRN  09/15/17  1:38 PM

## 2017-09-15 NOTE — PLAN OF CARE
Problem: Urine Elimination, Impaired (Adult)  Goal: Effective Urinary Elimination  Outcome: Ongoing (interventions implemented as appropriate)

## 2017-09-15 NOTE — PLAN OF CARE
Problem: Patient Care Overview (Adult)  Goal: Plan of Care Review  Outcome: Ongoing (interventions implemented as appropriate)    09/15/17 0145   Coping/Psychosocial Response Interventions   Plan Of Care Reviewed With patient   Patient Care Overview   Progress improving   Outcome Evaluation   Outcome Summary/Follow up Plan Plan for patient to be discharged to rehab facility today. Patient is voiding without any complaints.        Goal: Adult Individualization and Mutuality  Outcome: Ongoing (interventions implemented as appropriate)  Goal: Discharge Needs Assessment  Outcome: Ongoing (interventions implemented as appropriate)    Problem: Urine Elimination, Impaired (Adult)  Goal: Effective Urinary Elimination  Outcome: Ongoing (interventions implemented as appropriate)

## 2017-09-15 NOTE — PROGRESS NOTES
Contacted Urmila at [facility] states able to accept patient today.    Patient and family is agreeable informed pt daughter someone in the family will need to go with her or as she goes  In order to assist with paper work.    EMS filled out given to unit clerkContinued Stay Note  SOFY Pham     Patient Name: Jose M Green  MRN: [id number]  Today's Date: 9/15/2017    Admit Date: 9/11/2017          Discharge Plan     None              Discharge Codes     None        Expected Discharge Date and Time     Expected Discharge Date Expected Discharge Time    Sep 15, 2017             Shannon Vann RN

## 2017-09-15 NOTE — DISCHARGE PLACEMENT REQUEST
"Jose M Green (86 y.o. Female)     Date of Birth Social Security Number Address Home Phone MRN    06/23/1931  [address] [place] [address] [phone] [id number]    [employer] Marital Status          Jesús        Admission Date Admission Type Admitting Provider Attending Provider Department, Room/Bed    9/11/17 Emergency Smith Ferrell DO Patel, Ashish M, MD [facility] MED SURG  4, 423/1    Discharge Date Discharge Disposition Discharge Destination         Skilled Nursing Facility (DC - External) Home            Attending Provider: Speedy Bravo MD     Allergies:  Codeine, Contrast Dye, Aspirin    Isolation:  None   Infection:  None   Code Status:  Conditional    Ht:  65\" (165.1 cm)   Wt:  154 lb (69.9 kg)    Admission Cmt:  None   Principal Problem:  None                Active Insurance as of 9/11/2017     Primary Coverage     Payor Plan Insurance Group Employer/Plan Group    MEDICARE MEDICARE A & B      Payor Plan Address Payor Plan Phone Number Effective From Effective To    [address] [phone] 6/1/1996     [place] [address]       Subscriber Name Subscriber Birth Date Member ID       JOSE M GREEN 6/23/1931 [id number]           Secondary Coverage     Payor Plan Insurance Group Employer/Plan Group    [employer] [place]ICAID      Payor Plan Address Payor Plan Phone Number Effective From Effective To    [address] [address] [phone] 6/14/2016     [place] [address]       Subscriber Name Subscriber Birth Date Member ID       JOSE M GREEN 6/23/1931 [id number]                 Emergency Contacts      (Rel.) Home Phone Work Phone Mobile Phone    Toya Ying (Daughter) -- -- [phone]    Ant Francois (Relative) [phone] -- --            Hospital Medications (active)       Dose Frequency Start End    acetaminophen (TYLENOL) tablet 650 mg 650 mg Every 4 Hours PRN 9/12/2017     Sig - Route: Take 2 tablets by mouth Every 4 " "(Four) Hours As Needed for Mild Pain . - Oral    amLODIPine (NORVASC) tablet 2.5 mg 2.5 mg Every 24 Hours Scheduled 9/12/2017     Sig - Route: Take 0.5 tablets by mouth Daily. - Oral    atorvastatin (LIPITOR) tablet 40 mg 40 mg Daily 9/12/2017     Sig - Route: Take 1 tablet by mouth Daily. - Oral    cefTRIAXone (ROCEPHIN) 1 g/100 mL 0.9% NS VTB (mbp) 1 g Every 24 Hours 9/13/2017     Sig - Route: Infuse 100 mL into a venous catheter Daily. - Intravenous    CHAPSTICK 1 each 1 each Every 2 Hours PRN 9/13/2017     Sig - Route: Apply 1 each topically Every 2 (Two) Hours As Needed (dry/ chapped lips). - Apply externally    ipratropium-albuterol (DUO-NEB) nebulizer solution 3 mL 3 mL Every 6 Hours PRN 9/12/2017     Sig - Route: Take 3 mL by nebulization Every 6 (Six) Hours As Needed for Shortness of Air. - Nebulization    latanoprost (XALATAN) 0.005 % ophthalmic solution 1 drop 1 drop Nightly 9/12/2017     Sig - Route: Administer 1 drop to both eyes Every Night. - Both Eyes    levothyroxine (SYNTHROID, LEVOTHROID) tablet 100 mcg 100 mcg Every Early Morning 9/12/2017     Sig - Route: Take 1 tablet by mouth Every Morning. - Oral    metoprolol tartrate (LOPRESSOR) tablet 50 mg 50 mg Every 12 Hours Scheduled 9/12/2017     Sig - Route: Take 1 tablet by mouth Every 12 (Twelve) Hours. - Oral    mirtazapine (REMERON) tablet 30 mg 30 mg Nightly 9/12/2017     Sig - Route: Take 2 tablets by mouth Every Night. - Oral    morphine injection 2 mg 2 mg Every 4 Hours PRN 9/12/2017 9/22/2017    Sig - Route: Infuse 1 mL into a venous catheter Every 4 (Four) Hours As Needed for Moderate Pain . - Intravenous    Linked Group 1:  \"And\" Linked Group Details        naloxone (NARCAN) injection 0.4 mg 0.4 mg Every 5 Minutes PRN 9/12/2017     Sig - Route: Infuse 1 mL into a venous catheter Every 5 (Five) Minutes As Needed for Respiratory Depression. - Intravenous    Linked Group 1:  \"And\" Linked Group Details        ondansetron (ZOFRAN) injection 4 " mg 4 mg Every 6 Hours PRN 9/12/2017     Sig - Route: Infuse 2 mL into a venous catheter Every 6 (Six) Hours As Needed for Nausea or Vomiting. - Intravenous    pantoprazole (PROTONIX) EC tablet 40 mg 40 mg Every Morning 9/12/2017     Sig - Route: Take 1 tablet by mouth Every Morning. - Oral    Pharmacy Meds to Bed Consult  Daily (Monday-Friday) 9/12/2017     Sig - Route: Daily (Monday-Friday). - Does not apply    potassium chloride 10 mEq in 100 mL IVPB 10 mEq Every 1 Hour 9/15/2017 9/15/2017    Sig - Route: Infuse 100 mL into a venous catheter Every 1 (One) Hour. - Intravenous    sodium chloride 0.9 % flush 1-10 mL 1-10 mL As Needed 9/12/2017     Sig - Route: Infuse 1-10 mL into a venous catheter As Needed for Line Care. - Intravenous    traZODone (DESYREL) tablet 50 mg 50 mg Nightly PRN 9/12/2017     Sig - Route: Take 1 tablet by mouth At Night As Needed for Sleep. - Oral             Physical Therapy Notes (last 24 hours) (Notes from 9/14/2017 10:10 AM through 9/15/2017 10:10 AM)      Ty Cui PTA at 9/14/2017 11:39 AM  Version 1 of 1 09/14/17 0949   Rehab Treatment   Discipline physical therapy assistant   Treatment Not Performed patient/family declined treatment  (Declined d/t nausea. Therapy will f/u with pt at a later time. )        Electronically signed by Ty Cui PTA at 9/14/2017 11:39 AM           Occupational Therapy Notes (last 24 hours) (Notes from 9/14/2017 10:10 AM through 9/15/2017 10:10 AM)      Priscilla Cerna OT at 9/14/2017 11:49 AM  Version 1 of 1 09/14/17 1139   Rehab Treatment   Discipline occupational therapist   Treatment Not Performed patient/family declined treatment        Electronically signed by Priscilla Cerna OT at 9/14/2017 11:49 AM      Priscilla Cerna OT at 9/14/2017  2:29 PM  Version 1 of 1 09/14/17 1407   Rehab Treatment   Discipline occupational therapist   Treatment Not Performed patient/family declined treatment         Electronically signed by Priscilla Cerna OT at 2017  2:29 PM           Discharge Summary      Speedy Bravo MD at 2017  8:58 AM              [facility]   DISCHARGE SUMMARY      Name:  Jose M Green   Age:  86 y.o.  Sex:  female  :  1931  MRN:  [id number]   Visit Number:  [id number]  Primary Care Physician:  Speedy Bravo MD  Date of Discharge:  2017  Admission Date:  2017      Discharge Diagnosis:     Gross hematuria, recurring cystitis, atrial fibrillation, hypertension, hypothyroidism    Active Problems:    Chronic lymphocytic leukemia remains stable noted to be anemic but that was secondary to the hematuria. Given a unit of blood and an iron infusion hemoglobin better today. Follow CBC in rehabilitation facility within a week.    Essential hypertension stable however initially due to dehydration is inhibitors were discontinued along with discontinuation of ARB with HCTZ    Paroxysmal atrial fibrillation rate control okay with beta blockers. However a decision was made to discontinue anticoagulant therapy in view of her significant bleed discussed this with patient, daughter and son who understand and agree the risks and benefits of this. Would stay off anticoagulant therapy for at least a month if not more    CAD (coronary artery disease) angina free on beta blockers and statins however aspirin and Plavix discontinued because of significant bleeding. Once again patient and son, daughter made aware of risk and benefits.    Acute cystitis with hematuria urology consult obtained. Initial blood culture and urine culture without any growth. Patient had come in on oral antibiotic therapy. Underwent cystoscopy blood clots noted biopsy has been obtained results are still pending.Significant history of tobacco use in the past. CT of abdomen and pelvis with evidence of urine outflow obstruction. Renal function back to normal. Urine cultures from the past  reviewed these showed various organisms at different times namely Klebsiella, Enterobacter.  There were sensitive to cephalosporins hence we will continue that again    Blood clot in bladder. Underwent intense bladder irrigation now with significant delay clear urine in bag. This is being discontinued patient to be voiding on herself after which we will check a postvoid residual      Presenting Problem/History of Present Illness:    Blood clot in bladder [N32.89]  Blood clot in bladder [N32.89]     Consults:     Consults     Date and Time Order Name Status Description    9/14/2017 0856 Inpatient Consult to Urology      9/11/2017 2344 Urology (on-call MD unless specified) Completed           Procedures Performed:    Procedure(s):  CYSTOSCOPY CYSTOGRAM, EVACUATION OF BLADDER CLOTS  CYSTOSCOPY BLADDER BIOPSY         History of presenting illness:        Hospital Course:    See details above  Review of Systems:  Review of Systems   Constitutional: Negative.  Negative for activity change, appetite change, fatigue and fever.   HENT: Negative for congestion, ear discharge, ear pain and trouble swallowing.    Eyes: Negative for photophobia and visual disturbance.   Respiratory: Negative for cough and shortness of breath.    Cardiovascular: Negative for chest pain and palpitations.   Gastrointestinal: Negative for abdominal distention, abdominal pain, constipation, diarrhea, nausea and vomiting.   Endocrine: Negative.    Genitourinary: Positive for difficulty urinating, frequency and hematuria. Negative for dysuria and urgency.   Musculoskeletal: Positive for arthralgias and back pain. Negative for joint swelling and myalgias.   Skin: Negative for color change and rash.   Allergic/Immunologic: Negative.    Neurological: Negative for dizziness, weakness, light-headedness and headaches.   Hematological: Negative for adenopathy. Does not bruise/bleed easily.   Psychiatric/Behavioral: Positive for decreased concentration and  sleep disturbance. Negative for agitation, confusion and dysphoric mood. The patient is nervous/anxious.         Vital Signs:    Temp:  [98.1 °F (36.7 °C)-99.8 °F (37.7 °C)] 99.4 °F (37.4 °C)  Heart Rate:  [] 84  Resp:  [16-20] 16  BP: ()/(51-84) 158/84    Physical Exam:  Physical Exam   Constitutional: She appears well-developed and well-nourished. No distress.   HENT:   Nose: Nose normal.   Mouth/Throat: Oropharynx is clear and moist.   Eyes: Conjunctivae and EOM are normal. No scleral icterus.   Neck: No tracheal deviation present. No thyromegaly present.   Cardiovascular: Normal rate and regular rhythm.  Exam reveals no friction rub.    No murmur heard.  Pulmonary/Chest: No respiratory distress. She has no wheezes. She has no rales.   Abdominal: Soft. She exhibits no distension and no mass. There is no tenderness. There is no guarding.   Musculoskeletal: Normal range of motion. She exhibits tenderness and deformity.   Lymphadenopathy:     She has no cervical adenopathy.   Neurological: She is alert. She has normal reflexes. No cranial nerve deficit. Coordination normal.   Skin: Skin is warm and dry. No rash noted. No erythema.   Psychiatric: She has a normal mood and affect. Her behavior is normal. Judgment and thought content normal.       Pertinent Lab Results:     Lab Results (last 72 hours)     Procedure Component Value Units Date/Time    CBC & Differential [[id number]] Collected:  09/11/17 2044    Specimen:  Blood Updated:  09/11/17 2055    Narrative:       The following orders were created for panel order CBC & Differential.  Procedure                               Abnormality         Status                     ---------                               -----------         ------                     CBC Auto Differential[[id number]]        Abnormal            Final result                 Please view results for these tests on the individual orders.    CBC Auto Differential [[id number]]  (Abnormal)  Collected:  09/11/17 2044    Specimen:  Blood Updated:  09/11/17 2055     WBC 18.17 (H) 10*3/mm3      RBC 3.86 (L) 10*6/mm3      Hemoglobin 10.4 (L) g/dL      Hematocrit 32.9 (L) %      MCV 85.2 fL      MCH 26.9 (L) pg      MCHC 31.6 g/dL      RDW 15.5 (H) %      RDW-SD 47.7 fl      MPV 11.0 fL      Platelets 165 10*3/mm3      Neutrophil % 53.8 %      Lymphocyte % 38.1 %      Monocyte % 7.2 %      Eosinophil % 0.0 %      Basophil % 0.3 %      Immature Grans % 0.6 %      Neutrophils, Absolute 9.78 (H) 10*3/mm3      Lymphocytes, Absolute 6.92 (H) 10*3/mm3      Monocytes, Absolute 1.30 (H) 10*3/mm3      Eosinophils, Absolute 0.00 10*3/mm3      Basophils, Absolute 0.06 10*3/mm3      Immature Grans, Absolute 0.11 (H) 10*3/mm3      nRBC 0.0 /100 WBC     Comprehensive Metabolic Panel [[id number]]  (Abnormal) Collected:  09/11/17 2044    Specimen:  Blood Updated:  09/11/17 2119     Glucose 129 (H) mg/dL      BUN 15 mg/dL       Specimen hemolyzed. Results may be affected.        Creatinine 0.80 mg/dL      Sodium 129 (L) mmol/L      Potassium 4.2 mmol/L       Specimen hemolyzed.  Results may be affected.        Chloride 94 (L) mmol/L      CO2 25.0 (L) mmol/L      Calcium 9.5 mg/dL      Total Protein 6.9 g/dL      Albumin 4.10 g/dL      ALT (SGPT) 30 U/L       Specimen hemolyzed.  Results may be affected.        AST (SGOT) 42 U/L       Specimen hemolyzed.  Results may be affected.        Alkaline Phosphatase 142 (H) U/L       Specimen hemolyzed. Results may be affected.        Total Bilirubin 1.1 mg/dL      eGFR Non African Amer 68 mL/min/1.73      Globulin 2.8 gm/dL      A/G Ratio 1.5 g/dL      BUN/Creatinine Ratio 18.8     Anion Gap 14.2 mmol/L     Narrative:       The MDRD GFR formula is only valid for adults with stable renal function between ages 18 and 70.    Urinalysis With / Culture If Indicated [[id number]]  (Abnormal) Collected:  09/11/17 2044    Specimen:  Urine from Urine, Catheter Updated:  09/11/17 2130     Color,  UA Red (A)     Appearance, UA Cloudy (A)     pH, UA >=9.0 (H)     Specific Gravity, UA 1.010     Glucose,  mg/dL (2+) (A)     Ketones, UA >=160 mg/dL (4+) (A)     Bilirubin, UA Large (3+) (A)     Blood, UA Large (3+) (A)     Protein, UA >=300 mg/dL (3+) (A)     Leuk Esterase, UA Large (3+) (A)     Nitrite, UA Positive (A)     Urobilinogen, UA >=8.0 E.U./dL (A)    Urinalysis, Microscopic Only [[id number]]  (Abnormal) Collected:  09/11/17 2044    Specimen:  Urine from Urine, Catheter Updated:  09/11/17 2130     RBC, UA Too Numerous to Count (A) /HPF      WBC, UA  /HPF      Unable to determine due to loaded field (A)     Bacteria, UA  /HPF      Unable to determine due to loaded field (A)     Squamous Epithelial Cells, UA  /HPF      Unable to determine due to loaded field (A)     Hyaline Casts, UA  /LPF      Unable to determine due to loaded field     Methodology Manual Light Microscopy    Lactic Acid, Plasma [[id number]]  (Normal) Collected:  09/11/17 2347    Specimen:  Blood Updated:  09/12/17 0009     Lactate 0.9 mmol/L     CBC Auto Differential [[id number]]  (Abnormal) Collected:  09/12/17 0529    Specimen:  Blood Updated:  09/12/17 0619     WBC 11.74 (H) 10*3/mm3      RBC 3.18 (L) 10*6/mm3      Hemoglobin 8.6 (L) g/dL      Hematocrit 27.4 (L) %      MCV 86.2 fL      MCH 27.0 pg      MCHC 31.4 g/dL      RDW 15.5 (H) %      RDW-SD 49.1 fl      MPV 11.5 fL      Platelets 103 (L) 10*3/mm3      Neutrophil % 57.9 %      Lymphocyte % 33.6 %      Monocyte % 7.4 %      Eosinophil % 0.0 %      Basophil % 0.3 %      Immature Grans % 0.8 (H) %      Neutrophils, Absolute 6.79 10*3/mm3      Lymphocytes, Absolute 3.95 (H) 10*3/mm3      Monocytes, Absolute 0.87 10*3/mm3      Eosinophils, Absolute 0.00 10*3/mm3      Basophils, Absolute 0.04 10*3/mm3      Immature Grans, Absolute 0.09 (H) 10*3/mm3      nRBC 0.0 /100 WBC     Basic Metabolic Panel [[id number]]  (Abnormal) Collected:  09/12/17 0529    Specimen:  Blood Updated:   09/12/17 0620     Glucose 115 (H) mg/dL      BUN 14 mg/dL      Creatinine 0.70 mg/dL      Sodium 130 (L) mmol/L      Potassium 3.5 mmol/L      Chloride 99 mmol/L      CO2 23.0 (L) mmol/L      Calcium 9.0 mg/dL      eGFR Non African Amer 79 mL/min/1.73      BUN/Creatinine Ratio 20.0     Anion Gap 11.5 mmol/L     Narrative:       The MDRD GFR formula is only valid for adults with stable renal function between ages 18 and 70.    Urine Culture [[id number]]  (Normal) Collected:  09/11/17 2044    Specimen:  Urine from Urine, Catheter Updated:  09/13/17 0536     Urine Culture No growth    CBC (No Diff) [[id number]]  (Abnormal) Collected:  09/13/17 0554    Specimen:  Blood Updated:  09/13/17 0631     WBC 9.94 10*3/mm3      RBC 2.81 (L) 10*6/mm3      Hemoglobin 7.5 (C) g/dL      Hematocrit 24.4 (L) %      MCV 86.8 fL      MCH 26.7 (L) pg      MCHC 30.7 g/dL      RDW 15.4 (H) %      RDW-SD 49.1 fl      MPV 11.4 fL      Platelets 83 (L) 10*3/mm3     Comprehensive Metabolic Panel [[id number]]  (Abnormal) Collected:  09/13/17 0554    Specimen:  Blood Updated:  09/13/17 0639     Glucose 86 mg/dL      BUN 20 mg/dL      Creatinine 0.90 mg/dL      Sodium 133 (L) mmol/L      Potassium 3.3 (L) mmol/L      Chloride 101 mmol/L      CO2 24.0 (L) mmol/L      Calcium 8.5 mg/dL      Total Protein 4.8 (L) g/dL      Albumin 2.70 (L) g/dL      ALT (SGPT) 32 U/L      AST (SGOT) 20 U/L      Alkaline Phosphatase 95 U/L      Total Bilirubin 0.3 mg/dL      eGFR Non African Amer 59 (L) mL/min/1.73      Globulin 2.1 gm/dL      A/G Ratio 1.3 g/dL      BUN/Creatinine Ratio 22.2     Anion Gap 11.3 mmol/L     Narrative:       The MDRD GFR formula is only valid for adults with stable renal function between ages 18 and 70.    Tissue Pathology Exam [[id number]] Collected:  09/12/17 1511    Specimen:  Tissue from Urinary Bladder Updated:  09/13/17 0950    Blood Culture [[id number]]  (Normal) Collected:  09/11/17 2341    Specimen:  Blood from Arm, Left Updated:   09/14/17 0003     Blood Culture No growth at 2 days    Blood Culture [[id number]]  (Normal) Collected:  09/11/17 2341    Specimen:  Blood from Arm, Right Updated:  09/14/17 0003     Blood Culture No growth at 2 days    CBC (No Diff) [[id number]]  (Abnormal) Collected:  09/14/17 0518    Specimen:  Blood Updated:  09/14/17 0557     WBC 9.87 10*3/mm3      RBC 3.83 (L) 10*6/mm3      Hemoglobin 10.1 (L) g/dL      Hematocrit 32.3 (L) %      MCV 84.3 fL      MCH 26.4 (L) pg      MCHC 31.3 g/dL      RDW 15.4 (H) %      RDW-SD 46.7 fl      MPV 11.2 fL      Platelets 105 (L) 10*3/mm3     Basic Metabolic Panel [[id number]]  (Abnormal) Collected:  09/14/17 0518    Specimen:  Blood Updated:  09/14/17 0617     Glucose 93 mg/dL      BUN 15 mg/dL      Creatinine 0.80 mg/dL      Sodium 131 (L) mmol/L      Potassium 3.0 (L) mmol/L      Chloride 101 mmol/L      CO2 22.0 (L) mmol/L      Calcium 8.6 mg/dL      eGFR Non African Amer 68 mL/min/1.73      BUN/Creatinine Ratio 18.8     Anion Gap 11.0 mmol/L     Narrative:       The MDRD GFR formula is only valid for adults with stable renal function between ages 18 and 70.          Pertinent Radiology Results:    Imaging Results (all)     Procedure Component Value Units Date/Time    CT Abdomen Pelvis Without Contrast [[id number]] Collected:  09/11/17 2138     Updated:  09/11/17 2140    Narrative:       FINAL REPORT    CLINICAL HISTORY:  BILAT FLANK PAIN, FEVER, HEMATURIA.    FINDINGS:  Multiple contiguous transaxial slices through the abdomen and pelvis were obtained without the intravenous administration of contrast with coronal reformatted images.    The bladder is enlarged with irregular wall thickening and prominent stranding of the adjacent fat. There is    High attenuation luminal content most consistent with bladder hemorrhage. This may be secondary to a hemorrhagic cystitis or a bleeding neoplasm which is not really identified. Recommend cystoscopy. There is moderate to severe right  hydroureteronephrosis   and mild left hydroureteronephrosis. Findings are likely secondary to mechanical obstruction.    There is bilateral lower lobe atelectasis with moderate pleural effusions. There is a pericardial effusion. The gallbladder is dilated with a moderate stone. The liver, spleen, pancreas, adrenal glands appear normal. The aorta and iliac arteries are   calcified. The bowel gas pattern is nonobstructive. The appendix is not visualized but there are no secondary signs suggest appendicitis.    Impression:    Irregular bladder wall thickening with adjacent fat stranding in luminal hemorrhage. Recommend cystoscopy    Bilateral hydroureteronephrosis suggesting mechanical obstruction    Bilateral pleural effusions, pericardial effusion and airspace disease    Cholelithiasis      Impression:       Authenticated by John Harper MD on 09/11/2017 09:38:46 PM    XR Pham OR Procedure [[id number]] Resulted:  09/13/17 1235     Updated:  09/13/17 1235          Condition on Discharge:      Stable vitals okay. Has had good by mouth intake. Intermittent confusion noted during the stay in the hospital. Patient has had a history of short-term memory deficit in the past has evidence of early Alzheimer-type dementia    Code status during the hospital stay:    Conditional Code    Discharge Disposition:    Skilled Nursing Facility (DC - External)    Discharge Medication:     Jose M Green   Home Medication Instructions KENY:[id number]    Printed on:09/14/17 0900   Medication Information                      atorvastatin (LIPITOR) 40 MG tablet  Take 1 tablet by mouth Daily.             cefuroxime (CEFTIN) 250 MG tablet  Take 1 tablet by mouth 2 (Two) Times a Day.              MG capsule  TAKE 1 CAPSULE BY MOUTH ONE TIME A DAY              latanoprost (XALATAN) 0.005 % ophthalmic solution  1 drop every night.             levothyroxine (SYNTHROID, LEVOTHROID) 100 MCG tablet  TAKE 1 TABLET BY MOUTH ONE  TIME A DAY              metoprolol tartrate (LOPRESSOR) 50 MG tablet  TAKE 1 TABLET BY MOUTH TWO TIMES A DAY              mirtazapine (REMERON) 30 MG tablet  TAKE 1 TABLET BY MOUTH IN THE EVENING              omeprazole (priLOSEC) 20 MG capsule  TAKE 1 CAPSULE BY MOUTH ONE TIME A DAY              oxybutynin XL (DITROPAN-XL) 10 MG 24 hr tablet  Take 1 tablet by mouth Daily.             traZODone (DESYREL) 50 MG tablet  TAKE 1 TABLET BY MOUTH AT BEDTIME AS NEEDED                 Discharge Diet: As tolerated        Activity at Discharge: May need rehabilitation and physical therapy and strengthening and gait training        Follow-up Appointments: With her urologist Dr. Mixon will need possible repeat cystoscopy      Follow-up with me as an outpatient in 2 weeks      Test Results Pending at Discharge:     Order Current Status    Tissue Pathology Exam In process    Blood Culture Preliminary result    Blood Culture Preliminary result             Speedy Bravo MD  09/14/17  9:00 AM    Time: Discharge 35 min           Electronically signed by Speedy Bravo MD at 9/14/2017  9:08 AM

## 2017-09-17 LAB
BACTERIA SPEC AEROBE CULT: NORMAL
BACTERIA SPEC AEROBE CULT: NORMAL

## 2017-09-18 ENCOUNTER — PATIENT OUTREACH (OUTPATIENT)
Dept: CASE MANAGEMENT | Facility: OTHER | Age: 82
End: 2017-09-18

## 2017-09-18 LAB
ABO + RH BLD: NORMAL
BH BB BLOOD EXPIRATION DATE: NORMAL
BH BB BLOOD TYPE BARCODE: 1700
BH BB DISPENSE STATUS: NORMAL
BH BB PRODUCT CODE: NORMAL
BH BB UNIT NUMBER: NORMAL
CROSSMATCH INTERPRETATION: NORMAL
UNIT  ABO: NORMAL
UNIT  RH: NORMAL

## 2017-09-18 NOTE — OUTREACH NOTE
Spoke with DIAMOND Perales at [facility] in [place].  Patient transferred there for rehab, PT, OT, ST, on Friday, 9-15-17.  ELOS unknown.  Plan is to return home upon DC.

## 2017-09-18 NOTE — THERAPY DISCHARGE NOTE
Acute Care - Occupational Therapy Discharge Summary  [facility]     Patient Name: Jose M Green  : 1931  MRN: [id number]    Today's Date: 2017  Onset of Illness/Injury or Date of Surgery Date: 17    Date of Referral to OT: 17  Referring Physician: Carol Ferrell DO      Admit Date: 2017        OT Recommendation and Plan    Visit Dx:    ICD-10-CM ICD-9-CM   1. Blood clot in bladder N32.89 596.7   2. Hydroureteronephrosis N13.30 591   3. Urinary tract infection, site unspecified N39.0    4. Impaired mobility and ADLs Z74.09 799.89   5. Impaired functional mobility, balance, gait, and endurance Z74.09 V49.89                     OT Goals       17 1512          Bed Mobility OT LTG    Bed Mobility OT LTG, Date Established 17  -      Bed Mobility OT LTG, Time to Achieve by discharge  -      Bed Mobility OT LTG, Activity Type supine to sit/sit to supine  -      Bed Mobility OT LTG, [place] Level contact guard assist  -      Bed Mobility OT LTG, Assist Device bed rails  -      Bed Mobility OT LTG, Outcome goal ongoing  -      Strength OT LTG    Strength Goal OT LTG, Date Established 17  -      Strength Goal OT LTG, Time to Achieve by discharge  -      Strength Goal OT LTG, Functional Goal Pt will participate in BUE strengthening ex to improve her independence with ADL tasks.  -      Strength Goal OT LTG, Outcome goal ongoing  -      LB Dressing OT LTG    LB Dressing Goal OT LTG, Date Established 17  -      LB Dressing Goal OT LTG, Time to Achieve by discharge  -      LB Dressing Goal OT LTG, [place] contact guard assist  -      LB Dressing Goal OT LTG, Outcome goal ongoing  -      Functional Mobility OT LTG    Functional Mobility Goal OT LTG, Date Established 17  -      Functional Mobility Goal OT LTG, Time to Achieve by discharge  -      Functional Mobility Goal OT LTG, [place] Level contact guard  -       Functional Mobility Goal OT LTG, Assist Device rolling walker  -      Functional Mobility Goal OT LTG, Distance to Achieve in hallway  -      Functional Mobility Goal OT LTG, Outcome goal ongoing  -        User Key  (r) = Recorded By, (t) = Taken By, (c) = Cosigned By    Initials Name Provider Type    NICA Mcmanus Occupational Therapist                  OT Discharge Summary  Anticipated Discharge Disposition: home with home health  Reason for Discharge: Discharge from facility  Outcomes Achieved: Unable to make functional progress toward goals at this time  Discharge Destination: Inpatient rehabilitation facility      Renita Mcmanus  9/18/2017

## 2017-09-19 NOTE — THERAPY DISCHARGE NOTE
Acute Care - Physical Therapy Discharge Summary  [facility]       Patient Name: Jose M Green  : 1931  MRN: [id number]    Today's Date: 2017  Onset of Illness/Injury or Date of Surgery Date: 17    Date of Referral to PT: 17  Referring Physician: Carol Ferrell DO      Admit Date: 2017      PT Recommendation and Plan    Visit Dx:    ICD-10-CM ICD-9-CM   1. Blood clot in bladder N32.89 596.7   2. Hydroureteronephrosis N13.30 591   3. Urinary tract infection, site unspecified N39.0    4. Impaired mobility and ADLs Z74.09 799.89   5. Impaired functional mobility, balance, gait, and endurance Z74.09 V49.89                       IP PT Goals       17 1720          Bed Mobility PT LTG    Bed Mobility PT LTG, Date Established 17  -      Bed Mobility PT LTG, Time to Achieve 2 wks  -JR      Bed Mobility PT LTG, Activity Type all bed mobility  -JR      Bed Mobility PT LTG, [place] Level supervision required  -JR      Transfer Training PT LTG    Transfer Training PT LTG, Date Established 17  -JR      Transfer Training PT LTG, Time to Achieve 2 wks  -JR      Transfer Training PT LTG, Activity Type sit to stand/stand to sit;bed to chair /chair to bed  -JR      Transfer Training PT LTG, [place] Level supervision required  -JR      Transfer Training PT LTG, Assist Device walker, rolling  -JR      Gait Training PT LTG    Gait Training Goal PT LTG, Date Established 17  -      Gait Training Goal PT LTG, Time to Achieve 2 wks  -JR      Gait Training Goal PT LTG, [place] Level contact guard assist  -JR      Gait Training Goal PT LTG, Assist Device walker, rolling  -JR      Gait Training Goal PT LTG, Distance to Achieve 250  -JR        User Key  (r) = Recorded By, (t) = Taken By, (c) = Cosigned By    Initials Name Provider Type    JR Jolene Garcia, PT Physical Therapist              PT Discharge Summary  Anticipated Discharge Disposition: home with home health,  home with assist  Reason for Discharge: Discharge from facility  Outcomes Achieved: Unable to make functional progress toward goals at this time  Discharge Destination: Home with home health      Jolene Garcia, PT   9/19/2017

## 2017-09-20 LAB
LAB AP CASE REPORT: NORMAL
Lab: NORMAL
PATH REPORT.FINAL DX SPEC: NORMAL

## 2017-09-21 ENCOUNTER — DOCUMENTATION (OUTPATIENT)
Dept: FAMILY MEDICINE CLINIC | Facility: CLINIC | Age: 82
End: 2017-09-21

## 2017-09-21 ENCOUNTER — OUTSIDE FACILITY SERVICE (OUTPATIENT)
Dept: FAMILY MEDICINE CLINIC | Facility: CLINIC | Age: 82
End: 2017-09-21

## 2017-09-21 PROCEDURE — 99305 1ST NF CARE MODERATE MDM 35: CPT | Performed by: INTERNAL MEDICINE

## 2017-09-26 ENCOUNTER — PATIENT OUTREACH (OUTPATIENT)
Dept: CASE MANAGEMENT | Facility: OTHER | Age: 82
End: 2017-09-26

## 2017-09-26 NOTE — OUTREACH NOTE
CA talked with Carla Piña, Business Office at [facility] [place].  Patient continues to make progress with therapy, under Medicare's skilled services.  ELOS unknown.  Plan is to return home upon DC.

## 2017-09-28 ENCOUNTER — OUTSIDE FACILITY SERVICE (OUTPATIENT)
Dept: FAMILY MEDICINE CLINIC | Facility: CLINIC | Age: 82
End: 2017-09-28

## 2017-09-28 PROCEDURE — 99308 SBSQ NF CARE LOW MDM 20: CPT | Performed by: INTERNAL MEDICINE

## 2017-10-03 ENCOUNTER — PATIENT OUTREACH (OUTPATIENT)
Dept: CASE MANAGEMENT | Facility: OTHER | Age: 82
End: 2017-10-03

## 2017-10-03 NOTE — OUTREACH NOTE
CA talked with DIAMOND Perales at [facility] [place].  She reported they met with family and P.T. , and the last Medicare covered day for therapy will be 10-4-17, and DC home is planned for 10-5-17.  [facility] is to follow.  Family has caregiver during the day with patient.

## 2017-10-10 ENCOUNTER — PATIENT OUTREACH (OUTPATIENT)
Dept: CASE MANAGEMENT | Facility: OTHER | Age: 82
End: 2017-10-10

## 2017-10-10 NOTE — OUTREACH NOTE
CA talked with Enedina, staff at [facility] [place].  Confirmed patient did DC home as planned on 10-5-17.  Per Latanya FIELDS, [facility] is to follow-up, and family has arranged caregivers during the day time.

## 2017-10-12 ENCOUNTER — EPISODE CHANGES (OUTPATIENT)
Dept: CASE MANAGEMENT | Facility: OTHER | Age: 82
End: 2017-10-12

## 2017-10-16 ENCOUNTER — OUTSIDE FACILITY SERVICE (OUTPATIENT)
Dept: INTERNAL MEDICINE | Facility: CLINIC | Age: 82
End: 2017-10-16

## 2017-10-16 PROCEDURE — G0180 MD CERTIFICATION HHA PATIENT: HCPCS | Performed by: INTERNAL MEDICINE

## 2017-10-19 ENCOUNTER — OFFICE VISIT (OUTPATIENT)
Dept: UROLOGY | Facility: CLINIC | Age: 82
End: 2017-10-19

## 2017-10-19 VITALS
HEART RATE: 66 BPM | WEIGHT: 134 LBS | HEIGHT: 64 IN | OXYGEN SATURATION: 98 % | BODY MASS INDEX: 22.88 KG/M2 | TEMPERATURE: 99 F

## 2017-10-19 DIAGNOSIS — N39.0 URINARY TRACT INFECTION WITH HEMATURIA, SITE UNSPECIFIED: Primary | ICD-10-CM

## 2017-10-19 DIAGNOSIS — R31.9 URINARY TRACT INFECTION WITH HEMATURIA, SITE UNSPECIFIED: Primary | ICD-10-CM

## 2017-10-19 LAB
BILIRUB BLD-MCNC: NEGATIVE MG/DL
CLARITY, POC: ABNORMAL
COLOR UR: YELLOW
GLUCOSE UR STRIP-MCNC: NEGATIVE MG/DL
KETONES UR QL: ABNORMAL
LEUKOCYTE EST, POC: ABNORMAL
NITRITE UR-MCNC: POSITIVE MG/ML
PH UR: 7 [PH] (ref 5–8)
PROT UR STRIP-MCNC: ABNORMAL MG/DL
RBC # UR STRIP: ABNORMAL /UL
SP GR UR: 1.01 (ref 1–1.03)
UROBILINOGEN UR QL: NORMAL

## 2017-10-19 PROCEDURE — 99214 OFFICE O/P EST MOD 30 MIN: CPT | Performed by: UROLOGY

## 2017-10-19 PROCEDURE — 81003 URINALYSIS AUTO W/O SCOPE: CPT | Performed by: UROLOGY

## 2017-10-19 RX ORDER — SULFAMETHOXAZOLE AND TRIMETHOPRIM 400; 80 MG/1; MG/1
1 TABLET ORAL 2 TIMES DAILY
Qty: 20 TABLET | Refills: 0 | Status: SHIPPED | OUTPATIENT
Start: 2017-10-19 | End: 2017-10-29

## 2017-10-19 NOTE — PROGRESS NOTES
Chief Complaint  Follow-up (PATIENT IS HERE FOR HOSPITAL FOLLOW UP)      SANKET Green is a 86 y.o.female who returns today for follow-up after recent hospitalization with urological consultation for gross hematuria.  She was on multiple blood thinners and was probably over coagulated at the time.  Cystoscopy suggested hemorrhagic cystitis and biopsies were taken to rule out cancer that were negative.  Unfortunately visualization was limited because of the hematuria and she needs a follow-up cystoscopy.  Her urine today is grossly clear but infected.    Vitals:    10/19/17 1432   Pulse: 66   Temp: 99 °F (37.2 °C)   SpO2: 98%       Past Medical History  Past Medical History:   Diagnosis Date   • Arthritis    • Chronic lymphocytic leukemia    • CLL (chronic lymphocytic leukemia)    • Disease of thyroid gland    • DVT (deep venous thrombosis)    • Fractures    • Gall stones    • Heart murmur    • Hypertension    • Inguinal hernia    • Kidney infection    • Stomach problems    • UTI (urinary tract infection)        Past Surgical History  Past Surgical History:   Procedure Laterality Date   • CARDIOVERSION     •  SECTION     • CYSTOSCOPY BLADDER BIOPSY N/A 2017    Procedure: CYSTOSCOPY BLADDER BIOPSY;  Surgeon: Obed Avalos MD;  Location: [facility];  Service:    • CYSTOSCOPY CYSTOGRAM N/A 2017    Procedure: CYSTOSCOPY CYSTOGRAM, EVACUATION OF BLADDER CLOTS;  Surgeon: Obed Avalos MD;  Location: [facility];  Service:    • HYSTERECTOMY     • OTHER SURGICAL HISTORY      Both Arm Plates   • VARICOSE VEIN SURGERY         Medications  has a current medication list which includes the following prescription(s): atorvastatin, dok, latanoprost, levothyroxine, metoprolol tartrate, mirtazapine, omeprazole, oxybutynin xl, and trazodone.    Allergies  Allergies   Allergen Reactions   • Codeine Nausea And Vomiting   • Contrast Dye Hives   • Aspirin Palpitations       Social History  Social History      Social History   • Marital status:      Spouse name: N/A   • Number of children: N/A   • Years of education: N/A     Occupational History   • Not on file.     Social History Main Topics   • Smoking status: Never Smoker   • Smokeless tobacco: Never Used   • Alcohol use No   • Drug use: No   • Sexual activity: Defer     Other Topics Concern   • Not on file     Social History Narrative       Family History  History reviewed. No pertinent family history.    Review of Systems  Review of Systems   Constitutional: Positive for chills and fever.   Genitourinary: Positive for dysuria, frequency, hematuria and urgency.   All other systems reviewed and are negative.      Physical Exam  Physical Exam    Labs recent and today in the office:  Results for orders placed or performed in visit on 10/19/17   POC Urinalysis Dipstick, Automated   Result Value Ref Range    Color Yellow Yellow, Straw, Dark Yellow, Tila    Clarity, UA Cloudy (A) Clear    Glucose, UA Negative Negative, 1000 mg/dL (3+) mg/dL    Bilirubin Negative Negative    Ketones, UA Trace (A) Negative    Specific Gravity  1.015 1.005 - 1.030    Blood, UA Trace (A) Negative    pH, Urine 7.0 5.0 - 8.0    Protein, POC 2+ (A) Negative mg/dL    Urobilinogen, UA Normal Normal    Leukocytes Large (3+) (A) Negative    Nitrite, UA Positive (A) Negative         Assessment & Plan  UTI with hematuria: Urine appears to be infected so a culture is submitted and she's placed on Bactrim.  She'll need to be contacted if there is a resistant organism but her latest positive culture was sensitive to that.  She'll return for cystoscopy to rule out bladder cancer.  She is complaining of constipation so she is switched from Ditropan to Myrbetrique for her overactive bladder.

## 2017-10-20 ENCOUNTER — OFFICE VISIT (OUTPATIENT)
Dept: INTERNAL MEDICINE | Facility: CLINIC | Age: 82
End: 2017-10-20

## 2017-10-20 VITALS
OXYGEN SATURATION: 99 % | HEIGHT: 64 IN | SYSTOLIC BLOOD PRESSURE: 140 MMHG | BODY MASS INDEX: 23 KG/M2 | HEART RATE: 95 BPM | DIASTOLIC BLOOD PRESSURE: 76 MMHG | TEMPERATURE: 98 F

## 2017-10-20 DIAGNOSIS — I10 ESSENTIAL HYPERTENSION: ICD-10-CM

## 2017-10-20 DIAGNOSIS — Z23 NEED FOR VACCINATION: ICD-10-CM

## 2017-10-20 DIAGNOSIS — I48.0 PAROXYSMAL ATRIAL FIBRILLATION (HCC): Primary | ICD-10-CM

## 2017-10-20 DIAGNOSIS — E03.9 ACQUIRED HYPOTHYROIDISM: ICD-10-CM

## 2017-10-20 PROCEDURE — 99214 OFFICE O/P EST MOD 30 MIN: CPT | Performed by: INTERNAL MEDICINE

## 2017-10-20 PROCEDURE — G0008 ADMIN INFLUENZA VIRUS VAC: HCPCS | Performed by: INTERNAL MEDICINE

## 2017-10-20 PROCEDURE — 90662 IIV NO PRSV INCREASED AG IM: CPT | Performed by: INTERNAL MEDICINE

## 2017-10-20 RX ORDER — LOSARTAN POTASSIUM AND HYDROCHLOROTHIAZIDE 12.5; 1 MG/1; MG/1
TABLET ORAL
Refills: 3 | COMMUNITY
Start: 2017-09-05 | End: 2018-01-01 | Stop reason: ALTCHOICE

## 2017-10-20 NOTE — PROGRESS NOTES
"Venice Green is a 86 y.o. female    HPI coming in for follow she is accompanied by a family member patient with recurring UTIs she has atrial fibrillation and coronary artery disease with hypertension. Lives by herself was recently in a rehabilitation facility. No alcohol or tobacco use    The following portions of the patient's history were reviewed and updated as appropriate: allergies, current medications, past family history, past medical history, past social history, past surgical history, and problem list.     Review of Systems   Constitutional: Positive for fatigue. Negative for activity change, appetite change and fever.   HENT: Negative for congestion, ear discharge, ear pain and trouble swallowing.    Eyes: Negative for photophobia and visual disturbance.   Respiratory: Negative for cough and shortness of breath.    Cardiovascular: Negative for chest pain and palpitations.   Gastrointestinal: Negative for abdominal distention, abdominal pain, constipation, diarrhea, nausea and vomiting.   Endocrine: Negative.    Genitourinary: Negative for dysuria, hematuria and urgency.   Musculoskeletal: Positive for arthralgias. Negative for back pain, joint swelling and myalgias.   Skin: Negative for color change and rash.   Allergic/Immunologic: Negative.    Neurological: Negative for dizziness, weakness, light-headedness and headaches.   Hematological: Negative for adenopathy. Does not bruise/bleed easily.   Psychiatric/Behavioral: Positive for decreased concentration, dysphoric mood and sleep disturbance. Negative for agitation and confusion. The patient is not nervous/anxious.        Visit Vitals   • /76   • Pulse 95   • Temp 98 °F (36.7 °C)   • Ht 64\" (162.6 cm)   • SpO2 99%   • BMI 23 kg/m2       Objective  Physical Exam   Constitutional: She is oriented to person, place, and time. She appears well-developed and well-nourished. No distress.   HENT:   Nose: Nose normal.   Mouth/Throat: " Oropharynx is clear and moist.   Eyes: Conjunctivae and EOM are normal. No scleral icterus.   Neck: No tracheal deviation present. No thyromegaly present.   Cardiovascular: Normal rate.  Exam reveals no friction rub.    No murmur heard.  Irregular ht. rate   Pulmonary/Chest: No respiratory distress. She has no wheezes. She has no rales.   Abdominal: Soft. She exhibits no distension and no mass. There is no tenderness. There is no guarding.   Musculoskeletal: Normal range of motion. She exhibits edema and deformity.   Lymphadenopathy:     She has no cervical adenopathy.   Neurological: She is alert and oriented to person, place, and time. She has normal reflexes. No cranial nerve deficit. Coordination normal.   Skin: Skin is warm and dry. No rash noted. No erythema.   Psychiatric: She has a normal mood and affect. Her behavior is normal. Judgment and thought content normal.       Diagnoses and all orders for this visit:    Paroxysmal atrial fibrillation continue with beta blockers for rate control okay not a good candidate for anticoagulant therapy secondary to severe gross hematuria recurring a. Is following up with urology for follow-up    Acquired hypothyroidism clinically euthyroid recent TSH okay    Essential hypertension. Stable with current meds and low-salt diet    Patient and family member present during discussion about looking for long-term placement .initially consider assisted living facility    Other orders  -     losartan-hydrochlorothiazide (HYZAAR) 100-12.5 MG per tablet; TAKE 1 TABLET BY MOUTH ONE TIME A DAY

## 2017-10-21 LAB
BACTERIA UR CULT: ABNORMAL
BACTERIA UR CULT: ABNORMAL
OTHER ANTIBIOTIC SUSC ISLT: ABNORMAL

## 2017-10-25 ENCOUNTER — TELEPHONE (OUTPATIENT)
Dept: INTERNAL MEDICINE | Facility: CLINIC | Age: 82
End: 2017-10-25

## 2017-10-25 NOTE — TELEPHONE ENCOUNTER
[facility] CALLED FOR AN ORDER FOR A WHEELCHAIR FOR PATIENT.    THE FAX NUMBER TO SEND THE ORDER -[phone]

## 2017-11-01 ENCOUNTER — TELEPHONE (OUTPATIENT)
Dept: INTERNAL MEDICINE | Facility: CLINIC | Age: 82
End: 2017-11-01

## 2017-11-01 NOTE — TELEPHONE ENCOUNTER
Dale from [facility] called requesting a prescription for a transport wheelchair for the patient.

## 2017-11-22 ENCOUNTER — PROCEDURE VISIT (OUTPATIENT)
Dept: UROLOGY | Facility: CLINIC | Age: 82
End: 2017-11-22

## 2017-11-22 VITALS
TEMPERATURE: 98.2 F | SYSTOLIC BLOOD PRESSURE: 115 MMHG | BODY MASS INDEX: 23.05 KG/M2 | HEIGHT: 64 IN | WEIGHT: 135 LBS | HEART RATE: 71 BPM | DIASTOLIC BLOOD PRESSURE: 74 MMHG | OXYGEN SATURATION: 94 %

## 2017-11-22 DIAGNOSIS — R31.9 HEMATURIA, UNSPECIFIED TYPE: Primary | ICD-10-CM

## 2017-11-22 LAB
BILIRUB BLD-MCNC: NEGATIVE MG/DL
CLARITY, POC: CLEAR
COLOR UR: YELLOW
GLUCOSE UR STRIP-MCNC: NEGATIVE MG/DL
KETONES UR QL: NEGATIVE
LEUKOCYTE EST, POC: ABNORMAL
NITRITE UR-MCNC: NEGATIVE MG/ML
PH UR: 6 [PH] (ref 5–8)
PROT UR STRIP-MCNC: ABNORMAL MG/DL
RBC # UR STRIP: ABNORMAL /UL
SP GR UR: 1.02 (ref 1–1.03)
UROBILINOGEN UR QL: NORMAL

## 2017-11-22 PROCEDURE — 99213 OFFICE O/P EST LOW 20 MIN: CPT | Performed by: UROLOGY

## 2017-11-22 PROCEDURE — 52265 CYSTOSCOPY AND TREATMENT: CPT | Performed by: UROLOGY

## 2017-11-22 PROCEDURE — 81003 URINALYSIS AUTO W/O SCOPE: CPT | Performed by: UROLOGY

## 2017-11-22 RX ORDER — CLOPIDOGREL BISULFATE 75 MG/1
TABLET ORAL
COMMUNITY
Start: 2017-10-05 | End: 2018-01-01 | Stop reason: ALTCHOICE

## 2017-11-22 RX ORDER — APIXABAN 2.5 MG/1
TABLET, FILM COATED ORAL
COMMUNITY
Start: 2017-10-05 | End: 2018-01-01 | Stop reason: ALTCHOICE

## 2017-11-22 RX ORDER — ESTRADIOL 0.1 MG/G
CREAM VAGINAL
COMMUNITY
Start: 2017-10-12 | End: 2018-01-01 | Stop reason: ALTCHOICE

## 2017-11-22 RX ORDER — ASPIRIN 81 MG/1
TABLET ORAL
Refills: 3 | COMMUNITY
Start: 2017-10-26 | End: 2018-01-01 | Stop reason: ALTCHOICE

## 2017-11-22 RX ORDER — TOLTERODINE 4 MG/1
CAPSULE, EXTENDED RELEASE ORAL
COMMUNITY
Start: 2017-09-11 | End: 2018-01-01 | Stop reason: ALTCHOICE

## 2017-11-22 NOTE — PROGRESS NOTES
Chief Complaint  Blood in Urine (cysto in office for gross hematuria)      SANKET Green is a 86 y.o.female who returns today for follow-up after gross hematuria apparently from hemorrhagic cystitis.  Her urine was infected last week so she was given additional round of antibiotics and returns today for follow-up cystoscopy.  Previous study in the hospital included biopsies that were negative for cancer but poor visualization because of the hematuria.  Fortunately she's now been able to stop anticoagulation therapy except for daily aspirin.  She has a significant problem with incontinence handled with diapers and somewhat improved on Myrbetrique.    Vitals:    17 1039   BP: 115/74   Pulse: 71   Temp: 98.2 °F (36.8 °C)   SpO2: 94%       Past Medical History  Past Medical History:   Diagnosis Date   • Arthritis    • Chronic lymphocytic leukemia    • CLL (chronic lymphocytic leukemia)    • Disease of thyroid gland    • DVT (deep venous thrombosis)    • Fractures    • Gall stones    • Heart murmur    • Hypertension    • Inguinal hernia    • Kidney infection    • Stomach problems    • UTI (urinary tract infection)        Past Surgical History  Past Surgical History:   Procedure Laterality Date   • CARDIOVERSION     •  SECTION     • CYSTOSCOPY BLADDER BIOPSY N/A 2017    Procedure: CYSTOSCOPY BLADDER BIOPSY;  Surgeon: Obed Avalos MD;  Location: [facility] OR;  Service:    • CYSTOSCOPY CYSTOGRAM N/A 2017    Procedure: CYSTOSCOPY CYSTOGRAM, EVACUATION OF BLADDER CLOTS;  Surgeon: Obed Avalos MD;  Location: [facility];  Service:    • HYSTERECTOMY     • OTHER SURGICAL HISTORY      Both Arm Plates   • VARICOSE VEIN SURGERY         Medications  has a current medication list which includes the following prescription(s): aspir-low, atorvastatin, clopidogrel, dok, eliquis, estrace vaginal, latanoprost, levothyroxine, losartan-hydrochlorothiazide, metoprolol tartrate, mirtazapine, omeprazole,  oxybutynin xl, tolterodine la, and trazodone.    Allergies  Allergies   Allergen Reactions   • Codeine Nausea And Vomiting   • Contrast Dye Hives   • Aspirin Palpitations       Social History  Social History     Social History   • Marital status:      Spouse name: N/A   • Number of children: N/A   • Years of education: N/A     Occupational History   • Not on file.     Social History Main Topics   • Smoking status: Never Smoker   • Smokeless tobacco: Never Used   • Alcohol use No   • Drug use: No   • Sexual activity: Defer     Other Topics Concern   • Not on file     Social History Narrative       Family History  No family history on file.    Review of Systems  Review of Systems    Physical Exam  Physical Exam   Constitutional: She is oriented to person, place, and time. She appears well-developed and well-nourished.   HENT:   Head: Normocephalic.   Eyes: EOM are normal. Pupils are equal, round, and reactive to light.   Neck: Normal range of motion. Neck supple.   Cardiovascular: Normal rate, regular rhythm and normal heart sounds.    Pulmonary/Chest: Effort normal.   Abdominal: Soft. Bowel sounds are normal.   Genitourinary: Vagina normal.             Neurological: She is alert and oriented to person, place, and time.   Skin: Skin is warm and dry.   Psychiatric: She has a normal mood and affect.       Labs recent and today in the office:  Results for orders placed or performed in visit on 11/22/17   POC Urinalysis Dipstick, Automated   Result Value Ref Range    Color Yellow Yellow, Straw, Dark Yellow, Tila    Clarity, UA Clear Clear    Glucose, UA Negative Negative, 1000 mg/dL (3+) mg/dL    Bilirubin Negative Negative    Ketones, UA Negative Negative    Specific Gravity  1.025 1.005 - 1.030    Blood, UA 2+ (A) Negative    pH, Urine 6.0 5.0 - 8.0    Protein, POC 2+ (A) Negative mg/dL    Urobilinogen, UA Normal Normal    Leukocytes Moderate (2+) (A) Negative    Nitrite, UA Negative Negative      Female  cystoscopy:     The patient is prepped and draped in the dorsal lithotomy position in a routine sterile fashion. The vulva and urethral meatus are inspected and found to be normal. The panendoscope is inserted in the bladder which is visualized with the Foroblique and 70 degree lenses and found to be free of foreign bodies and mucosal lesions. Ureteral orifices are normal location and effluxing clear urine bilaterally.  The post void residual was only about 2 ounces of clear urine that is submitted for culture.  The interior the bladder is significantly inflamed but there are no obvious tumors present and minimal hyperplastic changes of the mucosa.  After distention the bladder bleeds almost suggesting interstitial cystitis.    Assessment & Plan  Gross hematuria: No obvious signs of bladder cancer but a urine for cytology is submitted and she will return in 3 months for follow-up.  Her incontinence is mixed and slightly improved on Myrbetrique.  She's not an operative candidate and so will definitely need to wear diapers.  If we can keep her free of infection hopefully she'll have less hematuria now that she is off anticoagulation therapy

## 2017-11-26 LAB
BACTERIA UR CULT: ABNORMAL
BACTERIA UR CULT: ABNORMAL
OTHER ANTIBIOTIC SUSC ISLT: ABNORMAL

## 2017-11-29 DIAGNOSIS — N30.01 ACUTE CYSTITIS WITH HEMATURIA: Primary | ICD-10-CM

## 2017-11-29 RX ORDER — NITROFURANTOIN 25; 75 MG/1; MG/1
100 CAPSULE ORAL 2 TIMES DAILY
Qty: 14 CAPSULE | Refills: 0 | Status: SHIPPED | OUTPATIENT
Start: 2017-11-29 | End: 2017-12-06

## 2017-12-08 ENCOUNTER — OUTSIDE FACILITY SERVICE (OUTPATIENT)
Dept: INTERNAL MEDICINE | Facility: CLINIC | Age: 82
End: 2017-12-08

## 2017-12-08 PROCEDURE — G0179 MD RECERTIFICATION HHA PT: HCPCS | Performed by: INTERNAL MEDICINE

## 2017-12-18 RX ORDER — METOPROLOL TARTRATE 50 MG/1
TABLET, FILM COATED ORAL
Qty: 180 TABLET | Refills: 3 | Status: SHIPPED | OUTPATIENT
Start: 2017-12-18 | End: 2018-01-01 | Stop reason: SDUPTHER

## 2017-12-19 ENCOUNTER — LAB REQUISITION (OUTPATIENT)
Dept: LAB | Facility: HOSPITAL | Age: 82
End: 2017-12-19

## 2017-12-19 DIAGNOSIS — N39.0 URINARY TRACT INFECTION: ICD-10-CM

## 2017-12-19 LAB
BACTERIA UR QL AUTO: ABNORMAL /HPF
BILIRUB UR QL STRIP: ABNORMAL
CLARITY UR: ABNORMAL
COLOR UR: ABNORMAL
GLUCOSE UR STRIP-MCNC: ABNORMAL MG/DL
HGB UR QL STRIP.AUTO: ABNORMAL
HYALINE CASTS UR QL AUTO: ABNORMAL /LPF
KETONES UR QL STRIP: ABNORMAL
LEUKOCYTE ESTERASE UR QL STRIP.AUTO: ABNORMAL
NITRITE UR QL STRIP: POSITIVE
PH UR STRIP.AUTO: 5.5 [PH] (ref 5–8)
PROT UR QL STRIP: ABNORMAL
RBC # UR: ABNORMAL /HPF
REF LAB TEST METHOD: ABNORMAL
SP GR UR STRIP: 1.01 (ref 1–1.03)
SQUAMOUS #/AREA URNS HPF: ABNORMAL /HPF
UROBILINOGEN UR QL STRIP: ABNORMAL
WBC UR QL AUTO: ABNORMAL /HPF

## 2017-12-19 PROCEDURE — 87186 SC STD MICRODIL/AGAR DIL: CPT | Performed by: UROLOGY

## 2017-12-19 PROCEDURE — 87086 URINE CULTURE/COLONY COUNT: CPT | Performed by: UROLOGY

## 2017-12-19 PROCEDURE — 87077 CULTURE AEROBIC IDENTIFY: CPT | Performed by: UROLOGY

## 2017-12-19 PROCEDURE — 81001 URINALYSIS AUTO W/SCOPE: CPT | Performed by: UROLOGY

## 2017-12-21 LAB
BACTERIA SPEC AEROBE CULT: ABNORMAL
BACTERIA SPEC AEROBE CULT: ABNORMAL

## 2017-12-22 DIAGNOSIS — N30.01 ACUTE CYSTITIS WITH HEMATURIA: Primary | ICD-10-CM

## 2017-12-22 RX ORDER — SULFAMETHOXAZOLE AND TRIMETHOPRIM 800; 160 MG/1; MG/1
1 TABLET ORAL 2 TIMES DAILY
Qty: 14 TABLET | Refills: 1 | Status: SHIPPED | OUTPATIENT
Start: 2017-12-22 | End: 2018-01-01

## 2018-01-01 ENCOUNTER — EPISODE CHANGES (OUTPATIENT)
Dept: CASE MANAGEMENT | Facility: OTHER | Age: 83
End: 2018-01-01

## 2018-01-01 ENCOUNTER — HOSPITAL ENCOUNTER (EMERGENCY)
Facility: HOSPITAL | Age: 83
Discharge: HOME | End: 2018-07-15
Attending: EMERGENCY MEDICINE | Admitting: EMERGENCY MEDICINE

## 2018-01-01 ENCOUNTER — OUTSIDE FACILITY SERVICE (OUTPATIENT)
Dept: INTERNAL MEDICINE | Facility: CLINIC | Age: 83
End: 2018-01-01

## 2018-01-01 ENCOUNTER — OFFICE VISIT (OUTPATIENT)
Dept: INTERNAL MEDICINE | Facility: CLINIC | Age: 83
End: 2018-01-01

## 2018-01-01 ENCOUNTER — TELEPHONE (OUTPATIENT)
Dept: INTERNAL MEDICINE | Facility: CLINIC | Age: 83
End: 2018-01-01

## 2018-01-01 ENCOUNTER — APPOINTMENT (OUTPATIENT)
Dept: GENERAL RADIOLOGY | Facility: HOSPITAL | Age: 83
End: 2018-01-01

## 2018-01-01 VITALS
SYSTOLIC BLOOD PRESSURE: 150 MMHG | TEMPERATURE: 98.5 F | DIASTOLIC BLOOD PRESSURE: 90 MMHG | BODY MASS INDEX: 21.85 KG/M2 | HEART RATE: 77 BPM | WEIGHT: 128 LBS | HEIGHT: 64 IN | OXYGEN SATURATION: 97 %

## 2018-01-01 VITALS
OXYGEN SATURATION: 97 % | HEART RATE: 71 BPM | DIASTOLIC BLOOD PRESSURE: 77 MMHG | TEMPERATURE: 98.5 F | HEIGHT: 66 IN | RESPIRATION RATE: 16 BRPM | BODY MASS INDEX: 19.93 KG/M2 | SYSTOLIC BLOOD PRESSURE: 188 MMHG | WEIGHT: 124 LBS

## 2018-01-01 VITALS
TEMPERATURE: 98.4 F | HEART RATE: 68 BPM | BODY MASS INDEX: 21.17 KG/M2 | HEIGHT: 64 IN | SYSTOLIC BLOOD PRESSURE: 130 MMHG | DIASTOLIC BLOOD PRESSURE: 70 MMHG | OXYGEN SATURATION: 97 % | WEIGHT: 124 LBS

## 2018-01-01 VITALS
BODY MASS INDEX: 18.8 KG/M2 | TEMPERATURE: 97.1 F | HEIGHT: 66 IN | SYSTOLIC BLOOD PRESSURE: 120 MMHG | DIASTOLIC BLOOD PRESSURE: 70 MMHG | OXYGEN SATURATION: 95 % | HEART RATE: 58 BPM | WEIGHT: 117 LBS

## 2018-01-01 DIAGNOSIS — E03.9 ACQUIRED HYPOTHYROIDISM: Primary | ICD-10-CM

## 2018-01-01 DIAGNOSIS — I10 ESSENTIAL HYPERTENSION: ICD-10-CM

## 2018-01-01 DIAGNOSIS — I48.0 PAROXYSMAL ATRIAL FIBRILLATION (HCC): ICD-10-CM

## 2018-01-01 DIAGNOSIS — I25.10 CORONARY ARTERY DISEASE INVOLVING NATIVE CORONARY ARTERY OF NATIVE HEART WITHOUT ANGINA PECTORIS: Primary | ICD-10-CM

## 2018-01-01 DIAGNOSIS — I48.20 CHRONIC ATRIAL FIBRILLATION (HCC): Primary | ICD-10-CM

## 2018-01-01 DIAGNOSIS — Z23 NEED FOR VACCINATION: ICD-10-CM

## 2018-01-01 DIAGNOSIS — E03.9 ACQUIRED HYPOTHYROIDISM: ICD-10-CM

## 2018-01-01 DIAGNOSIS — C91.10 CHRONIC LYMPHOCYTIC LEUKEMIA (HCC): Chronic | ICD-10-CM

## 2018-01-01 DIAGNOSIS — N39.0 ACUTE UTI: Primary | ICD-10-CM

## 2018-01-01 DIAGNOSIS — N30.10 INTERSTITIAL CYSTITIS: ICD-10-CM

## 2018-01-01 DIAGNOSIS — I25.10 CORONARY ARTERY DISEASE INVOLVING NATIVE HEART WITHOUT ANGINA PECTORIS, UNSPECIFIED VESSEL OR LESION TYPE: ICD-10-CM

## 2018-01-01 DIAGNOSIS — Z23 NEED FOR IMMUNIZATION AGAINST INFLUENZA: Primary | ICD-10-CM

## 2018-01-01 LAB
ALBUMIN SERPL-MCNC: 4 G/DL (ref 3.5–5)
ALBUMIN SERPL-MCNC: 4.1 G/DL (ref 3.5–5)
ALBUMIN/GLOB SERPL: 1.5 G/DL (ref 1–2)
ALBUMIN/GLOB SERPL: 1.5 G/DL (ref 1–2)
ALP SERPL-CCNC: 244 U/L (ref 38–126)
ALP SERPL-CCNC: 295 U/L (ref 38–126)
ALT SERPL W P-5'-P-CCNC: 42 U/L (ref 13–69)
ALT SERPL-CCNC: 36 U/L (ref 13–69)
ANION GAP SERPL CALCULATED.3IONS-SCNC: 13.1 MMOL/L (ref 10–20)
AST SERPL-CCNC: 33 U/L (ref 15–46)
AST SERPL-CCNC: 50 U/L (ref 15–46)
BACTERIA UR QL AUTO: ABNORMAL /HPF
BASOPHILS # BLD AUTO: 0.03 10*3/MM3 (ref 0–0.2)
BASOPHILS NFR BLD AUTO: 0.4 % (ref 0–2.5)
BILIRUB SERPL-MCNC: 0.5 MG/DL (ref 0.2–1.3)
BILIRUB SERPL-MCNC: 0.7 MG/DL (ref 0.2–1.3)
BILIRUB UR QL STRIP: NEGATIVE
BUN BLD-MCNC: 23 MG/DL (ref 7–20)
BUN SERPL-MCNC: 22 MG/DL (ref 7–20)
BUN/CREAT SERPL: 24.4 (ref 7.1–23.5)
BUN/CREAT SERPL: 28.8 (ref 7.1–23.5)
CALCIUM SERPL-MCNC: 9.3 MG/DL (ref 8.4–10.2)
CALCIUM SPEC-SCNC: 9.5 MG/DL (ref 8.4–10.2)
CHLORIDE SERPL-SCNC: 98 MMOL/L (ref 98–107)
CHLORIDE SERPL-SCNC: 98 MMOL/L (ref 98–107)
CLARITY UR: ABNORMAL
CO2 SERPL-SCNC: 26 MMOL/L (ref 26–30)
CO2 SERPL-SCNC: 30 MMOL/L (ref 26–30)
COLOR UR: YELLOW
CREAT BLD-MCNC: 0.8 MG/DL (ref 0.6–1.3)
CREAT SERPL-MCNC: 0.9 MG/DL (ref 0.6–1.3)
DEPRECATED RDW RBC AUTO: 50.9 FL (ref 37–54)
EOSINOPHIL # BLD AUTO: 0.38 10*3/MM3 (ref 0–0.7)
EOSINOPHIL NFR BLD AUTO: 5.2 % (ref 0–7)
ERYTHROCYTE [DISTWIDTH] IN BLOOD BY AUTOMATED COUNT: 15.4 % (ref 11.5–14.5)
ERYTHROCYTE [DISTWIDTH] IN BLOOD BY AUTOMATED COUNT: 16.4 % (ref 11.5–14.5)
GFR SERPL CREATININE-BSD FRML MDRD: 68 ML/MIN/1.73
GFR SERPLBLD CREATININE-BSD FMLA CKD-EPI: 59 ML/MIN/1.73
GFR SERPLBLD CREATININE-BSD FMLA CKD-EPI: 72 ML/MIN/1.73
GLOBULIN SER CALC-MCNC: 2.7 GM/DL
GLOBULIN UR ELPH-MCNC: 2.8 GM/DL
GLUCOSE BLD-MCNC: 92 MG/DL (ref 74–98)
GLUCOSE SERPL-MCNC: 104 MG/DL (ref 74–98)
GLUCOSE UR STRIP-MCNC: NEGATIVE MG/DL
HCT VFR BLD AUTO: 36.7 % (ref 37–47)
HCT VFR BLD AUTO: 38.4 % (ref 37–47)
HGB BLD-MCNC: 11.3 G/DL (ref 12–16)
HGB BLD-MCNC: 11.7 G/DL (ref 12–16)
HGB UR QL STRIP.AUTO: ABNORMAL
HYALINE CASTS UR QL AUTO: ABNORMAL /LPF
IMM GRANULOCYTES # BLD: 0.03 10*3/MM3 (ref 0–0.06)
IMM GRANULOCYTES NFR BLD: 0.4 % (ref 0–0.6)
KETONES UR QL STRIP: NEGATIVE
LEUKOCYTE ESTERASE UR QL STRIP.AUTO: ABNORMAL
LYMPHOCYTES # BLD AUTO: 3.89 10*3/MM3 (ref 0.6–3.4)
LYMPHOCYTES NFR BLD AUTO: 52.9 % (ref 10–50)
MCH RBC QN AUTO: 25.8 PG (ref 27–31)
MCH RBC QN AUTO: 27.2 PG (ref 27–31)
MCHC RBC AUTO-ENTMCNC: 30.5 G/DL (ref 30–37)
MCHC RBC AUTO-ENTMCNC: 30.8 G/DL (ref 30–37)
MCV RBC AUTO: 84.8 FL (ref 81–99)
MCV RBC AUTO: 88.4 FL (ref 81–99)
MONOCYTES # BLD AUTO: 0.63 10*3/MM3 (ref 0–0.9)
MONOCYTES NFR BLD AUTO: 8.6 % (ref 0–12)
MUCOUS THREADS URNS QL MICRO: ABNORMAL /HPF
NEUTROPHILS # BLD AUTO: 2.39 10*3/MM3 (ref 2–6.9)
NEUTROPHILS NFR BLD AUTO: 32.5 % (ref 37–80)
NITRITE UR QL STRIP: NEGATIVE
NRBC BLD MANUAL-RTO: 0 /100 WBC (ref 0–0)
PH UR STRIP.AUTO: 7 [PH] (ref 5–8)
PLATELET # BLD AUTO: 104 10*3/MM3 (ref 130–400)
PLATELET # BLD AUTO: 153 10*3/MM3 (ref 130–400)
PMV BLD AUTO: 11.6 FL (ref 6–12)
POTASSIUM BLD-SCNC: 4.1 MMOL/L (ref 3.5–5.1)
POTASSIUM SERPL-SCNC: 4.8 MMOL/L (ref 3.5–5.1)
PROT SERPL-MCNC: 6.7 G/DL (ref 6.3–8.2)
PROT SERPL-MCNC: 6.9 G/DL (ref 6.3–8.2)
PROT UR QL STRIP: ABNORMAL
RBC # BLD AUTO: 4.15 10*6/MM3 (ref 4.2–5.4)
RBC # BLD AUTO: 4.53 10*6/MM3 (ref 4.2–5.4)
RBC # UR: ABNORMAL /HPF
REF LAB TEST METHOD: ABNORMAL
SODIUM BLD-SCNC: 137 MMOL/L (ref 137–145)
SODIUM SERPL-SCNC: 136 MMOL/L (ref 137–145)
SP GR UR STRIP: 1.02 (ref 1–1.03)
SQUAMOUS #/AREA URNS HPF: ABNORMAL /HPF
T4 FREE SERPL-MCNC: 1.66 NG/DL (ref 0.78–2.19)
TSH SERPL DL<=0.005 MIU/L-ACNC: 2.93 MIU/ML (ref 0.47–4.68)
UROBILINOGEN UR QL STRIP: ABNORMAL
WBC # BLD AUTO: 7.85 10*3/MM3 (ref 4.8–10.8)
WBC NRBC COR # BLD: 7.35 10*3/MM3 (ref 4.8–10.8)
WBC UR QL AUTO: ABNORMAL /HPF

## 2018-01-01 PROCEDURE — 90670 PCV13 VACCINE IM: CPT | Performed by: INTERNAL MEDICINE

## 2018-01-01 PROCEDURE — 81001 URINALYSIS AUTO W/SCOPE: CPT | Performed by: EMERGENCY MEDICINE

## 2018-01-01 PROCEDURE — G0180 MD CERTIFICATION HHA PATIENT: HCPCS | Performed by: INTERNAL MEDICINE

## 2018-01-01 PROCEDURE — G0179 MD RECERTIFICATION HHA PT: HCPCS | Performed by: INTERNAL MEDICINE

## 2018-01-01 PROCEDURE — 36415 COLL VENOUS BLD VENIPUNCTURE: CPT

## 2018-01-01 PROCEDURE — G0438 PPPS, INITIAL VISIT: HCPCS | Performed by: INTERNAL MEDICINE

## 2018-01-01 PROCEDURE — 99214 OFFICE O/P EST MOD 30 MIN: CPT | Performed by: INTERNAL MEDICINE

## 2018-01-01 PROCEDURE — G0009 ADMIN PNEUMOCOCCAL VACCINE: HCPCS | Performed by: INTERNAL MEDICINE

## 2018-01-01 PROCEDURE — 85025 COMPLETE CBC W/AUTO DIFF WBC: CPT | Performed by: PHYSICIAN ASSISTANT

## 2018-01-01 PROCEDURE — 71045 X-RAY EXAM CHEST 1 VIEW: CPT

## 2018-01-01 PROCEDURE — 90662 IIV NO PRSV INCREASED AG IM: CPT | Performed by: INTERNAL MEDICINE

## 2018-01-01 PROCEDURE — 93005 ELECTROCARDIOGRAM TRACING: CPT | Performed by: EMERGENCY MEDICINE

## 2018-01-01 PROCEDURE — 80053 COMPREHEN METABOLIC PANEL: CPT | Performed by: PHYSICIAN ASSISTANT

## 2018-01-01 PROCEDURE — 99283 EMERGENCY DEPT VISIT LOW MDM: CPT

## 2018-01-01 PROCEDURE — G0008 ADMIN INFLUENZA VIRUS VAC: HCPCS | Performed by: INTERNAL MEDICINE

## 2018-01-01 RX ORDER — ASPIRIN 81 MG/1
81 TABLET ORAL DAILY
Start: 2018-01-01

## 2018-01-01 RX ORDER — LEVOTHYROXINE SODIUM 0.1 MG/1
100 TABLET ORAL DAILY
Qty: 90 TABLET | Refills: 2 | Status: SHIPPED | OUTPATIENT
Start: 2018-01-01 | End: 2019-01-01 | Stop reason: HOSPADM

## 2018-01-01 RX ORDER — DOCUSATE SODIUM 100 MG/1
100 CAPSULE, LIQUID FILLED ORAL 2 TIMES DAILY PRN
Qty: 90 CAPSULE | Refills: 3 | Status: SHIPPED | OUTPATIENT
Start: 2018-01-01

## 2018-01-01 RX ORDER — METOPROLOL TARTRATE 50 MG/1
50 TABLET, FILM COATED ORAL 2 TIMES DAILY
Qty: 180 TABLET | Refills: 3 | Status: SHIPPED | OUTPATIENT
Start: 2018-01-01

## 2018-01-01 RX ORDER — CEPHALEXIN 500 MG/1
500 CAPSULE ORAL 3 TIMES DAILY
Qty: 21 CAPSULE | Refills: 0 | Status: SHIPPED | OUTPATIENT
Start: 2018-01-01 | End: 2018-01-01

## 2018-01-01 RX ORDER — OMEPRAZOLE 20 MG/1
20 CAPSULE, DELAYED RELEASE ORAL DAILY
Qty: 30 CAPSULE | Refills: 11 | Status: SHIPPED | OUTPATIENT
Start: 2018-01-01

## 2018-01-01 RX ORDER — DOCUSATE SODIUM 100 MG/1
CAPSULE, LIQUID FILLED ORAL
Qty: 30 CAPSULE | Refills: 4 | Status: SHIPPED | OUTPATIENT
Start: 2018-01-01 | End: 2018-01-01 | Stop reason: SDUPTHER

## 2018-01-01 RX ORDER — ATORVASTATIN CALCIUM 40 MG/1
40 TABLET, FILM COATED ORAL DAILY
Qty: 90 TABLET | Refills: 3 | Status: SHIPPED | OUTPATIENT
Start: 2018-01-01

## 2018-01-01 RX ORDER — MIRTAZAPINE 30 MG/1
30 TABLET, FILM COATED ORAL EVERY EVENING
Qty: 30 TABLET | Refills: 4 | Status: SHIPPED | OUTPATIENT
Start: 2018-01-01 | End: 2019-01-01 | Stop reason: SDUPTHER

## 2018-01-01 RX ORDER — CIPROFLOXACIN 500 MG/1
500 TABLET, FILM COATED ORAL
Qty: 3 TABLET | Refills: 0 | Status: SHIPPED | OUTPATIENT
Start: 2018-01-01 | End: 2018-01-01

## 2018-01-05 ENCOUNTER — LAB REQUISITION (OUTPATIENT)
Dept: LAB | Facility: HOSPITAL | Age: 83
End: 2018-01-05

## 2018-01-05 DIAGNOSIS — Z87.440 PERSONAL HISTORY OF URINARY INFECTION: ICD-10-CM

## 2018-01-05 LAB
BACTERIA UR QL AUTO: ABNORMAL /HPF
BILIRUB UR QL STRIP: NEGATIVE
CLARITY UR: ABNORMAL
COLOR UR: YELLOW
GLUCOSE UR STRIP-MCNC: NEGATIVE MG/DL
HGB UR QL STRIP.AUTO: ABNORMAL
HYALINE CASTS UR QL AUTO: ABNORMAL /LPF
KETONES UR QL STRIP: NEGATIVE
LEUKOCYTE ESTERASE UR QL STRIP.AUTO: ABNORMAL
NITRITE UR QL STRIP: NEGATIVE
PH UR STRIP.AUTO: 6.5 [PH] (ref 5–8)
PROT UR QL STRIP: ABNORMAL
RBC # UR: ABNORMAL /HPF
REF LAB TEST METHOD: ABNORMAL
SP GR UR STRIP: 1.02 (ref 1–1.03)
SQUAMOUS #/AREA URNS HPF: ABNORMAL /HPF
UROBILINOGEN UR QL STRIP: ABNORMAL
WBC UR QL AUTO: ABNORMAL /HPF

## 2018-01-05 PROCEDURE — 81001 URINALYSIS AUTO W/SCOPE: CPT | Performed by: UROLOGY

## 2018-01-05 PROCEDURE — 87086 URINE CULTURE/COLONY COUNT: CPT | Performed by: UROLOGY

## 2018-01-05 PROCEDURE — 87147 CULTURE TYPE IMMUNOLOGIC: CPT | Performed by: UROLOGY

## 2018-01-05 PROCEDURE — 87077 CULTURE AEROBIC IDENTIFY: CPT | Performed by: UROLOGY

## 2018-01-05 PROCEDURE — 87186 SC STD MICRODIL/AGAR DIL: CPT | Performed by: UROLOGY

## 2018-01-08 LAB
BACTERIA SPEC AEROBE CULT: ABNORMAL
BACTERIA SPEC AEROBE CULT: ABNORMAL

## 2018-01-17 RX ORDER — MIRTAZAPINE 30 MG/1
TABLET, FILM COATED ORAL
Qty: 30 TABLET | Refills: 4 | Status: SHIPPED | OUTPATIENT
Start: 2018-01-17 | End: 2018-01-01 | Stop reason: SDUPTHER

## 2018-02-08 ENCOUNTER — OUTSIDE FACILITY SERVICE (OUTPATIENT)
Dept: INTERNAL MEDICINE | Facility: CLINIC | Age: 83
End: 2018-02-08

## 2018-02-08 PROCEDURE — G0179 MD RECERTIFICATION HHA PT: HCPCS | Performed by: INTERNAL MEDICINE

## 2018-02-22 ENCOUNTER — OFFICE VISIT (OUTPATIENT)
Dept: UROLOGY | Facility: CLINIC | Age: 83
End: 2018-02-22

## 2018-02-22 VITALS
HEIGHT: 64 IN | WEIGHT: 135 LBS | TEMPERATURE: 97.9 F | SYSTOLIC BLOOD PRESSURE: 118 MMHG | HEART RATE: 63 BPM | OXYGEN SATURATION: 98 % | DIASTOLIC BLOOD PRESSURE: 70 MMHG | BODY MASS INDEX: 23.05 KG/M2

## 2018-02-22 DIAGNOSIS — N30.10 CHRONIC INTERSTITIAL CYSTITIS: ICD-10-CM

## 2018-02-22 DIAGNOSIS — N39.3 SUI (STRESS URINARY INCONTINENCE, FEMALE): ICD-10-CM

## 2018-02-22 DIAGNOSIS — Z87.448 HISTORY OF HEMATURIA: Primary | ICD-10-CM

## 2018-02-22 LAB
BILIRUB BLD-MCNC: NEGATIVE MG/DL
CLARITY, POC: CLEAR
COLOR UR: YELLOW
GLUCOSE UR STRIP-MCNC: NEGATIVE MG/DL
KETONES UR QL: NEGATIVE
LEUKOCYTE EST, POC: NEGATIVE
NITRITE UR-MCNC: NEGATIVE MG/ML
PH UR: 6 [PH] (ref 5–8)
PROT UR STRIP-MCNC: NEGATIVE MG/DL
RBC # UR STRIP: NEGATIVE /UL
SP GR UR: 1.01 (ref 1–1.03)
UROBILINOGEN UR QL: NORMAL

## 2018-02-22 PROCEDURE — 99213 OFFICE O/P EST LOW 20 MIN: CPT | Performed by: UROLOGY

## 2018-02-22 PROCEDURE — 81003 URINALYSIS AUTO W/O SCOPE: CPT | Performed by: UROLOGY

## 2018-02-22 NOTE — PROGRESS NOTES
Chief Complaint  History of UTI (Patient is here for a 3 month fup.); History of Acute Cystitis; and History of Hematuria      HPI  Jose M Green is a 86 y.o.female who returns today for follow-up with a history of urinary tract infection as well as gross hematuria.  She underwent cystoscopy were no lesions were seen in urine for cytology returned negative.  She does have an overactive bladder and possibly interstitial cystitis that has responded to Myrbetrique but she is dependent on central since her insurance will cover it.  At her age she is a high risk for anticholinergic medications.  This primarily causes problems with urgent incontinence and she also has a mild component of stress incontinence but is not a candidate for surgery.    Vitals:    18 1030   BP: 118/70   Pulse: 63   Temp: 97.9 °F (36.6 °C)   SpO2: 98%       Past Medical History  Past Medical History:   Diagnosis Date   • Arthritis    • Chronic lymphocytic leukemia    • CLL (chronic lymphocytic leukemia)    • Disease of thyroid gland    • DVT (deep venous thrombosis)    • Fractures    • Gall stones    • Heart murmur    • Hypertension    • Inguinal hernia    • Kidney infection    • Stomach problems    • UTI (urinary tract infection)        Past Surgical History  Past Surgical History:   Procedure Laterality Date   • CARDIOVERSION     •  SECTION     • CYSTOSCOPY BLADDER BIOPSY N/A 2017    Procedure: CYSTOSCOPY BLADDER BIOPSY;  Surgeon: Oebd Avalos MD;  Location: [facility];  Service:    • CYSTOSCOPY CYSTOGRAM N/A 2017    Procedure: CYSTOSCOPY CYSTOGRAM, EVACUATION OF BLADDER CLOTS;  Surgeon: Obed Avalos MD;  Location: [facility];  Service:    • HYSTERECTOMY     • OTHER SURGICAL HISTORY      Both Arm Plates   • VARICOSE VEIN SURGERY         Medications  has a current medication list which includes the following prescription(s): aspir-low, atorvastatin, clopidogrel, dok, eliquis, estrace vaginal, latanoprost,  levothyroxine, losartan-hydrochlorothiazide, metoprolol tartrate, mirtazapine, omeprazole, oxybutynin xl, sulfamethoxazole-trimethoprim, tolterodine la, and trazodone.    Allergies  Allergies   Allergen Reactions   • Codeine Nausea And Vomiting   • Contrast Dye Hives   • Aspirin Palpitations       Social History  Social History     Social History   • Marital status:      Spouse name: N/A   • Number of children: N/A   • Years of education: N/A     Occupational History   • Not on file.     Social History Main Topics   • Smoking status: Never Smoker   • Smokeless tobacco: Never Used   • Alcohol use No   • Drug use: No   • Sexual activity: Defer     Other Topics Concern   • Not on file     Social History Narrative       Family History  No family history on file.    Review of Systems  Review of Systems   Constitutional: Negative.    Genitourinary: Positive for frequency, pelvic pain and urgency.   All other systems reviewed and are negative.      Physical Exam  Physical Exam    Labs recent and today in the office:  Results for orders placed or performed in visit on 02/22/18   POC Urinalysis Dipstick, Automated   Result Value Ref Range    Color Yellow Yellow, Straw, Dark Yellow, Tila    Clarity, UA Clear Clear    Glucose, UA Negative Negative, 1000 mg/dL (3+) mg/dL    Bilirubin Negative Negative    Ketones, UA Negative Negative    Specific Gravity  1.010 1.005 - 1.030    Blood, UA Negative Negative    pH, Urine 6.0 5.0 - 8.0    Protein, POC Negative Negative mg/dL    Urobilinogen, UA Normal Normal    Leukocytes Negative Negative    Nitrite, UA Negative Negative         Assessment & Plan  #1 mixed urinary incontinence: Recommend Myrbetrique and this is provided.  Also recommend caffeine reduction and protection but she's not a candidate for surgery    #3 history of UTI: Currently her urine is crystal clear so she can return on an annual basis and when necessary if she thinks she has recurrent infection.

## 2018-04-20 PROBLEM — N32.89 BLOOD CLOT IN BLADDER: Status: RESOLVED | Noted: 2017-09-11 | Resolved: 2018-01-01

## 2018-04-20 NOTE — PROGRESS NOTES
"Venice Green is a 86 y.o. female    HPI 86-year-old female with A. fib and coronary artery disease with a history of anemia thought to be secondary to recurring GI bleeds has an overactive bladder is coming in for a follow-up she is accompanied by her relative whose giving us some of the history.    The following portions of the patient's history were reviewed and updated as appropriate: allergies, current medications, past family history, past medical history, past social history, past surgical history, and problem list.     Review of Systems   Constitutional: Positive for fatigue. Negative for activity change, appetite change and fever.   HENT: Negative for congestion, ear discharge, ear pain and trouble swallowing.    Eyes: Negative for photophobia and visual disturbance.   Respiratory: Negative for cough and shortness of breath.    Cardiovascular: Negative for chest pain and palpitations.   Gastrointestinal: Negative for abdominal distention, abdominal pain, constipation, diarrhea, nausea and vomiting.   Endocrine: Negative.    Genitourinary: Positive for urgency. Negative for dysuria and hematuria.   Musculoskeletal: Negative for arthralgias, back pain, joint swelling and myalgias.   Skin: Negative for color change and rash.   Allergic/Immunologic: Negative.    Neurological: Negative for dizziness, weakness, light-headedness and headaches.   Hematological: Negative for adenopathy. Does not bruise/bleed easily.   Psychiatric/Behavioral: Positive for decreased concentration and sleep disturbance. Negative for agitation, confusion and dysphoric mood. The patient is not nervous/anxious.        Visit Vitals  /90   Pulse 77   Temp 98.5 °F (36.9 °C)   Ht 162.6 cm (64\")   Wt 58.1 kg (128 lb)   SpO2 97%   BMI 21.97 kg/m²       Objective  Physical Exam   Constitutional: She is oriented to person, place, and time. She appears well-developed and well-nourished. No distress.   HENT:   Nose: Nose " normal.   Mouth/Throat: Oropharynx is clear and moist.   Eyes: Conjunctivae and EOM are normal. No scleral icterus.   Neck: No tracheal deviation present. No thyromegaly present.   Cardiovascular: Normal rate and regular rhythm.  Exam reveals no friction rub.    No murmur heard.  Pulmonary/Chest: No respiratory distress. She has no wheezes. She has no rales.   Abdominal: Soft. She exhibits no distension and no mass. There is no tenderness. There is no guarding.   Musculoskeletal: Normal range of motion. She exhibits edema and deformity.   Lymphadenopathy:     She has no cervical adenopathy.   Neurological: She is alert and oriented to person, place, and time. She has normal reflexes. No cranial nerve deficit. Coordination normal.   Skin: Skin is warm and dry. No rash noted. No erythema.   Psychiatric: She has a normal mood and affect. Her behavior is normal. Judgment and thought content normal.       Diagnoses and all orders for this visit:    Acquired hypothyroidism clinically euthyroid follow TSH    Essential hypertension stable with current meds    Chronic lymphocytic leukemia appears stable follow CBC  Not a good candidate for anticoagulant therapy or aspirin because of her risk for falls and recurring anemia. Patient and family are agreeable with the plan

## 2018-04-26 NOTE — TELEPHONE ENCOUNTER
SERGE CALLED WANTING TO DISCUSS LAB RESULTS. STATES SHE HAS BEEN TRYING TO GET IN TOUCH WITH US BUT IS NEVER ABLE TO SPEAK WITH A PERSON.

## 2018-04-26 NOTE — TELEPHONE ENCOUNTER
SPOKE WITH SERGE - SHE IS AWARE OF HER LAB RESULTS. SHE HAS BEEN ADVISED TO DRINK PLENTY OF FLUIDS

## 2018-06-15 NOTE — TELEPHONE ENCOUNTER
PT SON CAME BY AND IS REQUESTING HOME HEALTH SERVICES PATIENT AGAIN.    HE IS ON THE VERBAL RELEASE AND IS HER POA.    HIS CELL #[phone]

## 2018-06-18 NOTE — TELEPHONE ENCOUNTER
Patients sonAnt called stating that the home health agency they want to use for the patient is [facility].

## 2018-06-18 NOTE — TELEPHONE ENCOUNTER
SEE OTHER PHONE NOTE      PATIENT REQUEST HOME HEALTH REFERRAL TO [facility]     PLEASE ADVISE ON ORDERS

## 2018-06-19 NOTE — TELEPHONE ENCOUNTER
Amirah called from [facility].  She was asking for additional information about the new referral to home health.  Wanted to know what services were being requested.  Informed her that all that I could see indicated was that patient had home health previously and that her son had contacted the office to ask whether patient could be re-started with home health.  Amirah states that they would have to have a face-to-face note that matches the indication for the home health order.  Would like someone to call her back to discuss.  Can reach Amirah at [phone]

## 2018-06-25 NOTE — TELEPHONE ENCOUNTER
Fortunato from [facility] called regarding Mrs. Green. She states that the patient has some complaints of pain during urination and some urinary urgency and frequency.      She would like to get a verbal order to do a UA on the patient.     Please advise.       Fortunato can be reached at [phone]

## 2018-07-02 PROBLEM — N30.10 INTERSTITIAL CYSTITIS: Status: ACTIVE | Noted: 2018-01-01

## 2018-07-02 NOTE — PROGRESS NOTES
"Venice Green is a 87 y.o. female    HPI coming in for follow-up continues to have symptoms of interstitial cystitis with urinary frequency and occasional dysuria but does not have any fever or chills recently finished antibiotic therapy with Cipro she has hypothyroidism hypertension and coronary artery disease. She is angina free    The following portions of the patient's history were reviewed and updated as appropriate: allergies, current medications, past family history, past medical history, past social history, past surgical history, and problem list.     Review of Systems   Constitutional: Positive for fatigue. Negative for activity change, appetite change and fever.   HENT: Negative for congestion, ear discharge, ear pain and trouble swallowing.    Eyes: Negative for photophobia and visual disturbance.   Respiratory: Negative for cough and shortness of breath.    Cardiovascular: Negative for chest pain and palpitations.   Gastrointestinal: Negative for abdominal distention, abdominal pain, constipation, diarrhea, nausea and vomiting.   Endocrine: Negative.    Genitourinary: Positive for frequency and urgency. Negative for dysuria and hematuria.   Musculoskeletal: Negative for arthralgias, back pain, joint swelling and myalgias.   Skin: Negative for color change and rash.   Allergic/Immunologic: Negative.    Neurological: Negative for dizziness, weakness, light-headedness and headaches.   Hematological: Negative for adenopathy. Does not bruise/bleed easily.   Psychiatric/Behavioral: Positive for decreased concentration and sleep disturbance. Negative for agitation, confusion and dysphoric mood. The patient is not nervous/anxious.        Visit Vitals  /70   Pulse 68   Temp 98.4 °F (36.9 °C)   Ht 162.6 cm (64\")   Wt 56.2 kg (124 lb)   SpO2 97%   BMI 21.28 kg/m²       Objective  Physical Exam   Constitutional: She is oriented to person, place, and time. She appears well-developed and " well-nourished. No distress.   HENT:   Nose: Nose normal.   Mouth/Throat: Oropharynx is clear and moist.   Eyes: Conjunctivae and EOM are normal. No scleral icterus.   Neck: No tracheal deviation present. No thyromegaly present.   Cardiovascular: Normal rate and regular rhythm.  Exam reveals no friction rub.    No murmur heard.  Pulmonary/Chest: No respiratory distress. She has no wheezes. She has no rales.   Abdominal: Soft. She exhibits no distension and no mass. There is no tenderness. There is no guarding.   Musculoskeletal: Normal range of motion. She exhibits edema and deformity.   Uses a walker   Lymphadenopathy:     She has no cervical adenopathy.   Neurological: She is alert and oriented to person, place, and time. She has normal reflexes. No cranial nerve deficit. Coordination normal.   Skin: Skin is warm and dry. No rash noted. No erythema.   Psychiatric: She has a normal mood and affect. Her behavior is normal. Judgment and thought content normal.       Diagnoses and all orders for this visit:    Coronary artery disease involving native coronary artery of native heart without angina pectoris stable angina free    Essential hypertension. Stable continue with current meds and low-salt diet    Acquired hypothyroidism. Clinically euthyroid follow TSH    Chronic lymphocytic leukemia. Stable    Paroxysmal atrial fibrillation. Stable rate control okay    Interstitial cystitis continue with Mybetriq

## 2018-07-15 NOTE — ED PROVIDER NOTES
Subjective   87-year-old female here today accompanied by family.  Her main complaint is continued urinary frequency and nocturia.  She has a history of interstitial cystitis and frequent UTIs.  She actually has seen her primary care for this and has a urologist.  She does have some ongoing dysuria.  No fever chills or rash.  No vomiting or diarrhea.  She said she gets up in the middle the night maybe 15 times or more.  Because of this she said she feels fatigued and short winded when she has to get up so much.  She actually lives alone at night and her family said she didn't qualify for assisted living she was to capable.            Review of Systems   All other systems reviewed and are negative.      Past Medical History:   Diagnosis Date   • Chronic lymphocytic leukemia (CMS/HCC)    • CLL (chronic lymphocytic leukemia) (CMS/HCC)    • DVT (deep venous thrombosis) (CMS/HCC)    • Inguinal hernia    • Kidney infection        Allergies   Allergen Reactions   • Codeine Nausea And Vomiting   • Contrast Dye Hives   • Aspirin Palpitations       Past Surgical History:   Procedure Laterality Date   • CARDIOVERSION     •  SECTION     • CYSTOSCOPY BLADDER BIOPSY N/A 2017    Procedure: CYSTOSCOPY BLADDER BIOPSY;  Surgeon: Obed Avalos MD;  Location: [facility] OR;  Service:    • CYSTOSCOPY CYSTOGRAM N/A 2017    Procedure: CYSTOSCOPY CYSTOGRAM, EVACUATION OF BLADDER CLOTS;  Surgeon: Obed Avalos MD;  Location: [facility] OR;  Service:    • HYSTERECTOMY     • OTHER SURGICAL HISTORY      Both Arm Plates   • VARICOSE VEIN SURGERY         History reviewed. No pertinent family history.    Social History     Social History   • Marital status:      Social History Main Topics   • Smoking status: Never Smoker   • Smokeless tobacco: Never Used   • Alcohol use No   • Drug use: No   • Sexual activity: Defer     Other Topics Concern   • Not on file           Objective   Physical Exam   Constitutional: She is  oriented to person, place, and time. No distress.   She is a very pleasant thin elderly  female who does not appear acutely ill.  She doesn't appear to be in any type of distress and she is somewhat hard of hearing but answers all questions appropriately and is not confused.   HENT:   Head: Normocephalic.   Eyes: EOM are normal.   Neck: Normal range of motion.   Cardiovascular: Normal rate and regular rhythm.    Pulmonary/Chest: Effort normal and breath sounds normal. No respiratory distress. She has no wheezes. She has no rales.   Abdominal: Soft. Bowel sounds are normal. She exhibits no distension. There is no tenderness.   Musculoskeletal: Normal range of motion.   Neurological: She is alert and oriented to person, place, and time. No cranial nerve deficit or sensory deficit. She exhibits normal muscle tone. Coordination normal.   Skin: Skin is warm. Capillary refill takes less than 2 seconds. No rash noted. She is not diaphoretic.   Psychiatric: She has a normal mood and affect. Her behavior is normal. Thought content normal.   Vitals reviewed.      Procedures           ED Course  ED Course as of Jul 15 1709   Sun Jul 15, 2018   1707 All, her lab tests i.e. CBC and CMP showed no significant abnormality when compared to prior studies.  She does not have a fever or leukocytosis.  Urine does showed continued infection with too numerous to count WBCs trace bacteria and moderate leukocyte esterase.  I had a long talk with the patient's daughter and the patient.  I told him that we really didn't have a policy of writing sleeping medicine and that she should talk her primary care provider about this.  I will place her on a different antibiotic for the UTI.  I think the biggest thing she needs to do is follow-up with her urologist and primary care provider.  The fatigue is certainly from getting up multiple times in the middle of the night to urinate.  I don't feel there is any reason for her to be admitted  today she has no sepsis criteria and doesn't appear acutely ill.  [BW]      ED Course User Index  [BW] Alvin Dela Cruz PA-C                  [facility]      Final diagnoses:   Acute UTI            Alvin Dela Cruz PA-C  07/15/18 [id number]

## 2018-07-16 NOTE — TELEPHONE ENCOUNTER
Rasheeda from [facility] called regarding Mrs. Green. She states that the patient is requesting an order for a reacher. Please fax order to [facility].    Thank you

## 2018-07-16 NOTE — TELEPHONE ENCOUNTER
MELANIE CALLED ABOUT ARETTA.    SERGE WAS IN THE ER OVER THE WEEKEND WITH A UTI. MELANIE STATES THEY HAVE BEEN ADVISED TO NOT TAKE THE ANTIBIOTIC UNLESS DR. VALERIO WANTS HER TO    PLEASE ADVISE

## 2018-07-20 NOTE — TELEPHONE ENCOUNTER
SPOKE WITH JEN, RECEIVED CLARIFICATION ON ORDER FOR REACHER.     ORDER FAXED TO [facility] -[phone]

## 2018-09-06 NOTE — TELEPHONE ENCOUNTER
Dale called on behalf of Jose M. She has complaints of insomnia and anxiety. She has been taking melatonin with out benefit.    Dale is unsure if the anxiety is brought on because she is not sleeping well. Concerns of sleep apnea.    Jose M is scheduled in Oct. Do you want to see her sooner?     Please advise

## 2018-09-06 NOTE — TELEPHONE ENCOUNTER
[facility]    [address] [address]    Phone:  [phone]       Thank You for choosing us for your health care visit. We are glad to serve you and happy to provide you with this summary of your visit. Please help us to ensure we have accurate records. If you find anything that needs to be changed, please let our staff know as soon as possible.          Your Demographic Information     Patient Name Sex Elizabeth Arroyo Female 1958       Ethnic Group Patient Race    Not of  or  Origin White      Your Visit Details     Date & Time Provider Department    2017 10:00 AM [facility]      Your To Do List     Future Orders Please Complete On or Around Expires    C REACTIVE PROTEIN HIGH SENSITIVITY  May 21, 2017 Sep 17, 2017    LIPID PANEL WITH REFLEX  May 21, 2017 Sep 17, 2017    Follow-Up    Return in about 6 months (around 10/17/2017) for Six-month recheck, Hyperlipidemia.      Conditions Discussed Today or Order-Related Diagnoses        Comments    Routine general medical examination at a health care facility    -  Primary     Hyperlipidemia, unspecified hyperlipidemia type         [place] cardiac risk 10-20% in next 10 years           Your Vitals Were     BP Pulse Height Weight LMP BMI    128/82 80 5' 3.75\" (1.619 m) 140 lb (63.5 kg) 2004 24.22 kg/m2    Smoking Status                   Never Smoker           Medications Prescribed or Re-Ordered Today     None      Your Current Medications Are        Disp Refills Start End    Ibuprofen 200 MG tablet 30 tablet 0 2016     Si tabs q d    Class: Historical Med    simvastatin (ZOCOR) 80 MG tablet 45 tablet 3 2016     Sig - Route: Take 0.5 tablets by mouth nightly. - Oral    Class: Eprescribe    ketoconazole (NIZORAL) 2 % shampoo 60 mL 4 2013    Sig: use 2-3 per week to scalp    Class: Eprescribe    lithium (LITHOBID)  Dale has been informed, he will let Jose M know   300 MG CR tablet   1/31/2013     Sig - Route: Take 300 mg by mouth 2 times daily.  - Oral    Class: Historical Med    aspirin EC (ECOTRIN) 81 MG EC tablet        Sig - Route: Take 81 mg by mouth daily. - Oral    Class: Historical Med    CALCIUM 600 + D 600-200 MG-UNIT PO TABS 0 0 3/15/2009     Sig - Route: 2 tabs daily - Oral    Class: Historical Med    MULTIPLE VITAMINS PO TABS 0 0 4/25/2005     Sig - Route: 1 tab po daily - Oral    Class: Historical Med    Cholecalciferol (VITAMIN D3) 2000 UNITS capsule        Sig - Route: Take 2,000 Units by mouth daily. - Oral    Class: Historical Med      Allergies     Iodine Tincture RASH    As a child      Immunizations History as of 4/17/2017     Name Date    Influenza 9/15/2016, 11/1/2012, 11/1/2009, 10/26/2008, 11/27/2007, 11/16/2005, 12/10/2002, 10/18/1995    Td:Adult type tetanus/diphtheria 11/16/2005, 1/1/1997, 10/18/1995    Tdap 3/6/2017      Problem List as of 4/17/2017     Other psoriasis    Acquired absence of organ, genital organs    Seborrheic dermatitis, unspecified    Abnormal involuntary movements    Bipolar I disorder, most recent episode (or current) mixed, unspecified    Other and unspecified hyperlipidemia    Goiter, unspecified    Unspecified hypothyroidism    Osteopenia    Hyperlipidemia    Diverticulosis of colon (without mention of hemorrhage)    Personal history of colon polyps    Family History of GI malignancy    Chest pain, unspecified    Memory change    Other malaise and fatigue              Patient Instructions    Component      Latest Ref Rng & Units 4/7/2015 4/12/2016 2/27/2017   Fasting Status      hrs 12 12    Sodium      135 - 145 mmol/L 142 141    Potassium      3.4 - 5.1 mmol/L 4.3 4.2    Chloride      98 - 107 mmol/L 104 103    CO2      21 - 32 mmol/L 26 29    ANION GAP      10 - 20 mmol/L 16 13    Glucose      65 - 99 mg/dL 93 90    BUN      10 - 20 mg/dL 18 25 (H)    Creatinine      0.51 - 0.95 mg/dL 1.00 0.93    GFR Estimate, African  American       >60 79    GFR Estimate, Non        >60 68    BUN/CREATININE RATIO      7 - 25 18 27 (H)    CALCIUM      8.4 - 10.2 mg/dL 9.8 9.7    TOTAL BILIRUBIN      0.2 - 1.0 mg/dL 1.3 (H) 1.3 (H)    AST/SGOT      <38 Units/L 31 23    ALT/SGPT      <79 Units/L 57 35    ALK PHOSPHATASE      45 - 117 Units/L 83 81    TOTAL PROTEIN      6.4 - 8.2 g/dL 7.6 7.4    Albumin      3.4 - 5.0 g/dL 4.1 4.2    GLOBULIN      2.0 - 4.0 g/dL 3.5 3.2    A/G Ratio, Serum      1.0 - 2.4 1.2 1.3    VITAMIND, 25 HYDROXY      30.0 - 100.0 ng/mL   55.7   FASTING STATUS      hrs 12 12    CHOLESTEROL      100 - 200 mg/dL 182 183    CALCULATED LDL      <130 mg/dL 85 82    HDL      >39 mg/dL 66 77    TRIGLYCERIDE      <150 mg/dL 157 (H) 120    CALCULATED NON HDL      mg/dL 116 106    CHOL/HDL      <4.5 2.8 2.4      Component      Latest Ref Rng & Units 6/10/2003 12/2/2008 12/28/2009 1/12/2011   FASTING STATUS       FASTING FASTING FASTING FASTING   CHOLESTEROL      100 - 199 mg/dL 200 (H) 204 (H) 252 (H) 265 (H)   TRIGLYCERIDE      35 - 149 mg/dL 62 85 139 132   HDL      40 - 200 mg/dL 68 67 67 70   CHOL/HDL       2.95      CALCULATED LDL      0 - 99 mg/dL 120 (H) 120 (H) 156 (H) 169 (H)   LDL/HDL       1.76      CALCULATED NON HDL      0 - 190 mg/dL  137 185 195 (H)       Risk Factors  (RF)     /         10 Year Risk       /      LDL Recommendations  0-1 Risk Factor           /   <10% 10 Year Risk    /             <160  2-5 Risk Factors        / 10-20% 10 Year Risk    /             <130  Personal History of:  CAD (Coronary artery disease), Stroke, DM (Diabetes mellitus),        AAA (Abdominal aortic aneurysm), RA (Rheumatoid arthritis) /   >20% 10 Risk / <100      Risk Factors: Age = Men >45, Women <55    Family History - !st degree relative <55 Male, <65 female  Hypertension  Smoking  Low HDL - Male<40;  Female<50    One cardiac risk factor (age) - 4.6% 10 year risk

## 2018-10-22 NOTE — PROGRESS NOTES
QUICK REFERENCE INFORMATION:  The ABCs of the Annual Wellness Visit    Initial Medicare Wellness Visit    HEALTH RISK ASSESSMENT  87-year-old female with hypothyroidism chronic atrial fibrillation not on anticoagulant therapy due to frequent falls and history of anemia secondary to GI bleed has dyslipidemia and coronary artery disease coming in for wellness visit. She is accompanied by her son was giving us some of the history  6/23/1931    Recent Hospitalizations:  No hospitalization(s) within the last year..        Current Medical Providers:  Patient Care Team:  Speedy Bravo MD as PCP - General  Speedy Bravo MD as PCP - Family Medicine  Speedy Bravo MD as PCP - Claims Attributed  Speedy Bravo MD        Smoking Status:  History   Smoking Status   • Never Smoker   Smokeless Tobacco   • Never Used       Alcohol Consumption:  History   Alcohol Use No       Depression Screen:   PHQ-2/PHQ-9 Depression Screening 10/22/2018   Little interest or pleasure in doing things 0   Feeling down, depressed, or hopeless 0   Total Score 0       Health Habits and Functional and Cognitive Screening:  Functional & Cognitive Status 10/22/2018   Do you have difficulty preparing food and eating? No   Do you have difficulty bathing yourself, getting dressed or grooming yourself? No   Do you have difficulty using the toilet? No   Do you have difficulty moving around from place to place? No   Do you have trouble with steps or getting out of a bed or a chair? No   In the past year have you fallen or experienced a near fall? No   Current Diet Well Balanced Diet   Exercise (times per week) 0 times per week   Current Exercise Activities Include No Regular Exercise   Do you need help using the phone?  No   Are you deaf or do you have serious difficulty hearing?  No   Do you need help with transportation? Yes   Do you need help shopping? Yes   Do you need help preparing meals?  Yes   Do you need help with housework?  Yes   Do you  need help with laundry? Yes   Do you need help taking your medications? Yes   Do you need help managing money? Yes   Do you ever drive or ride in a car without wearing a seat belt? No   Have you felt unusual stress, anger or loneliness in the last month? No   Who do you live with? Alone   If you need help, do you have trouble finding someone available to you? No   Have you been bothered in the last four weeks by sexual problems? No   Do you have difficulty concentrating, remembering or making decisions? No           Does the patient have evidence of cognitive impairment? No    Asiprin use counseling: Start ASA 81 mg daily       Recent Lab Results:    Visual Acuity:  No exam data present    Age-appropriate Screening Schedule:  Refer to the list below for future screening recommendations based on patient's age, sex and/or medical conditions. Orders for these recommended tests are listed in the plan section. The patient has been provided with a written plan.    Health Maintenance   Topic Date Due   • TDAP/TD VACCINES (1 - Tdap) 06/[birth year]/1950   • ZOSTER VACCINE (2 of 2) 09/11/2012   • INFLUENZA VACCINE  08/01/2018   • PNEUMOCOCCAL VACCINES (65+ LOW/MEDIUM RISK)  Completed        Subjective   History of Present Illness    Jose M Green is a 87 y.o. female who presents for an Annual Wellness Visit.    The following portions of the patient's history were reviewed and updated as appropriate: allergies, current medications, past family history, past medical history, past social history, past surgical history and problem list.    Outpatient Medications Prior to Visit   Medication Sig Dispense Refill   • atorvastatin (LIPITOR) 40 MG tablet Take 1 tablet by mouth Daily. 90 tablet 3   • levothyroxine (SYNTHROID, LEVOTHROID) 100 MCG tablet Take 1 tablet by mouth Daily. 200001 90 tablet 2   • Mirabegron ER (MYRBETRIQ) 25 MG tablet sustained-release 24 hour 24 hr tablet Take 1 tablet by mouth Daily. 30 tablet 11   • mirtazapine  (REMERON) 30 MG tablet Take 1 tablet by mouth Every Evening. 30 tablet 4   • omeprazole (priLOSEC) 20 MG capsule Take 1 capsule by mouth Daily. 30 capsule 11   •  MG capsule TAKE 1 CAPSULE BY MOUTH ONE TIME A DAY  30 capsule 4   • metoprolol tartrate (LOPRESSOR) 50 MG tablet TAKE 1 TABLET BY MOUTH TWO TIMES A DAY  180 tablet 3   • cephalexin (KEFLEX) 500 MG capsule Take 1 capsule by mouth 3 (Three) Times a Day. 21 capsule 0     No facility-administered medications prior to visit.        Patient Active Problem List   Diagnosis   • Chronic lymphocytic leukemia (CMS/HCC)   • Essential hypertension   • Acquired hypothyroidism   • Right knee pain   • Paroxysmal atrial fibrillation (CMS/HCC)   • Coronary artery disease involving native coronary artery of native heart without angina pectoris   • Acute cystitis with hematuria   • Hematuria   • Interstitial cystitis       Advance Care Planning:  has an advance directive - a copy HAS NOT been provided    Identification of Risk Factors:  Risk factors include: increased fall risk, incontinence and polypharmacy.    Review of Systems   Constitutional: Positive for fatigue. Negative for activity change, appetite change and fever.   HENT: Negative for congestion, ear discharge, ear pain and trouble swallowing.    Eyes: Negative for photophobia and visual disturbance.   Respiratory: Negative for cough and shortness of breath.    Cardiovascular: Negative for chest pain and palpitations.   Gastrointestinal: Negative for abdominal distention, abdominal pain, constipation, diarrhea, nausea and vomiting.   Endocrine: Negative.    Genitourinary: Positive for frequency. Negative for dysuria, hematuria and urgency.   Musculoskeletal: Negative for arthralgias, back pain, joint swelling and myalgias.   Skin: Negative for color change and rash.   Allergic/Immunologic: Negative.    Neurological: Positive for weakness. Negative for dizziness, light-headedness and headaches.  "  Hematological: Negative for adenopathy. Does not bruise/bleed easily.   Psychiatric/Behavioral: Positive for decreased concentration and sleep disturbance. Negative for agitation, confusion and dysphoric mood. The patient is not nervous/anxious.        Compared to one year ago, the patient feels her physical health is the same.  Compared to one year ago, the patient feels her mental health is the same.    Objective     Physical Exam   Constitutional: She is oriented to person, place, and time. She appears well-developed and well-nourished. No distress.   HENT:   Nose: Nose normal.   Mouth/Throat: Oropharynx is clear and moist.   Eyes: Conjunctivae and EOM are normal. No scleral icterus.   Neck: No tracheal deviation present. No thyromegaly present.   Cardiovascular: Normal rate and regular rhythm.  Exam reveals no friction rub.    No murmur heard.  Pulmonary/Chest: No respiratory distress. She has no wheezes. She has no rales.   Abdominal: Soft. She exhibits no distension and no mass. There is no tenderness. There is no guarding.   Musculoskeletal: Normal range of motion. She exhibits edema and deformity.   Uses a walker   Lymphadenopathy:     She has no cervical adenopathy.   Neurological: She is alert and oriented to person, place, and time. She has normal reflexes. No cranial nerve deficit. Coordination normal.   Skin: Skin is warm and dry. No rash noted. No erythema.   Psychiatric: She has a normal mood and affect. Her behavior is normal. Judgment and thought content normal.       Vitals:    10/22/18 1403   BP: 120/70   Pulse: 58   Temp: 97.1 °F (36.2 °C)   SpO2: 95%   Weight: 53.1 kg (117 lb)   Height: 167.6 cm (66\")   PainSc: 0-No pain       Patient's Body mass index is 18.88 kg/m². BMI is below normal parameters. Recommendations include: treating the underlying disease process.      Assessment/Plan   Patient Self-Management and Personalized Health Advice  The patient has been provided with information " about: fall prevention and preventive services including:   · Fall Risk assessment done, Fall Risk plan of care done, Urinary Incontinence assessment done.    Visit Diagnoses:    ICD-10-CM ICD-9-CM   1. Need for immunization against influenza Z23 V04.81   2. Essential hypertension stable with current meds and low-salt diet  I10 401.9   3. Acquired hypothyroidism. Clinically euthyroid follow TSH  E03.9 244.9   4. Chronic lymphocytic leukemia (CMS/HCC) stable  C91.90 204.10   5. Paroxysmal atrial fibrillation (CMS/HCC) stable rate control okay  I48.0 427.31       Orders Placed This Encounter   Procedures   • Flu Vaccine High Dose PF 65YR+ ([id number])       Outpatient Encounter Prescriptions as of 10/22/2018   Medication Sig Dispense Refill   • atorvastatin (LIPITOR) 40 MG tablet Take 1 tablet by mouth Daily. 90 tablet 3   • docusate sodium (DOK) 100 MG capsule Take 1 capsule by mouth 2 (Two) Times a Day As Needed for Constipation. 90 capsule 3   • levothyroxine (SYNTHROID, LEVOTHROID) 100 MCG tablet Take 1 tablet by mouth Daily. 372480 90 tablet 2   • metoprolol tartrate (LOPRESSOR) 50 MG tablet Take 1 tablet by mouth 2 (Two) Times a Day. 180 tablet 3   • Mirabegron ER (MYRBETRIQ) 25 MG tablet sustained-release 24 hour 24 hr tablet Take 1 tablet by mouth Daily. 30 tablet 11   • mirtazapine (REMERON) 30 MG tablet Take 1 tablet by mouth Every Evening. 30 tablet 4   • omeprazole (priLOSEC) 20 MG capsule Take 1 capsule by mouth Daily. 30 capsule 11   • [DISCONTINUED]  MG capsule TAKE 1 CAPSULE BY MOUTH ONE TIME A DAY  30 capsule 4   • [DISCONTINUED] metoprolol tartrate (LOPRESSOR) 50 MG tablet TAKE 1 TABLET BY MOUTH TWO TIMES A DAY  180 tablet 3   • aspirin 81 MG EC tablet Take 1 tablet by mouth Daily.     • [DISCONTINUED] cephalexin (KEFLEX) 500 MG capsule Take 1 capsule by mouth 3 (Three) Times a Day. 21 capsule 0     No facility-administered encounter medications on file as of 10/22/2018.        Reviewed use of  high risk medication in the elderly: yes  Reviewed for potential of harmful drug interactions in the elderly: yes    Follow Up:  No Follow-up on file.     An After Visit Summary and PPPS with all of these plans were given to the patient.

## 2018-10-22 NOTE — PATIENT INSTRUCTIONS
Medicare Wellness  Personal Prevention Plan of Service     Date of Office Visit:  10/22/2018  Encounter Provider:  Speedy Bravo MD  Place of Service:  [facility]  Patient Name: Jose M Green  :  1931    As part of the Medicare Wellness portion of your visit today, we are providing you with this personalized preventive plan of services (PPPS). This plan is based upon recommendations of the [place] Preventive Services Task Force (USPSTF) and the Advisory Committee on Immunization Practices (ACIP).    This lists the preventive care services that should be considered, and provides dates of when you are due. Items listed as completed are up-to-date and do not require any further intervention.    Health Maintenance   Topic Date Due   • TDAP/TD VACCINES (1 - Tdap) 1950   • ZOSTER VACCINE (2 of 2) 2012   • MEDICARE ANNUAL WELLNESS  2016   • INFLUENZA VACCINE  2018   • PNEUMOCOCCAL VACCINES (65+ LOW/MEDIUM RISK)  Completed       Orders Placed This Encounter   Procedures   • Flu Vaccine High Dose PF 65YR+ ([id number])       No Follow-up on file.

## 2019-01-01 ENCOUNTER — HOSPITAL ENCOUNTER (INPATIENT)
Facility: HOSPITAL | Age: 84
LOS: 2 days | Discharge: NURSING, REHAB OR LONG-TERM CARE | End: 2019-02-20
Attending: EMERGENCY MEDICINE | Admitting: FAMILY MEDICINE

## 2019-01-01 ENCOUNTER — APPOINTMENT (OUTPATIENT)
Dept: GENERAL RADIOLOGY | Facility: HOSPITAL | Age: 84
End: 2019-01-01

## 2019-01-01 ENCOUNTER — APPOINTMENT (OUTPATIENT)
Dept: ULTRASOUND IMAGING | Facility: HOSPITAL | Age: 84
End: 2019-01-01

## 2019-01-01 ENCOUNTER — EPISODE CHANGES (OUTPATIENT)
Dept: CASE MANAGEMENT | Facility: OTHER | Age: 84
End: 2019-01-01

## 2019-01-01 ENCOUNTER — NURSING HOME (OUTPATIENT)
Dept: FAMILY MEDICINE CLINIC | Facility: CLINIC | Age: 84
End: 2019-01-01

## 2019-01-01 ENCOUNTER — HOSPITAL ENCOUNTER (EMERGENCY)
Facility: HOSPITAL | Age: 84
Discharge: HOSPICE | End: 2019-03-05
Attending: EMERGENCY MEDICINE | Admitting: EMERGENCY MEDICINE

## 2019-01-01 ENCOUNTER — HOSPITAL ENCOUNTER (INPATIENT)
Facility: HOSPITAL | Age: 84
LOS: 5 days | Discharge: NURSING, REHAB OR LONG-TERM CARE | End: 2019-02-06
Attending: EMERGENCY MEDICINE | Admitting: INTERNAL MEDICINE

## 2019-01-01 ENCOUNTER — APPOINTMENT (OUTPATIENT)
Dept: NUCLEAR MEDICINE | Facility: HOSPITAL | Age: 84
End: 2019-01-01

## 2019-01-01 VITALS
TEMPERATURE: 97.9 F | HEART RATE: 102 BPM | SYSTOLIC BLOOD PRESSURE: 113 MMHG | WEIGHT: 113.25 LBS | RESPIRATION RATE: 18 BRPM | HEIGHT: 65 IN | DIASTOLIC BLOOD PRESSURE: 68 MMHG | OXYGEN SATURATION: 94 % | BODY MASS INDEX: 18.87 KG/M2

## 2019-01-01 VITALS
DIASTOLIC BLOOD PRESSURE: 72 MMHG | RESPIRATION RATE: 16 BRPM | HEIGHT: 65 IN | WEIGHT: 115.38 LBS | OXYGEN SATURATION: 94 % | TEMPERATURE: 97.9 F | HEART RATE: 91 BPM | BODY MASS INDEX: 19.22 KG/M2 | SYSTOLIC BLOOD PRESSURE: 119 MMHG

## 2019-01-01 VITALS
DIASTOLIC BLOOD PRESSURE: 91 MMHG | SYSTOLIC BLOOD PRESSURE: 140 MMHG | TEMPERATURE: 97.6 F | OXYGEN SATURATION: 97 % | RESPIRATION RATE: 20 BRPM | HEART RATE: 102 BPM

## 2019-01-01 DIAGNOSIS — I50.9 ACUTE CONGESTIVE HEART FAILURE, UNSPECIFIED HEART FAILURE TYPE (HCC): Primary | ICD-10-CM

## 2019-01-01 DIAGNOSIS — J90 PLEURAL EFFUSION: ICD-10-CM

## 2019-01-01 DIAGNOSIS — G92.8 TOXIC METABOLIC ENCEPHALOPATHY: ICD-10-CM

## 2019-01-01 DIAGNOSIS — I50.31 ACUTE DIASTOLIC CONGESTIVE HEART FAILURE (HCC): ICD-10-CM

## 2019-01-01 DIAGNOSIS — I48.0 PAROXYSMAL ATRIAL FIBRILLATION (HCC): ICD-10-CM

## 2019-01-01 DIAGNOSIS — R60.0 LOCALIZED EDEMA: ICD-10-CM

## 2019-01-01 DIAGNOSIS — R06.02 SHORTNESS OF BREATH: ICD-10-CM

## 2019-01-01 DIAGNOSIS — J96.02 ACUTE RESPIRATORY FAILURE WITH HYPERCAPNIA (HCC): Primary | ICD-10-CM

## 2019-01-01 DIAGNOSIS — I48.91 ATRIAL FIBRILLATION, UNSPECIFIED TYPE (HCC): ICD-10-CM

## 2019-01-01 DIAGNOSIS — C91.10 CHRONIC LYMPHOCYTIC LEUKEMIA (HCC): Chronic | ICD-10-CM

## 2019-01-01 DIAGNOSIS — R06.82 TACHYPNEA: ICD-10-CM

## 2019-01-01 DIAGNOSIS — I48.91 ATRIAL FIBRILLATION WITH RVR (HCC): ICD-10-CM

## 2019-01-01 DIAGNOSIS — R09.02 HYPOXIA: ICD-10-CM

## 2019-01-01 DIAGNOSIS — Z74.09 IMPAIRED MOBILITY AND ADLS: ICD-10-CM

## 2019-01-01 DIAGNOSIS — Z74.09 IMPAIRED FUNCTIONAL MOBILITY, BALANCE, GAIT, AND ENDURANCE: ICD-10-CM

## 2019-01-01 DIAGNOSIS — Z78.9 IMPAIRED MOBILITY AND ADLS: ICD-10-CM

## 2019-01-01 DIAGNOSIS — Z09 HOSPITAL DISCHARGE FOLLOW-UP: Primary | ICD-10-CM

## 2019-01-01 DIAGNOSIS — I21.4 NSTEMI (NON-ST ELEVATED MYOCARDIAL INFARCTION) (HCC): ICD-10-CM

## 2019-01-01 DIAGNOSIS — I48.0 PAROXYSMAL A-FIB (HCC): ICD-10-CM

## 2019-01-01 DIAGNOSIS — I50.21 ACUTE SYSTOLIC CONGESTIVE HEART FAILURE (HCC): ICD-10-CM

## 2019-01-01 LAB
A-A DO2: ABNORMAL MMHG
ACINETOBACTER SCREEN CX: NO GROWTH
ALBUMIN SERPL-MCNC: 3.7 G/DL (ref 3.5–5)
ALBUMIN SERPL-MCNC: 4 G/DL (ref 3.5–5)
ALBUMIN SERPL-MCNC: 4.2 G/DL (ref 3.5–5)
ALBUMIN/GLOB SERPL: 1.3 G/DL (ref 1–2)
ALP SERPL-CCNC: 199 U/L (ref 38–126)
ALP SERPL-CCNC: 234 U/L (ref 38–126)
ALP SERPL-CCNC: 367 U/L (ref 38–126)
ALT SERPL W P-5'-P-CCNC: 48 U/L (ref 13–69)
ALT SERPL W P-5'-P-CCNC: 59 U/L (ref 13–69)
ALT SERPL W P-5'-P-CCNC: 99 U/L (ref 13–69)
ANION GAP SERPL CALCULATED.3IONS-SCNC: 10.7 MMOL/L (ref 10–20)
ANION GAP SERPL CALCULATED.3IONS-SCNC: 11.4 MMOL/L (ref 10–20)
ANION GAP SERPL CALCULATED.3IONS-SCNC: 12.1 MMOL/L (ref 10–20)
ANION GAP SERPL CALCULATED.3IONS-SCNC: 12.2 MMOL/L (ref 10–20)
ANION GAP SERPL CALCULATED.3IONS-SCNC: 12.5 MMOL/L (ref 10–20)
ANION GAP SERPL CALCULATED.3IONS-SCNC: 12.7 MMOL/L (ref 10–20)
ANION GAP SERPL CALCULATED.3IONS-SCNC: 13.4 MMOL/L (ref 10–20)
ANION GAP SERPL CALCULATED.3IONS-SCNC: 13.9 MMOL/L (ref 10–20)
ANION GAP SERPL CALCULATED.3IONS-SCNC: 14.1 MMOL/L (ref 10–20)
ANION GAP SERPL CALCULATED.3IONS-SCNC: 14.3 MMOL/L (ref 10–20)
ANION GAP SERPL CALCULATED.3IONS-SCNC: 7.9 MMOL/L (ref 10–20)
ANISOCYTOSIS BLD QL: NORMAL
ARTERIAL PATENCY WRIST A: ABNORMAL
AST SERPL-CCNC: 109 U/L (ref 15–46)
AST SERPL-CCNC: 57 U/L (ref 15–46)
AST SERPL-CCNC: 86 U/L (ref 15–46)
ATMOSPHERIC PRESS: 738 MMHG
BACTERIA SPEC AEROBE CULT: ABNORMAL
BACTERIA SPEC AEROBE CULT: ABNORMAL
BACTERIA UR QL AUTO: ABNORMAL /HPF
BASE EXCESS BLDA CALC-SCNC: 5 MMOL/L (ref 0–2)
BASOPHILS # BLD AUTO: 0.02 10*3/MM3 (ref 0–0.2)
BASOPHILS # BLD AUTO: 0.04 10*3/MM3 (ref 0–0.2)
BASOPHILS NFR BLD AUTO: 0.2 % (ref 0–2.5)
BASOPHILS NFR BLD AUTO: 0.3 % (ref 0–2.5)
BASOPHILS NFR BLD AUTO: 0.4 % (ref 0–2.5)
BASOPHILS NFR BLD AUTO: 0.5 % (ref 0–2.5)
BASOPHILS NFR BLD AUTO: 0.8 % (ref 0–2.5)
BDY SITE: ABNORMAL
BILIRUB SERPL-MCNC: 0.4 MG/DL (ref 0.2–1.3)
BILIRUB SERPL-MCNC: 0.5 MG/DL (ref 0.2–1.3)
BILIRUB SERPL-MCNC: 0.7 MG/DL (ref 0.2–1.3)
BILIRUB UR QL STRIP: ABNORMAL
BUN BLD-MCNC: 34 MG/DL (ref 7–20)
BUN BLD-MCNC: 35 MG/DL (ref 7–20)
BUN BLD-MCNC: 36 MG/DL (ref 7–20)
BUN BLD-MCNC: 36 MG/DL (ref 7–20)
BUN BLD-MCNC: 37 MG/DL (ref 7–20)
BUN BLD-MCNC: 37 MG/DL (ref 7–20)
BUN BLD-MCNC: 38 MG/DL (ref 7–20)
BUN BLD-MCNC: 39 MG/DL (ref 7–20)
BUN BLD-MCNC: 39 MG/DL (ref 7–20)
BUN BLD-MCNC: 40 MG/DL (ref 7–20)
BUN BLD-MCNC: 43 MG/DL (ref 7–20)
BUN/CREAT SERPL: 27.7 (ref 7.1–23.5)
BUN/CREAT SERPL: 27.9 (ref 7.1–23.5)
BUN/CREAT SERPL: 29.2 (ref 7.1–23.5)
BUN/CREAT SERPL: 30 (ref 7.1–23.5)
BUN/CREAT SERPL: 33.6 (ref 7.1–23.5)
BUN/CREAT SERPL: 34 (ref 7.1–23.5)
BUN/CREAT SERPL: 35.5 (ref 7.1–23.5)
BUN/CREAT SERPL: 37 (ref 7.1–23.5)
BUN/CREAT SERPL: 39.1 (ref 7.1–23.5)
BUN/CREAT SERPL: 40 (ref 7.1–23.5)
BUN/CREAT SERPL: 42.2 (ref 7.1–23.5)
CALCIUM SPEC-SCNC: 8 MG/DL (ref 8.4–10.2)
CALCIUM SPEC-SCNC: 8.3 MG/DL (ref 8.4–10.2)
CALCIUM SPEC-SCNC: 8.7 MG/DL (ref 8.4–10.2)
CALCIUM SPEC-SCNC: 8.8 MG/DL (ref 8.4–10.2)
CALCIUM SPEC-SCNC: 8.8 MG/DL (ref 8.4–10.2)
CALCIUM SPEC-SCNC: 8.9 MG/DL (ref 8.4–10.2)
CALCIUM SPEC-SCNC: 9 MG/DL (ref 8.4–10.2)
CALCIUM SPEC-SCNC: 9 MG/DL (ref 8.4–10.2)
CALCIUM SPEC-SCNC: 9.2 MG/DL (ref 8.4–10.2)
CALCIUM SPEC-SCNC: 9.2 MG/DL (ref 8.4–10.2)
CALCIUM SPEC-SCNC: 9.4 MG/DL (ref 8.4–10.2)
CHLORIDE SERPL-SCNC: 100 MMOL/L (ref 98–107)
CHLORIDE SERPL-SCNC: 100 MMOL/L (ref 98–107)
CHLORIDE SERPL-SCNC: 101 MMOL/L (ref 98–107)
CHLORIDE SERPL-SCNC: 103 MMOL/L (ref 98–107)
CHLORIDE SERPL-SCNC: 103 MMOL/L (ref 98–107)
CHLORIDE SERPL-SCNC: 104 MMOL/L (ref 98–107)
CHLORIDE SERPL-SCNC: 104 MMOL/L (ref 98–107)
CHLORIDE SERPL-SCNC: 98 MMOL/L (ref 98–107)
CHLORIDE SERPL-SCNC: 98 MMOL/L (ref 98–107)
CHLORIDE SERPL-SCNC: 99 MMOL/L (ref 98–107)
CHLORIDE SERPL-SCNC: 99 MMOL/L (ref 98–107)
CHOLEST SERPL-MCNC: 127 MG/DL (ref 0–199)
CLARITY UR: ABNORMAL
CO2 SERPL-SCNC: 25 MMOL/L (ref 26–30)
CO2 SERPL-SCNC: 27 MMOL/L (ref 26–30)
CO2 SERPL-SCNC: 28 MMOL/L (ref 26–30)
CO2 SERPL-SCNC: 30 MMOL/L (ref 26–30)
CO2 SERPL-SCNC: 31 MMOL/L (ref 26–30)
CO2 SERPL-SCNC: 31 MMOL/L (ref 26–30)
CO2 SERPL-SCNC: 34 MMOL/L (ref 26–30)
CO2 SERPL-SCNC: 35 MMOL/L (ref 26–30)
COHGB MFR BLD: <0.6 % (ref 0–2)
COLOR UR: ABNORMAL
CREAT BLD-MCNC: 0.9 MG/DL (ref 0.6–1.3)
CREAT BLD-MCNC: 1 MG/DL (ref 0.6–1.3)
CREAT BLD-MCNC: 1.1 MG/DL (ref 0.6–1.3)
CREAT BLD-MCNC: 1.2 MG/DL (ref 0.6–1.3)
CREAT BLD-MCNC: 1.2 MG/DL (ref 0.6–1.3)
CREAT BLD-MCNC: 1.3 MG/DL (ref 0.6–1.3)
CREAT BLD-MCNC: 1.4 MG/DL (ref 0.6–1.3)
D-DIMER, QUANTITATIVE (MAD,POW, STR): 1996 NG/ML (FEU) (ref 0–500)
DEPRECATED RDW RBC AUTO: 52.6 FL (ref 37–54)
DEPRECATED RDW RBC AUTO: 53.1 FL (ref 37–54)
DEPRECATED RDW RBC AUTO: 54.2 FL (ref 37–54)
DEPRECATED RDW RBC AUTO: 55.2 FL (ref 37–54)
DEPRECATED RDW RBC AUTO: 64.1 FL (ref 37–54)
DEPRECATED RDW RBC AUTO: 64.8 FL (ref 37–54)
DEPRECATED RDW RBC AUTO: 65.4 FL (ref 37–54)
DEPRECATED RDW RBC AUTO: 71.4 FL (ref 37–54)
EOSINOPHIL # BLD AUTO: 0 10*3/MM3 (ref 0–0.7)
EOSINOPHIL # BLD AUTO: 0.01 10*3/MM3 (ref 0–0.7)
EOSINOPHIL # BLD AUTO: 0.02 10*3/MM3 (ref 0–0.7)
EOSINOPHIL # BLD AUTO: 0.02 10*3/MM3 (ref 0–0.7)
EOSINOPHIL # BLD AUTO: 0.03 10*3/MM3 (ref 0–0.7)
EOSINOPHIL # BLD AUTO: 0.12 10*3/MM3 (ref 0–0.7)
EOSINOPHIL NFR BLD AUTO: 0 % (ref 0–7)
EOSINOPHIL NFR BLD AUTO: 0.1 % (ref 0–7)
EOSINOPHIL NFR BLD AUTO: 0.2 % (ref 0–7)
EOSINOPHIL NFR BLD AUTO: 0.2 % (ref 0–7)
EOSINOPHIL NFR BLD AUTO: 0.3 % (ref 0–7)
EOSINOPHIL NFR BLD AUTO: 0.3 % (ref 0–7)
EOSINOPHIL NFR BLD AUTO: 0.4 % (ref 0–7)
EOSINOPHIL NFR BLD AUTO: 2.4 % (ref 0–7)
ERYTHROCYTE [DISTWIDTH] IN BLOOD BY AUTOMATED COUNT: 15.9 % (ref 11.5–14.5)
ERYTHROCYTE [DISTWIDTH] IN BLOOD BY AUTOMATED COUNT: 15.9 % (ref 11.5–14.5)
ERYTHROCYTE [DISTWIDTH] IN BLOOD BY AUTOMATED COUNT: 16.5 % (ref 11.5–14.5)
ERYTHROCYTE [DISTWIDTH] IN BLOOD BY AUTOMATED COUNT: 16.7 % (ref 11.5–14.5)
ERYTHROCYTE [DISTWIDTH] IN BLOOD BY AUTOMATED COUNT: 18.1 % (ref 11.5–14.5)
ERYTHROCYTE [DISTWIDTH] IN BLOOD BY AUTOMATED COUNT: 18.5 % (ref 11.5–14.5)
ERYTHROCYTE [DISTWIDTH] IN BLOOD BY AUTOMATED COUNT: 18.6 % (ref 11.5–14.5)
ERYTHROCYTE [DISTWIDTH] IN BLOOD BY AUTOMATED COUNT: 19.6 % (ref 11.5–14.5)
GFR SERPL CREATININE-BSD FRML MDRD: 36 ML/MIN/1.73
GFR SERPL CREATININE-BSD FRML MDRD: 39 ML/MIN/1.73
GFR SERPL CREATININE-BSD FRML MDRD: 42 ML/MIN/1.73
GFR SERPL CREATININE-BSD FRML MDRD: 42 ML/MIN/1.73
GFR SERPL CREATININE-BSD FRML MDRD: 47 ML/MIN/1.73
GFR SERPL CREATININE-BSD FRML MDRD: 52 ML/MIN/1.73
GFR SERPL CREATININE-BSD FRML MDRD: 59 ML/MIN/1.73
GLOBULIN UR ELPH-MCNC: 2.9 GM/DL
GLOBULIN UR ELPH-MCNC: 3 GM/DL
GLOBULIN UR ELPH-MCNC: 3.3 GM/DL
GLUCOSE BLD-MCNC: 104 MG/DL (ref 74–98)
GLUCOSE BLD-MCNC: 109 MG/DL (ref 74–98)
GLUCOSE BLD-MCNC: 111 MG/DL (ref 74–98)
GLUCOSE BLD-MCNC: 114 MG/DL (ref 74–98)
GLUCOSE BLD-MCNC: 121 MG/DL (ref 74–98)
GLUCOSE BLD-MCNC: 127 MG/DL (ref 74–98)
GLUCOSE BLD-MCNC: 78 MG/DL (ref 74–98)
GLUCOSE BLD-MCNC: 83 MG/DL (ref 74–98)
GLUCOSE BLD-MCNC: 84 MG/DL (ref 74–98)
GLUCOSE BLD-MCNC: 92 MG/DL (ref 74–98)
GLUCOSE BLD-MCNC: 98 MG/DL (ref 74–98)
GLUCOSE BLDC GLUCOMTR-MCNC: 104 MG/DL (ref 70–130)
GLUCOSE UR STRIP-MCNC: NEGATIVE MG/DL
HBA1C MFR BLD: 5.3 % (ref 3–6)
HBA1C MFR BLD: 5.4 % (ref 3–6)
HCO3 BLDA-SCNC: 34.6 MMOL/L (ref 22–28)
HCT VFR BLD AUTO: 36 % (ref 37–47)
HCT VFR BLD AUTO: 36 % (ref 37–47)
HCT VFR BLD AUTO: 37.2 % (ref 37–47)
HCT VFR BLD AUTO: 37.6 % (ref 37–47)
HCT VFR BLD AUTO: 37.8 % (ref 37–47)
HCT VFR BLD AUTO: 38.4 % (ref 37–47)
HCT VFR BLD AUTO: 39.2 % (ref 37–47)
HCT VFR BLD AUTO: 42.3 % (ref 37–47)
HCT VFR BLD AUTO: 44.3 % (ref 37–47)
HCT VFR BLD AUTO: 46.1 % (ref 37–47)
HCT VFR BLD CALC: 36.4 %
HDLC SERPL-MCNC: 69 MG/DL (ref 40–60)
HGB BLD-MCNC: 10.8 G/DL (ref 12–16)
HGB BLD-MCNC: 11 G/DL (ref 12–16)
HGB BLD-MCNC: 11.4 G/DL (ref 12–16)
HGB BLD-MCNC: 11.4 G/DL (ref 12–16)
HGB BLD-MCNC: 11.5 G/DL (ref 12–16)
HGB BLD-MCNC: 11.8 G/DL (ref 12–16)
HGB BLD-MCNC: 12 G/DL (ref 12–16)
HGB BLD-MCNC: 12.3 G/DL (ref 12–16)
HGB BLD-MCNC: 12.5 G/DL (ref 12–16)
HGB BLD-MCNC: 13.8 G/DL (ref 12–16)
HGB BLDA-MCNC: 11.9 G/DL (ref 12–18)
HGB UR QL STRIP.AUTO: ABNORMAL
HOLD SPECIMEN: NORMAL
HOLD SPECIMEN: NORMAL
HOROWITZ INDEX BLD+IHG-RTO: 100 %
HYALINE CASTS UR QL AUTO: ABNORMAL /LPF
HYPOCHROMIA BLD QL: NORMAL
IMM GRANULOCYTES # BLD AUTO: 0.02 10*3/MM3 (ref 0–0.06)
IMM GRANULOCYTES # BLD AUTO: 0.03 10*3/MM3 (ref 0–0.06)
IMM GRANULOCYTES # BLD AUTO: 0.04 10*3/MM3 (ref 0–0.06)
IMM GRANULOCYTES # BLD AUTO: 0.06 10*3/MM3 (ref 0–0.06)
IMM GRANULOCYTES # BLD AUTO: 0.07 10*3/MM3 (ref 0–0.06)
IMM GRANULOCYTES # BLD AUTO: 0.07 10*3/MM3 (ref 0–0.06)
IMM GRANULOCYTES # BLD AUTO: 0.08 10*3/MM3 (ref 0–0.06)
IMM GRANULOCYTES # BLD AUTO: 0.11 10*3/MM3 (ref 0–0.06)
IMM GRANULOCYTES NFR BLD AUTO: 0.3 % (ref 0–0.6)
IMM GRANULOCYTES NFR BLD AUTO: 0.4 % (ref 0–0.6)
IMM GRANULOCYTES NFR BLD AUTO: 0.6 % (ref 0–0.6)
IMM GRANULOCYTES NFR BLD AUTO: 0.6 % (ref 0–0.6)
IMM GRANULOCYTES NFR BLD AUTO: 0.7 % (ref 0–0.6)
IMM GRANULOCYTES NFR BLD AUTO: 0.7 % (ref 0–0.6)
IMM GRANULOCYTES NFR BLD AUTO: 0.8 % (ref 0–0.6)
IMM GRANULOCYTES NFR BLD AUTO: 0.9 % (ref 0–0.6)
KETONES UR QL STRIP: ABNORMAL
LDLC SERPL CALC-MCNC: 44 MG/DL (ref 0–99)
LDLC/HDLC SERPL: 0.63 {RATIO}
LEUKOCYTE ESTERASE UR QL STRIP.AUTO: ABNORMAL
LYMPHOCYTES # BLD AUTO: 1.11 10*3/MM3 (ref 0.6–3.4)
LYMPHOCYTES # BLD AUTO: 1.13 10*3/MM3 (ref 0.6–3.4)
LYMPHOCYTES # BLD AUTO: 1.14 10*3/MM3 (ref 0.6–3.4)
LYMPHOCYTES # BLD AUTO: 1.48 10*3/MM3 (ref 0.6–3.4)
LYMPHOCYTES # BLD AUTO: 1.72 10*3/MM3 (ref 0.6–3.4)
LYMPHOCYTES # BLD AUTO: 1.85 10*3/MM3 (ref 0.6–3.4)
LYMPHOCYTES # BLD AUTO: 2.19 10*3/MM3 (ref 0.6–3.4)
LYMPHOCYTES # BLD AUTO: 2.86 10*3/MM3 (ref 0.6–3.4)
LYMPHOCYTES NFR BLD AUTO: 10.4 % (ref 10–50)
LYMPHOCYTES NFR BLD AUTO: 12.3 % (ref 10–50)
LYMPHOCYTES NFR BLD AUTO: 14.5 % (ref 10–50)
LYMPHOCYTES NFR BLD AUTO: 17.3 % (ref 10–50)
LYMPHOCYTES NFR BLD AUTO: 22.4 % (ref 10–50)
LYMPHOCYTES NFR BLD AUTO: 22.9 % (ref 10–50)
LYMPHOCYTES NFR BLD AUTO: 23.4 % (ref 10–50)
LYMPHOCYTES NFR BLD AUTO: 24 % (ref 10–50)
MCH RBC QN AUTO: 28 PG (ref 27–31)
MCH RBC QN AUTO: 28.3 PG (ref 27–31)
MCH RBC QN AUTO: 28.3 PG (ref 27–31)
MCH RBC QN AUTO: 28.8 PG (ref 27–31)
MCH RBC QN AUTO: 28.8 PG (ref 27–31)
MCH RBC QN AUTO: 28.9 PG (ref 27–31)
MCHC RBC AUTO-ENTMCNC: 28.2 G/DL (ref 30–37)
MCHC RBC AUTO-ENTMCNC: 29.1 G/DL (ref 30–37)
MCHC RBC AUTO-ENTMCNC: 29.9 G/DL (ref 30–37)
MCHC RBC AUTO-ENTMCNC: 30.2 G/DL (ref 30–37)
MCHC RBC AUTO-ENTMCNC: 30.6 G/DL (ref 30–37)
MCHC RBC AUTO-ENTMCNC: 30.7 G/DL (ref 30–37)
MCHC RBC AUTO-ENTMCNC: 30.9 G/DL (ref 30–37)
MCHC RBC AUTO-ENTMCNC: 31.7 G/DL (ref 30–37)
MCV RBC AUTO: 100.2 FL (ref 81–99)
MCV RBC AUTO: 91 FL (ref 81–99)
MCV RBC AUTO: 91.1 FL (ref 81–99)
MCV RBC AUTO: 91.4 FL (ref 81–99)
MCV RBC AUTO: 91.6 FL (ref 81–99)
MCV RBC AUTO: 95.5 FL (ref 81–99)
MCV RBC AUTO: 96 FL (ref 81–99)
MCV RBC AUTO: 96.1 FL (ref 81–99)
METHGB BLD QL: 0.9 % (ref 0–1.5)
MODALITY: ABNORMAL
MONOCYTES # BLD AUTO: 0.35 10*3/MM3 (ref 0–0.9)
MONOCYTES # BLD AUTO: 0.62 10*3/MM3 (ref 0–0.9)
MONOCYTES # BLD AUTO: 0.65 10*3/MM3 (ref 0–0.9)
MONOCYTES # BLD AUTO: 0.69 10*3/MM3 (ref 0–0.9)
MONOCYTES # BLD AUTO: 0.73 10*3/MM3 (ref 0–0.9)
MONOCYTES # BLD AUTO: 0.76 10*3/MM3 (ref 0–0.9)
MONOCYTES # BLD AUTO: 0.84 10*3/MM3 (ref 0–0.9)
MONOCYTES # BLD AUTO: 0.84 10*3/MM3 (ref 0–0.9)
MONOCYTES NFR BLD AUTO: 6.3 % (ref 0–12)
MONOCYTES NFR BLD AUTO: 6.7 % (ref 0–12)
MONOCYTES NFR BLD AUTO: 7 % (ref 0–12)
MONOCYTES NFR BLD AUTO: 7.1 % (ref 0–12)
MONOCYTES NFR BLD AUTO: 7.2 % (ref 0–12)
MONOCYTES NFR BLD AUTO: 7.6 % (ref 0–12)
MONOCYTES NFR BLD AUTO: 7.9 % (ref 0–12)
MONOCYTES NFR BLD AUTO: 9.3 % (ref 0–12)
MRSA SPEC QL CULT: NORMAL
NEUTROPHILS # BLD AUTO: 3.27 10*3/MM3 (ref 2–6.9)
NEUTROPHILS # BLD AUTO: 5.33 10*3/MM3 (ref 2–6.9)
NEUTROPHILS # BLD AUTO: 6.81 10*3/MM3 (ref 2–6.9)
NEUTROPHILS # BLD AUTO: 6.97 10*3/MM3 (ref 2–6.9)
NEUTROPHILS # BLD AUTO: 7.38 10*3/MM3 (ref 2–6.9)
NEUTROPHILS # BLD AUTO: 7.87 10*3/MM3 (ref 2–6.9)
NEUTROPHILS # BLD AUTO: 8.08 10*3/MM3 (ref 2–6.9)
NEUTROPHILS # BLD AUTO: 9.01 10*3/MM3 (ref 2–6.9)
NEUTROPHILS NFR BLD AUTO: 66.2 % (ref 37–80)
NEUTROPHILS NFR BLD AUTO: 67.5 % (ref 37–80)
NEUTROPHILS NFR BLD AUTO: 67.8 % (ref 37–80)
NEUTROPHILS NFR BLD AUTO: 69.8 % (ref 37–80)
NEUTROPHILS NFR BLD AUTO: 74.1 % (ref 37–80)
NEUTROPHILS NFR BLD AUTO: 77.1 % (ref 37–80)
NEUTROPHILS NFR BLD AUTO: 77.3 % (ref 37–80)
NEUTROPHILS NFR BLD AUTO: 82.2 % (ref 37–80)
NITRITE UR QL STRIP: POSITIVE
NOTE: ABNORMAL
NRBC BLD AUTO-RTO: 0 /100 WBC (ref 0–0)
NT-PROBNP SERPL-MCNC: ABNORMAL PG/ML (ref 0–450)
OVALOCYTES BLD QL SMEAR: NORMAL
OXYHGB MFR BLDV: 98.3 % (ref 94–99)
PAW @ PEAK INSP FLOW SETTING VENT: 12 CMH2O
PCO2 BLDA: 80 MM HG (ref 35–45)
PCO2 TEMP ADJ BLD: ABNORMAL MM HG (ref 35–45)
PEEP RESPIRATORY: 6 CM[H2O]
PH BLDA: 7.24 PH UNITS (ref 7.3–7.5)
PH UR STRIP.AUTO: 5.5 [PH] (ref 5–8)
PH, TEMP CORRECTED: ABNORMAL PH UNITS
PLATELET # BLD AUTO: 116 10*3/MM3 (ref 130–400)
PLATELET # BLD AUTO: 139 10*3/MM3 (ref 130–400)
PLATELET # BLD AUTO: 148 10*3/MM3 (ref 130–400)
PLATELET # BLD AUTO: 167 10*3/MM3 (ref 130–400)
PLATELET # BLD AUTO: 195 10*3/MM3 (ref 130–400)
PLATELET # BLD AUTO: 217 10*3/MM3 (ref 130–400)
PLATELET # BLD AUTO: 258 10*3/MM3 (ref 130–400)
PLATELET # BLD AUTO: 262 10*3/MM3 (ref 130–400)
PMV BLD AUTO: 10.2 FL (ref 6–12)
PMV BLD AUTO: 10.3 FL (ref 6–12)
PMV BLD AUTO: 10.3 FL (ref 6–12)
PMV BLD AUTO: 10.4 FL (ref 6–12)
PMV BLD AUTO: 11 FL (ref 6–12)
PMV BLD AUTO: 11.2 FL (ref 6–12)
PMV BLD AUTO: 11.6 FL (ref 6–12)
PMV BLD AUTO: 11.8 FL (ref 6–12)
PO2 BLDA: 196 MM HG (ref 75–100)
PO2 TEMP ADJ BLD: ABNORMAL MM HG (ref 83–108)
POIKILOCYTOSIS BLD QL SMEAR: NORMAL
POTASSIUM BLD-SCNC: 3.7 MMOL/L (ref 3.5–5.1)
POTASSIUM BLD-SCNC: 4.1 MMOL/L (ref 3.5–5.1)
POTASSIUM BLD-SCNC: 4.3 MMOL/L (ref 3.5–5.1)
POTASSIUM BLD-SCNC: 4.5 MMOL/L (ref 3.5–5.1)
POTASSIUM BLD-SCNC: 4.5 MMOL/L (ref 3.5–5.1)
POTASSIUM BLD-SCNC: 4.7 MMOL/L (ref 3.5–5.1)
POTASSIUM BLD-SCNC: 4.9 MMOL/L (ref 3.5–5.1)
POTASSIUM BLD-SCNC: 5.2 MMOL/L (ref 3.5–5.1)
POTASSIUM BLD-SCNC: 5.3 MMOL/L (ref 3.5–5.1)
POTASSIUM BLD-SCNC: 5.4 MMOL/L (ref 3.5–5.1)
POTASSIUM BLD-SCNC: 5.9 MMOL/L (ref 3.5–5.1)
POTASSIUM BLD-SCNC: 6.1 MMOL/L (ref 3.5–5.1)
POTASSIUM BLD-SCNC: 6.4 MMOL/L (ref 3.5–5.1)
PROT SERPL-MCNC: 6.6 G/DL (ref 6.3–8.2)
PROT SERPL-MCNC: 7 G/DL (ref 6.3–8.2)
PROT SERPL-MCNC: 7.5 G/DL (ref 6.3–8.2)
PROT UR QL STRIP: ABNORMAL
RBC # BLD AUTO: 3.95 10*6/MM3 (ref 4.2–5.4)
RBC # BLD AUTO: 3.96 10*6/MM3 (ref 4.2–5.4)
RBC # BLD AUTO: 4.07 10*6/MM3 (ref 4.2–5.4)
RBC # BLD AUTO: 4.22 10*6/MM3 (ref 4.2–5.4)
RBC # BLD AUTO: 4.28 10*6/MM3 (ref 4.2–5.4)
RBC # BLD AUTO: 4.4 10*6/MM3 (ref 4.2–5.4)
RBC # BLD AUTO: 4.42 10*6/MM3 (ref 4.2–5.4)
RBC # BLD AUTO: 4.8 10*6/MM3 (ref 4.2–5.4)
RBC # UR: ABNORMAL /HPF
REF LAB TEST METHOD: ABNORMAL
SAO2 % BLDCOA: 99.7 % (ref 94–100)
SMALL PLATELETS BLD QL SMEAR: ADEQUATE
SODIUM BLD-SCNC: 136 MMOL/L (ref 137–145)
SODIUM BLD-SCNC: 137 MMOL/L (ref 137–145)
SODIUM BLD-SCNC: 138 MMOL/L (ref 137–145)
SODIUM BLD-SCNC: 139 MMOL/L (ref 137–145)
SODIUM BLD-SCNC: 140 MMOL/L (ref 137–145)
SODIUM BLD-SCNC: 141 MMOL/L (ref 137–145)
SP GR UR STRIP: 1.02 (ref 1–1.03)
SQUAMOUS #/AREA URNS HPF: ABNORMAL /HPF
TRIGL SERPL-MCNC: 72 MG/DL
TROPONIN I SERPL-MCNC: 0.53 NG/ML (ref 0–0.03)
TROPONIN I SERPL-MCNC: 0.75 NG/ML (ref 0–0.03)
TROPONIN I SERPL-MCNC: <0.012 NG/ML (ref 0–0.03)
TSH SERPL DL<=0.05 MIU/L-ACNC: 5.44 MIU/ML (ref 0.47–4.68)
TSH SERPL DL<=0.05 MIU/L-ACNC: 6.63 MIU/ML (ref 0.47–4.68)
UROBILINOGEN UR QL STRIP: ABNORMAL
VENTILATOR MODE: ABNORMAL
VLDLC SERPL-MCNC: 14.4 MG/DL
VRE SPEC QL CULT: NORMAL
WBC CLUMPS # UR AUTO: ABNORMAL /HPF
WBC MORPH BLD: NORMAL
WBC NRBC COR # BLD: 10.18 10*3/MM3 (ref 4.8–10.8)
WBC NRBC COR # BLD: 10.96 10*3/MM3 (ref 4.8–10.8)
WBC NRBC COR # BLD: 11.93 10*3/MM3 (ref 4.8–10.8)
WBC NRBC COR # BLD: 4.94 10*3/MM3 (ref 4.8–10.8)
WBC NRBC COR # BLD: 7.89 10*3/MM3 (ref 4.8–10.8)
WBC NRBC COR # BLD: 9.04 10*3/MM3 (ref 4.8–10.8)
WBC NRBC COR # BLD: 9.76 10*3/MM3 (ref 4.8–10.8)
WBC NRBC COR # BLD: 9.96 10*3/MM3 (ref 4.8–10.8)
WBC UR QL AUTO: ABNORMAL /HPF
WHOLE BLOOD HOLD SPECIMEN: NORMAL
WHOLE BLOOD HOLD SPECIMEN: NORMAL

## 2019-01-01 PROCEDURE — 94799 UNLISTED PULMONARY SVC/PX: CPT

## 2019-01-01 PROCEDURE — 85025 COMPLETE CBC W/AUTO DIFF WBC: CPT

## 2019-01-01 PROCEDURE — 0 TECHNETIUM ALBUMIN AGGREGATED: Performed by: INTERNAL MEDICINE

## 2019-01-01 PROCEDURE — 25010000002 FUROSEMIDE PER 20 MG: Performed by: EMERGENCY MEDICINE

## 2019-01-01 PROCEDURE — 93005 ELECTROCARDIOGRAM TRACING: CPT

## 2019-01-01 PROCEDURE — 99233 SBSQ HOSP IP/OBS HIGH 50: CPT | Performed by: INTERNAL MEDICINE

## 2019-01-01 PROCEDURE — 80053 COMPREHEN METABOLIC PANEL: CPT | Performed by: EMERGENCY MEDICINE

## 2019-01-01 PROCEDURE — 99232 SBSQ HOSP IP/OBS MODERATE 35: CPT | Performed by: FAMILY MEDICINE

## 2019-01-01 PROCEDURE — 80053 COMPREHEN METABOLIC PANEL: CPT

## 2019-01-01 PROCEDURE — 83036 HEMOGLOBIN GLYCOSYLATED A1C: CPT | Performed by: INTERNAL MEDICINE

## 2019-01-01 PROCEDURE — 85025 COMPLETE CBC W/AUTO DIFF WBC: CPT | Performed by: INTERNAL MEDICINE

## 2019-01-01 PROCEDURE — 94640 AIRWAY INHALATION TREATMENT: CPT

## 2019-01-01 PROCEDURE — 85007 BL SMEAR W/DIFF WBC COUNT: CPT

## 2019-01-01 PROCEDURE — 97530 THERAPEUTIC ACTIVITIES: CPT

## 2019-01-01 PROCEDURE — 85014 HEMATOCRIT: CPT | Performed by: FAMILY MEDICINE

## 2019-01-01 PROCEDURE — 99222 1ST HOSP IP/OBS MODERATE 55: CPT | Performed by: FAMILY MEDICINE

## 2019-01-01 PROCEDURE — 71100 X-RAY EXAM RIBS UNI 2 VIEWS: CPT

## 2019-01-01 PROCEDURE — 63710000001 INSULIN REGULAR HUMAN PER 5 UNITS: Performed by: EMERGENCY MEDICINE

## 2019-01-01 PROCEDURE — 87077 CULTURE AEROBIC IDENTIFY: CPT | Performed by: INTERNAL MEDICINE

## 2019-01-01 PROCEDURE — 84443 ASSAY THYROID STIM HORMONE: CPT | Performed by: FAMILY MEDICINE

## 2019-01-01 PROCEDURE — 83880 ASSAY OF NATRIURETIC PEPTIDE: CPT

## 2019-01-01 PROCEDURE — 71045 X-RAY EXAM CHEST 1 VIEW: CPT

## 2019-01-01 PROCEDURE — 36600 WITHDRAWAL OF ARTERIAL BLOOD: CPT

## 2019-01-01 PROCEDURE — 82805 BLOOD GASES W/O2 SATURATION: CPT

## 2019-01-01 PROCEDURE — 99309 SBSQ NF CARE MODERATE MDM 30: CPT | Performed by: NURSE PRACTITIONER

## 2019-01-01 PROCEDURE — 84484 ASSAY OF TROPONIN QUANT: CPT

## 2019-01-01 PROCEDURE — 25010000002 AMIODARONE IN DEXTROSE 5% 150-4.21 MG/100ML-% SOLUTION: Performed by: INTERNAL MEDICINE

## 2019-01-01 PROCEDURE — 25010000002 ENOXAPARIN PER 10 MG: Performed by: EMERGENCY MEDICINE

## 2019-01-01 PROCEDURE — 80048 BASIC METABOLIC PNL TOTAL CA: CPT | Performed by: FAMILY MEDICINE

## 2019-01-01 PROCEDURE — 99232 SBSQ HOSP IP/OBS MODERATE 35: CPT | Performed by: INTERNAL MEDICINE

## 2019-01-01 PROCEDURE — 84484 ASSAY OF TROPONIN QUANT: CPT | Performed by: FAMILY MEDICINE

## 2019-01-01 PROCEDURE — 63710000001 DIPHENHYDRAMINE PER 50 MG: Performed by: EMERGENCY MEDICINE

## 2019-01-01 PROCEDURE — 80048 BASIC METABOLIC PNL TOTAL CA: CPT | Performed by: INTERNAL MEDICINE

## 2019-01-01 PROCEDURE — 83050 HGB METHEMOGLOBIN QUAN: CPT

## 2019-01-01 PROCEDURE — 84132 ASSAY OF SERUM POTASSIUM: CPT | Performed by: INTERNAL MEDICINE

## 2019-01-01 PROCEDURE — 83036 HEMOGLOBIN GLYCOSYLATED A1C: CPT | Performed by: FAMILY MEDICINE

## 2019-01-01 PROCEDURE — 85025 COMPLETE CBC W/AUTO DIFF WBC: CPT | Performed by: FAMILY MEDICINE

## 2019-01-01 PROCEDURE — 97162 PT EVAL MOD COMPLEX 30 MIN: CPT

## 2019-01-01 PROCEDURE — 25010000002 AMIODARONE IN DEXTROSE 5% 360-4.14 MG/200ML-% SOLUTION: Performed by: INTERNAL MEDICINE

## 2019-01-01 PROCEDURE — 87081 CULTURE SCREEN ONLY: CPT | Performed by: FAMILY MEDICINE

## 2019-01-01 PROCEDURE — 82375 ASSAY CARBOXYHB QUANT: CPT

## 2019-01-01 PROCEDURE — 99239 HOSP IP/OBS DSCHRG MGMT >30: CPT | Performed by: FAMILY MEDICINE

## 2019-01-01 PROCEDURE — 78582 LUNG VENTILAT&PERFUS IMAGING: CPT

## 2019-01-01 PROCEDURE — 97165 OT EVAL LOW COMPLEX 30 MIN: CPT

## 2019-01-01 PROCEDURE — 25010000002 ONDANSETRON PER 1 MG: Performed by: HOSPITALIST

## 2019-01-01 PROCEDURE — 71046 X-RAY EXAM CHEST 2 VIEWS: CPT

## 2019-01-01 PROCEDURE — 80061 LIPID PANEL: CPT | Performed by: FAMILY MEDICINE

## 2019-01-01 PROCEDURE — 99223 1ST HOSP IP/OBS HIGH 75: CPT | Performed by: UROLOGY

## 2019-01-01 PROCEDURE — 99239 HOSP IP/OBS DSCHRG MGMT >30: CPT | Performed by: INTERNAL MEDICINE

## 2019-01-01 PROCEDURE — 84132 ASSAY OF SERUM POTASSIUM: CPT | Performed by: FAMILY MEDICINE

## 2019-01-01 PROCEDURE — 85025 COMPLETE CBC W/AUTO DIFF WBC: CPT | Performed by: EMERGENCY MEDICINE

## 2019-01-01 PROCEDURE — 83880 ASSAY OF NATRIURETIC PEPTIDE: CPT | Performed by: EMERGENCY MEDICINE

## 2019-01-01 PROCEDURE — 25010000002 FUROSEMIDE PER 20 MG: Performed by: FAMILY MEDICINE

## 2019-01-01 PROCEDURE — 81001 URINALYSIS AUTO W/SCOPE: CPT | Performed by: INTERNAL MEDICINE

## 2019-01-01 PROCEDURE — 82962 GLUCOSE BLOOD TEST: CPT

## 2019-01-01 PROCEDURE — 85018 HEMOGLOBIN: CPT | Performed by: FAMILY MEDICINE

## 2019-01-01 PROCEDURE — 76775 US EXAM ABDO BACK WALL LIM: CPT

## 2019-01-01 PROCEDURE — 96374 THER/PROPH/DIAG INJ IV PUSH: CPT

## 2019-01-01 PROCEDURE — 99285 EMERGENCY DEPT VISIT HI MDM: CPT

## 2019-01-01 PROCEDURE — 84484 ASSAY OF TROPONIN QUANT: CPT | Performed by: EMERGENCY MEDICINE

## 2019-01-01 PROCEDURE — 93005 ELECTROCARDIOGRAM TRACING: CPT | Performed by: EMERGENCY MEDICINE

## 2019-01-01 PROCEDURE — 85379 FIBRIN DEGRADATION QUANT: CPT | Performed by: EMERGENCY MEDICINE

## 2019-01-01 PROCEDURE — 87086 URINE CULTURE/COLONY COUNT: CPT | Performed by: INTERNAL MEDICINE

## 2019-01-01 PROCEDURE — 87186 SC STD MICRODIL/AGAR DIL: CPT | Performed by: INTERNAL MEDICINE

## 2019-01-01 PROCEDURE — 84443 ASSAY THYROID STIM HORMONE: CPT | Performed by: INTERNAL MEDICINE

## 2019-01-01 PROCEDURE — 94660 CPAP INITIATION&MGMT: CPT

## 2019-01-01 PROCEDURE — 25010000002 MAGNESIUM SULFATE 2 GM/50ML SOLUTION: Performed by: INTERNAL MEDICINE

## 2019-01-01 PROCEDURE — 84484 ASSAY OF TROPONIN QUANT: CPT | Performed by: INTERNAL MEDICINE

## 2019-01-01 PROCEDURE — A9540 TC99M MAA: HCPCS | Performed by: INTERNAL MEDICINE

## 2019-01-01 PROCEDURE — 99223 1ST HOSP IP/OBS HIGH 75: CPT | Performed by: INTERNAL MEDICINE

## 2019-01-01 PROCEDURE — 97110 THERAPEUTIC EXERCISES: CPT

## 2019-01-01 PROCEDURE — 93005 ELECTROCARDIOGRAM TRACING: CPT | Performed by: FAMILY MEDICINE

## 2019-01-01 RX ORDER — ASPIRIN 81 MG/1
324 TABLET, CHEWABLE ORAL ONCE
Status: COMPLETED | OUTPATIENT
Start: 2019-01-01 | End: 2019-01-01

## 2019-01-01 RX ORDER — ASPIRIN 81 MG/1
81 TABLET ORAL DAILY
Status: DISCONTINUED | OUTPATIENT
Start: 2019-01-01 | End: 2019-01-01

## 2019-01-01 RX ORDER — OXYBUTYNIN CHLORIDE 5 MG/1
5 TABLET, EXTENDED RELEASE ORAL DAILY
Status: DISCONTINUED | OUTPATIENT
Start: 2019-01-01 | End: 2019-01-01 | Stop reason: HOSPADM

## 2019-01-01 RX ORDER — ATORVASTATIN CALCIUM 40 MG/1
40 TABLET, FILM COATED ORAL DAILY
Status: DISCONTINUED | OUTPATIENT
Start: 2019-01-01 | End: 2019-01-01 | Stop reason: HOSPADM

## 2019-01-01 RX ORDER — DOCUSATE SODIUM 100 MG/1
100 CAPSULE, LIQUID FILLED ORAL 2 TIMES DAILY PRN
Status: DISCONTINUED | OUTPATIENT
Start: 2019-01-01 | End: 2019-01-01 | Stop reason: HOSPADM

## 2019-01-01 RX ORDER — DEXTROSE MONOHYDRATE 25 G/50ML
50 INJECTION, SOLUTION INTRAVENOUS ONCE
Status: COMPLETED | OUTPATIENT
Start: 2019-01-01 | End: 2019-01-01

## 2019-01-01 RX ORDER — SODIUM POLYSTYRENE SULFONATE 15 G/60ML
15 SUSPENSION ORAL; RECTAL ONCE
Status: COMPLETED | OUTPATIENT
Start: 2019-01-01 | End: 2019-01-01

## 2019-01-01 RX ORDER — MIRTAZAPINE 30 MG/1
TABLET, FILM COATED ORAL
Qty: 30 TABLET | Refills: 3 | Status: SHIPPED | OUTPATIENT
Start: 2019-01-01

## 2019-01-01 RX ORDER — PANTOPRAZOLE SODIUM 40 MG/1
40 TABLET, DELAYED RELEASE ORAL
Status: DISCONTINUED | OUTPATIENT
Start: 2019-01-01 | End: 2019-01-01 | Stop reason: HOSPADM

## 2019-01-01 RX ORDER — LEVOTHYROXINE SODIUM 112 UG/1
112 TABLET ORAL DAILY
Status: DISCONTINUED | OUTPATIENT
Start: 2019-01-01 | End: 2019-01-01 | Stop reason: HOSPADM

## 2019-01-01 RX ORDER — ONDANSETRON 2 MG/ML
4 INJECTION INTRAMUSCULAR; INTRAVENOUS EVERY 6 HOURS PRN
Status: DISCONTINUED | OUTPATIENT
Start: 2019-01-01 | End: 2019-01-01 | Stop reason: HOSPADM

## 2019-01-01 RX ORDER — SODIUM CHLORIDE 0.9 % (FLUSH) 0.9 %
10 SYRINGE (ML) INJECTION AS NEEDED
Status: DISCONTINUED | OUTPATIENT
Start: 2019-01-01 | End: 2019-01-01 | Stop reason: HOSPADM

## 2019-01-01 RX ORDER — SODIUM CHLORIDE 9 MG/ML
75 INJECTION, SOLUTION INTRAVENOUS CONTINUOUS
Status: ACTIVE | OUTPATIENT
Start: 2019-01-01 | End: 2019-01-01

## 2019-01-01 RX ORDER — DIPHENHYDRAMINE HCL 25 MG
25 CAPSULE ORAL ONCE
Status: COMPLETED | OUTPATIENT
Start: 2019-01-01 | End: 2019-01-01

## 2019-01-01 RX ORDER — SODIUM CHLORIDE 9 MG/ML
50 INJECTION, SOLUTION INTRAVENOUS CONTINUOUS
Status: DISCONTINUED | OUTPATIENT
Start: 2019-01-01 | End: 2019-01-01

## 2019-01-01 RX ORDER — SODIUM CHLORIDE 0.9 % (FLUSH) 0.9 %
3-10 SYRINGE (ML) INJECTION AS NEEDED
Status: DISCONTINUED | OUTPATIENT
Start: 2019-01-01 | End: 2019-01-01

## 2019-01-01 RX ORDER — AMOXICILLIN 500 MG/1
500 CAPSULE ORAL EVERY 12 HOURS SCHEDULED
Qty: 14 CAPSULE | Refills: 0
Start: 2019-01-01 | End: 2019-01-01

## 2019-01-01 RX ORDER — AMIODARONE HYDROCHLORIDE 200 MG/1
200 TABLET ORAL
Start: 2019-01-01

## 2019-01-01 RX ORDER — MIRTAZAPINE 15 MG/1
30 TABLET, FILM COATED ORAL NIGHTLY
Status: DISCONTINUED | OUTPATIENT
Start: 2019-01-01 | End: 2019-01-01 | Stop reason: HOSPADM

## 2019-01-01 RX ORDER — AMIODARONE HYDROCHLORIDE 200 MG/1
200 TABLET ORAL ONCE
Status: DISCONTINUED | OUTPATIENT
Start: 2019-01-01 | End: 2019-01-01 | Stop reason: HOSPADM

## 2019-01-01 RX ORDER — METOPROLOL TARTRATE 50 MG/1
50 TABLET, FILM COATED ORAL EVERY 12 HOURS SCHEDULED
Status: DISCONTINUED | OUTPATIENT
Start: 2019-01-01 | End: 2019-01-01 | Stop reason: HOSPADM

## 2019-01-01 RX ORDER — SODIUM CHLORIDE 0.9 % (FLUSH) 0.9 %
3-10 SYRINGE (ML) INJECTION AS NEEDED
Status: DISCONTINUED | OUTPATIENT
Start: 2019-01-01 | End: 2019-01-01 | Stop reason: HOSPADM

## 2019-01-01 RX ORDER — LEVOTHYROXINE SODIUM 0.1 MG/1
100 TABLET ORAL
Status: DISCONTINUED | OUTPATIENT
Start: 2019-01-01 | End: 2019-01-01 | Stop reason: HOSPADM

## 2019-01-01 RX ORDER — METOPROLOL TARTRATE 50 MG/1
50 TABLET, FILM COATED ORAL 4 TIMES DAILY
Status: DISCONTINUED | OUTPATIENT
Start: 2019-01-01 | End: 2019-01-01

## 2019-01-01 RX ORDER — DILTIAZEM HYDROCHLORIDE 180 MG/1
180 CAPSULE, COATED, EXTENDED RELEASE ORAL
Start: 2019-01-01

## 2019-01-01 RX ORDER — DILTIAZEM HYDROCHLORIDE 180 MG/1
180 CAPSULE, COATED, EXTENDED RELEASE ORAL
Status: DISCONTINUED | OUTPATIENT
Start: 2019-01-01 | End: 2019-01-01 | Stop reason: HOSPADM

## 2019-01-01 RX ORDER — AMOXICILLIN 500 MG/1
500 CAPSULE ORAL EVERY 12 HOURS SCHEDULED
Status: DISCONTINUED | OUTPATIENT
Start: 2019-01-01 | End: 2019-01-01 | Stop reason: HOSPADM

## 2019-01-01 RX ORDER — LEVOTHYROXINE SODIUM 112 UG/1
112 TABLET ORAL DAILY
Start: 2019-01-01

## 2019-01-01 RX ORDER — ALBUTEROL SULFATE 2.5 MG/3ML
10 SOLUTION RESPIRATORY (INHALATION) ONCE
Status: COMPLETED | OUTPATIENT
Start: 2019-01-01 | End: 2019-01-01

## 2019-01-01 RX ORDER — SULFAMETHOXAZOLE AND TRIMETHOPRIM 800; 160 MG/1; MG/1
1 TABLET ORAL 2 TIMES DAILY
COMMUNITY
End: 2019-01-01

## 2019-01-01 RX ORDER — FUROSEMIDE 10 MG/ML
40 INJECTION INTRAMUSCULAR; INTRAVENOUS ONCE
Status: COMPLETED | OUTPATIENT
Start: 2019-01-01 | End: 2019-01-01

## 2019-01-01 RX ORDER — FUROSEMIDE 20 MG/1
TABLET ORAL
Start: 2019-01-01

## 2019-01-01 RX ORDER — METOPROLOL TARTRATE 50 MG/1
50 TABLET, FILM COATED ORAL 2 TIMES DAILY
Status: DISCONTINUED | OUTPATIENT
Start: 2019-01-01 | End: 2019-01-01

## 2019-01-01 RX ORDER — AMIODARONE HYDROCHLORIDE 200 MG/1
200 TABLET ORAL
Status: DISCONTINUED | OUTPATIENT
Start: 2019-01-01 | End: 2019-01-01 | Stop reason: HOSPADM

## 2019-01-01 RX ORDER — LORAZEPAM 0.5 MG/1
0.25 TABLET ORAL ONCE
Status: COMPLETED | OUTPATIENT
Start: 2019-01-01 | End: 2019-01-01

## 2019-01-01 RX ORDER — SULFAMETHOXAZOLE AND TRIMETHOPRIM 800; 160 MG/1; MG/1
1 TABLET ORAL DAILY
COMMUNITY
End: 2019-01-01 | Stop reason: HOSPADM

## 2019-01-01 RX ORDER — ATORVASTATIN CALCIUM 40 MG/1
40 TABLET, FILM COATED ORAL NIGHTLY
Status: DISCONTINUED | OUTPATIENT
Start: 2019-01-01 | End: 2019-01-01 | Stop reason: HOSPADM

## 2019-01-01 RX ORDER — SODIUM CHLORIDE 0.9 % (FLUSH) 0.9 %
3 SYRINGE (ML) INJECTION EVERY 12 HOURS SCHEDULED
Status: DISCONTINUED | OUTPATIENT
Start: 2019-01-01 | End: 2019-01-01

## 2019-01-01 RX ORDER — ACETAMINOPHEN 325 MG/1
650 TABLET ORAL EVERY 4 HOURS PRN
Start: 2019-01-01

## 2019-01-01 RX ORDER — SODIUM CHLORIDE 0.9 % (FLUSH) 0.9 %
3 SYRINGE (ML) INJECTION EVERY 12 HOURS SCHEDULED
Status: DISCONTINUED | OUTPATIENT
Start: 2019-01-01 | End: 2019-01-01 | Stop reason: HOSPADM

## 2019-01-01 RX ORDER — ALPRAZOLAM 0.25 MG/1
0.25 TABLET ORAL 2 TIMES DAILY PRN
Status: DISCONTINUED | OUTPATIENT
Start: 2019-01-01 | End: 2019-01-01 | Stop reason: HOSPADM

## 2019-01-01 RX ORDER — FUROSEMIDE 10 MG/ML
20 INJECTION INTRAMUSCULAR; INTRAVENOUS DAILY
Status: DISCONTINUED | OUTPATIENT
Start: 2019-01-01 | End: 2019-01-01

## 2019-01-01 RX ORDER — TEMAZEPAM 15 MG/1
15 CAPSULE ORAL NIGHTLY PRN
Status: DISCONTINUED | OUTPATIENT
Start: 2019-01-01 | End: 2019-01-01

## 2019-01-01 RX ORDER — MAGNESIUM SULFATE HEPTAHYDRATE 40 MG/ML
2 INJECTION, SOLUTION INTRAVENOUS ONCE
Status: COMPLETED | OUTPATIENT
Start: 2019-01-01 | End: 2019-01-01

## 2019-01-01 RX ORDER — BISACODYL 10 MG
10 SUPPOSITORY, RECTAL RECTAL DAILY PRN
Status: DISCONTINUED | OUTPATIENT
Start: 2019-01-01 | End: 2019-01-01 | Stop reason: HOSPADM

## 2019-01-01 RX ORDER — IPRATROPIUM BROMIDE AND ALBUTEROL SULFATE 2.5; .5 MG/3ML; MG/3ML
3 SOLUTION RESPIRATORY (INHALATION) EVERY 4 HOURS PRN
Status: DISCONTINUED | OUTPATIENT
Start: 2019-01-01 | End: 2019-01-01 | Stop reason: HOSPADM

## 2019-01-01 RX ORDER — ALPRAZOLAM 0.25 MG/1
0.25 TABLET ORAL 3 TIMES DAILY PRN
Qty: 9 TABLET | Refills: 0 | Status: SHIPPED | OUTPATIENT
Start: 2019-01-01

## 2019-01-01 RX ORDER — HYDROCODONE BITARTRATE AND ACETAMINOPHEN 5; 325 MG/1; MG/1
1 TABLET ORAL EVERY 6 HOURS PRN
Status: DISCONTINUED | OUTPATIENT
Start: 2019-01-01 | End: 2019-01-01 | Stop reason: HOSPADM

## 2019-01-01 RX ORDER — ALPRAZOLAM 0.25 MG/1
0.25 TABLET ORAL ONCE
Status: COMPLETED | OUTPATIENT
Start: 2019-01-01 | End: 2019-01-01

## 2019-01-01 RX ORDER — MIRTAZAPINE 15 MG/1
30 TABLET, FILM COATED ORAL EVERY EVENING
Status: DISCONTINUED | OUTPATIENT
Start: 2019-01-01 | End: 2019-01-01 | Stop reason: HOSPADM

## 2019-01-01 RX ORDER — FUROSEMIDE 20 MG/1
20 TABLET ORAL 3 TIMES WEEKLY
Start: 2019-01-01

## 2019-01-01 RX ORDER — METOPROLOL TARTRATE 50 MG/1
50 TABLET, FILM COATED ORAL 2 TIMES DAILY
Status: DISCONTINUED | OUTPATIENT
Start: 2019-01-01 | End: 2019-01-01 | Stop reason: HOSPADM

## 2019-01-01 RX ORDER — ACETAMINOPHEN 325 MG/1
650 TABLET ORAL EVERY 4 HOURS PRN
Status: DISCONTINUED | OUTPATIENT
Start: 2019-01-01 | End: 2019-01-01 | Stop reason: HOSPADM

## 2019-01-01 RX ORDER — FUROSEMIDE 10 MG/ML
40 INJECTION INTRAMUSCULAR; INTRAVENOUS DAILY
Status: DISCONTINUED | OUTPATIENT
Start: 2019-01-01 | End: 2019-01-01

## 2019-01-01 RX ORDER — LEVOTHYROXINE SODIUM 0.1 MG/1
100 TABLET ORAL DAILY
Status: DISCONTINUED | OUTPATIENT
Start: 2019-01-01 | End: 2019-01-01

## 2019-01-01 RX ORDER — ASPIRIN 81 MG/1
81 TABLET ORAL DAILY
Status: DISCONTINUED | OUTPATIENT
Start: 2019-01-01 | End: 2019-01-01 | Stop reason: HOSPADM

## 2019-01-01 RX ORDER — POTASSIUM CHLORIDE 20 MEQ/1
40 TABLET, EXTENDED RELEASE ORAL 2 TIMES DAILY WITH MEALS
Status: DISCONTINUED | OUTPATIENT
Start: 2019-01-01 | End: 2019-01-01

## 2019-01-01 RX ADMIN — SODIUM CHLORIDE, PRESERVATIVE FREE 3 ML: 5 INJECTION INTRAVENOUS at 20:02

## 2019-01-01 RX ADMIN — AMIODARONE HYDROCHLORIDE 200 MG: 200 TABLET ORAL at 10:27

## 2019-01-01 RX ADMIN — APIXABAN 2.5 MG: 2.5 TABLET, FILM COATED ORAL at 20:02

## 2019-01-01 RX ADMIN — ACETAMINOPHEN 650 MG: 325 TABLET, FILM COATED ORAL at 20:32

## 2019-01-01 RX ADMIN — MIRTAZAPINE 30 MG: 15 TABLET, FILM COATED ORAL at 22:31

## 2019-01-01 RX ADMIN — TEMAZEPAM 15 MG: 15 CAPSULE ORAL at 19:59

## 2019-01-01 RX ADMIN — POTASSIUM CHLORIDE 40 MEQ: 1500 TABLET, EXTENDED RELEASE ORAL at 17:33

## 2019-01-01 RX ADMIN — ASPIRIN 81 MG: 81 TABLET, COATED ORAL at 08:57

## 2019-01-01 RX ADMIN — ALBUTEROL SULFATE 10 MG: 2.5 SOLUTION RESPIRATORY (INHALATION) at 16:11

## 2019-01-01 RX ADMIN — FUROSEMIDE 40 MG: 10 INJECTION, SOLUTION INTRAMUSCULAR; INTRAVENOUS at 13:43

## 2019-01-01 RX ADMIN — HUMAN INSULIN 10 UNITS: 100 INJECTION, SOLUTION SUBCUTANEOUS at 17:27

## 2019-01-01 RX ADMIN — APIXABAN 2.5 MG: 2.5 TABLET, FILM COATED ORAL at 20:43

## 2019-01-01 RX ADMIN — OXYBUTYNIN CHLORIDE 5 MG: 5 TABLET, EXTENDED RELEASE ORAL at 10:30

## 2019-01-01 RX ADMIN — SODIUM POLYSTYRENE SULFONATE 15 G: 15 SUSPENSION ORAL; RECTAL at 09:35

## 2019-01-01 RX ADMIN — METOPROLOL TARTRATE 50 MG: 50 TABLET ORAL at 10:27

## 2019-01-01 RX ADMIN — HYDROCODONE BITARTRATE AND ACETAMINOPHEN 1 TABLET: 5; 325 TABLET ORAL at 21:18

## 2019-01-01 RX ADMIN — PANTOPRAZOLE SODIUM 40 MG: 40 TABLET, DELAYED RELEASE ORAL at 06:30

## 2019-01-01 RX ADMIN — SODIUM CHLORIDE, PRESERVATIVE FREE 3 ML: 5 INJECTION INTRAVENOUS at 22:00

## 2019-01-01 RX ADMIN — METOPROLOL TARTRATE 50 MG: 50 TABLET ORAL at 20:06

## 2019-01-01 RX ADMIN — ENOXAPARIN SODIUM 50 MG: 60 INJECTION SUBCUTANEOUS at 12:04

## 2019-01-01 RX ADMIN — AMIODARONE HYDROCHLORIDE 200 MG: 200 TABLET ORAL at 09:35

## 2019-01-01 RX ADMIN — DILTIAZEM HYDROCHLORIDE 180 MG: 180 CAPSULE, COATED, EXTENDED RELEASE ORAL at 10:27

## 2019-01-01 RX ADMIN — PANTOPRAZOLE SODIUM 40 MG: 40 TABLET, DELAYED RELEASE ORAL at 06:23

## 2019-01-01 RX ADMIN — SODIUM CHLORIDE, PRESERVATIVE FREE 3 ML: 5 INJECTION INTRAVENOUS at 09:35

## 2019-01-01 RX ADMIN — ASPIRIN 81 MG: 81 TABLET, COATED ORAL at 09:37

## 2019-01-01 RX ADMIN — LEVOTHYROXINE SODIUM 100 MCG: 100 TABLET ORAL at 06:34

## 2019-01-01 RX ADMIN — ALPRAZOLAM 0.25 MG: 0.25 TABLET ORAL at 18:10

## 2019-01-01 RX ADMIN — LEVOTHYROXINE SODIUM 100 MCG: 100 TABLET ORAL at 06:29

## 2019-01-01 RX ADMIN — MAGNESIUM SULFATE HEPTAHYDRATE 2 G: 40 INJECTION, SOLUTION INTRAVENOUS at 20:42

## 2019-01-01 RX ADMIN — LEVOTHYROXINE SODIUM 100 MCG: 100 TABLET ORAL at 06:48

## 2019-01-01 RX ADMIN — HYDROCODONE BITARTRATE AND ACETAMINOPHEN 1 TABLET: 5; 325 TABLET ORAL at 03:15

## 2019-01-01 RX ADMIN — APIXABAN 2.5 MG: 2.5 TABLET, FILM COATED ORAL at 09:35

## 2019-01-01 RX ADMIN — ATORVASTATIN CALCIUM 40 MG: 40 TABLET, FILM COATED ORAL at 20:43

## 2019-01-01 RX ADMIN — ASPIRIN 81 MG: 81 TABLET, COATED ORAL at 10:26

## 2019-01-01 RX ADMIN — AMIODARONE HYDROCHLORIDE 150 MG: 1.5 INJECTION, SOLUTION INTRAVENOUS at 20:34

## 2019-01-01 RX ADMIN — METOPROLOL TARTRATE 50 MG: 50 TABLET ORAL at 21:18

## 2019-01-01 RX ADMIN — SODIUM CHLORIDE, PRESERVATIVE FREE 3 ML: 5 INJECTION INTRAVENOUS at 09:24

## 2019-01-01 RX ADMIN — AMIODARONE HYDROCHLORIDE 0.5 MG/MIN: 1.8 INJECTION, SOLUTION INTRAVENOUS at 02:54

## 2019-01-01 RX ADMIN — DILTIAZEM HYDROCHLORIDE 180 MG: 180 CAPSULE, COATED, EXTENDED RELEASE ORAL at 09:35

## 2019-01-01 RX ADMIN — SODIUM CHLORIDE 50 ML/HR: 9 INJECTION, SOLUTION INTRAVENOUS at 11:53

## 2019-01-01 RX ADMIN — AMIODARONE HYDROCHLORIDE 200 MG: 200 TABLET ORAL at 08:57

## 2019-01-01 RX ADMIN — AMOXICILLIN 500 MG: 500 CAPSULE ORAL at 10:30

## 2019-01-01 RX ADMIN — AMIODARONE HYDROCHLORIDE 200 MG: 200 TABLET ORAL at 21:46

## 2019-01-01 RX ADMIN — AMIODARONE HYDROCHLORIDE 200 MG: 200 TABLET ORAL at 09:22

## 2019-01-01 RX ADMIN — AMIODARONE HYDROCHLORIDE 1 MG/MIN: 1.8 INJECTION, SOLUTION INTRAVENOUS at 21:01

## 2019-01-01 RX ADMIN — OXYBUTYNIN CHLORIDE 5 MG: 5 TABLET, EXTENDED RELEASE ORAL at 08:48

## 2019-01-01 RX ADMIN — POTASSIUM CHLORIDE 40 MEQ: 1500 TABLET, EXTENDED RELEASE ORAL at 09:22

## 2019-01-01 RX ADMIN — METOPROLOL TARTRATE 50 MG: 50 TABLET ORAL at 10:30

## 2019-01-01 RX ADMIN — ASPIRIN 81 MG: 81 TABLET, COATED ORAL at 08:49

## 2019-01-01 RX ADMIN — METOPROLOL TARTRATE 50 MG: 50 TABLET ORAL at 20:02

## 2019-01-01 RX ADMIN — METOPROLOL TARTRATE 50 MG: 50 TABLET ORAL at 09:22

## 2019-01-01 RX ADMIN — DILTIAZEM HYDROCHLORIDE 180 MG: 180 CAPSULE, COATED, EXTENDED RELEASE ORAL at 08:57

## 2019-01-01 RX ADMIN — METOPROLOL TARTRATE 50 MG: 50 TABLET ORAL at 09:34

## 2019-01-01 RX ADMIN — MIRTAZAPINE 30 MG: 15 TABLET, FILM COATED ORAL at 20:43

## 2019-01-01 RX ADMIN — DILTIAZEM HYDROCHLORIDE 180 MG: 180 CAPSULE, COATED, EXTENDED RELEASE ORAL at 08:49

## 2019-01-01 RX ADMIN — PANTOPRAZOLE SODIUM 40 MG: 40 TABLET, DELAYED RELEASE ORAL at 06:48

## 2019-01-01 RX ADMIN — OXYBUTYNIN CHLORIDE 5 MG: 5 TABLET, EXTENDED RELEASE ORAL at 09:35

## 2019-01-01 RX ADMIN — LORAZEPAM 0.25 MG: 0.5 TABLET ORAL at 16:00

## 2019-01-01 RX ADMIN — METOPROLOL TARTRATE 50 MG: 50 TABLET ORAL at 09:37

## 2019-01-01 RX ADMIN — ATORVASTATIN CALCIUM 40 MG: 40 TABLET, FILM COATED ORAL at 20:06

## 2019-01-01 RX ADMIN — FUROSEMIDE 40 MG: 10 INJECTION, SOLUTION INTRAMUSCULAR; INTRAVENOUS at 14:47

## 2019-01-01 RX ADMIN — OXYBUTYNIN CHLORIDE 5 MG: 5 TABLET, EXTENDED RELEASE ORAL at 10:27

## 2019-01-01 RX ADMIN — HYDROCODONE BITARTRATE AND ACETAMINOPHEN 1 TABLET: 5; 325 TABLET ORAL at 11:52

## 2019-01-01 RX ADMIN — METOPROLOL TARTRATE 50 MG: 50 TABLET ORAL at 20:43

## 2019-01-01 RX ADMIN — AMIODARONE HYDROCHLORIDE 200 MG: 200 TABLET ORAL at 08:48

## 2019-01-01 RX ADMIN — ACETAMINOPHEN 650 MG: 325 TABLET, FILM COATED ORAL at 22:06

## 2019-01-01 RX ADMIN — SODIUM CHLORIDE, PRESERVATIVE FREE 3 ML: 5 INJECTION INTRAVENOUS at 21:50

## 2019-01-01 RX ADMIN — LEVOTHYROXINE SODIUM 100 MCG: 100 TABLET ORAL at 06:37

## 2019-01-01 RX ADMIN — METOPROLOL TARTRATE 50 MG: 50 TABLET ORAL at 20:32

## 2019-01-01 RX ADMIN — DILTIAZEM HYDROCHLORIDE 5 MG/HR: 5 INJECTION INTRAVENOUS at 11:01

## 2019-01-01 RX ADMIN — PANTOPRAZOLE SODIUM 40 MG: 40 TABLET, DELAYED RELEASE ORAL at 05:51

## 2019-01-01 RX ADMIN — DILTIAZEM HYDROCHLORIDE 180 MG: 180 CAPSULE, COATED, EXTENDED RELEASE ORAL at 10:30

## 2019-01-01 RX ADMIN — MIRTAZAPINE 30 MG: 15 TABLET, FILM COATED ORAL at 20:32

## 2019-01-01 RX ADMIN — METOPROLOL TARTRATE 50 MG: 50 TABLET ORAL at 22:31

## 2019-01-01 RX ADMIN — PANTOPRAZOLE SODIUM 40 MG: 40 TABLET, DELAYED RELEASE ORAL at 06:37

## 2019-01-01 RX ADMIN — ASPIRIN 81 MG: 81 TABLET, COATED ORAL at 09:30

## 2019-01-01 RX ADMIN — ATORVASTATIN CALCIUM 40 MG: 40 TABLET, FILM COATED ORAL at 08:57

## 2019-01-01 RX ADMIN — ATORVASTATIN CALCIUM 40 MG: 40 TABLET, FILM COATED ORAL at 20:02

## 2019-01-01 RX ADMIN — MIRTAZAPINE 30 MG: 15 TABLET, FILM COATED ORAL at 20:06

## 2019-01-01 RX ADMIN — TEMAZEPAM 15 MG: 15 CAPSULE ORAL at 21:58

## 2019-01-01 RX ADMIN — POTASSIUM CHLORIDE 40 MEQ: 1500 TABLET, EXTENDED RELEASE ORAL at 20:43

## 2019-01-01 RX ADMIN — PANTOPRAZOLE SODIUM 40 MG: 40 TABLET, DELAYED RELEASE ORAL at 06:34

## 2019-01-01 RX ADMIN — Medication 1 DOSE: at 15:40

## 2019-01-01 RX ADMIN — DIPHENHYDRAMINE HYDROCHLORIDE 25 MG: 25 CAPSULE ORAL at 13:38

## 2019-01-01 RX ADMIN — METOPROLOL TARTRATE 50 MG: 50 TABLET ORAL at 08:57

## 2019-01-01 RX ADMIN — APIXABAN 2.5 MG: 2.5 TABLET, FILM COATED ORAL at 21:18

## 2019-01-01 RX ADMIN — APIXABAN 2.5 MG: 2.5 TABLET, FILM COATED ORAL at 08:49

## 2019-01-01 RX ADMIN — ATORVASTATIN CALCIUM 40 MG: 40 TABLET, FILM COATED ORAL at 09:35

## 2019-01-01 RX ADMIN — OXYBUTYNIN CHLORIDE 5 MG: 5 TABLET, EXTENDED RELEASE ORAL at 08:57

## 2019-01-01 RX ADMIN — SODIUM CHLORIDE, PRESERVATIVE FREE 3 ML: 5 INJECTION INTRAVENOUS at 08:58

## 2019-01-01 RX ADMIN — OXYBUTYNIN CHLORIDE 5 MG: 5 TABLET, EXTENDED RELEASE ORAL at 09:22

## 2019-01-01 RX ADMIN — MIRTAZAPINE 30 MG: 15 TABLET, FILM COATED ORAL at 21:18

## 2019-01-01 RX ADMIN — DILTIAZEM HYDROCHLORIDE 180 MG: 180 CAPSULE, COATED, EXTENDED RELEASE ORAL at 09:36

## 2019-01-01 RX ADMIN — MIRTAZAPINE 30 MG: 15 TABLET, FILM COATED ORAL at 20:01

## 2019-01-01 RX ADMIN — APIXABAN 2.5 MG: 2.5 TABLET, FILM COATED ORAL at 09:22

## 2019-01-01 RX ADMIN — ASPIRIN 81 MG: 81 TABLET, COATED ORAL at 09:35

## 2019-01-01 RX ADMIN — LEVOTHYROXINE SODIUM 112 MCG: 100 TABLET ORAL at 08:57

## 2019-01-01 RX ADMIN — DEXTROSE MONOHYDRATE 50 ML: 25 INJECTION, SOLUTION INTRAVENOUS at 17:27

## 2019-01-01 RX ADMIN — SODIUM CHLORIDE 75 ML/HR: 9 INJECTION, SOLUTION INTRAVENOUS at 15:57

## 2019-01-01 RX ADMIN — AMIODARONE HYDROCHLORIDE 200 MG: 200 TABLET ORAL at 10:30

## 2019-01-01 RX ADMIN — METOPROLOL TARTRATE 50 MG: 50 TABLET ORAL at 08:48

## 2019-01-01 RX ADMIN — FUROSEMIDE 20 MG: 10 INJECTION, SOLUTION INTRAMUSCULAR; INTRAVENOUS at 09:37

## 2019-01-01 RX ADMIN — ATORVASTATIN CALCIUM 40 MG: 40 TABLET, FILM COATED ORAL at 21:18

## 2019-01-01 RX ADMIN — SODIUM POLYSTYRENE SULFONATE 15 G: 15 SUSPENSION ORAL; RECTAL at 12:00

## 2019-01-01 RX ADMIN — ONDANSETRON 4 MG: 2 INJECTION INTRAMUSCULAR; INTRAVENOUS at 23:44

## 2019-01-01 RX ADMIN — LEVOTHYROXINE SODIUM 112 MCG: 100 TABLET ORAL at 09:36

## 2019-01-01 RX ADMIN — SODIUM CHLORIDE 75 ML/HR: 9 INJECTION, SOLUTION INTRAVENOUS at 10:29

## 2019-01-01 RX ADMIN — ASPIRIN 81 MG 324 MG: 81 TABLET ORAL at 12:02

## 2019-01-01 RX ADMIN — SODIUM CHLORIDE, PRESERVATIVE FREE 3 ML: 5 INJECTION INTRAVENOUS at 22:31

## 2019-01-01 RX ADMIN — FUROSEMIDE 40 MG: 10 INJECTION, SOLUTION INTRAMUSCULAR; INTRAVENOUS at 12:06

## 2019-01-01 RX ADMIN — ATORVASTATIN CALCIUM 40 MG: 40 TABLET, FILM COATED ORAL at 20:32

## 2019-01-01 RX ADMIN — MIRTAZAPINE 30 MG: 15 TABLET, FILM COATED ORAL at 17:35

## 2019-01-01 RX ADMIN — METOPROLOL TARTRATE 50 MG: 50 TABLET ORAL at 22:00

## 2019-02-01 PROBLEM — I50.9 ACUTE CONGESTIVE HEART FAILURE (HCC): Status: ACTIVE | Noted: 2019-01-01

## 2019-02-01 PROBLEM — I21.4 NSTEMI (NON-ST ELEVATED MYOCARDIAL INFARCTION) (HCC): Status: ACTIVE | Noted: 2019-01-01

## 2019-02-01 PROBLEM — I48.91 ATRIAL FIBRILLATION WITH RVR (HCC): Status: ACTIVE | Noted: 2019-01-01

## 2019-02-01 NOTE — ED NOTES
Called Dr. Ramirez per Dr. Norris. Call was transferred @ this time.      Rachel Angeles  02/01/19 1138

## 2019-02-01 NOTE — ED NOTES
Called Dr. Bravo per Dr. Norris. Call was transferred @ this time.      Rachel Angeles  02/01/19 1123

## 2019-02-01 NOTE — ED NOTES
Nuclear medicine called and stated that it will be around 0849-0025 before the medicine is available for VQ scan.       Kelsie Watson RN  02/01/19 [id number]

## 2019-02-01 NOTE — H&P
[facility]   HISTORY AND PHYSICAL      Name:  Jose M Green   Age:  87 y.o.  Sex:  female  :  1931  MRN:  [id number]   Visit Number:  [id number]  Admission Date:  2019  Date Of Service:  19  Primary Care Physician:  Speedy Bravo MD    Chief Complaint:     87-year-old female with long-standing history of coronary artery disease, paroxysmal atrial fibrillation brought into the emergency room with increasing shortness of breath without associated fever or chills    History Of Presenting Illness:    Patient presented to the ER with shortness of breath noted to have a markedly elevated BNP first set of troponin level was elevated. She denied chest pain and denied lightheadedness dizziness. No associated fever or chills  She was noted to be in atrial fibrillation with a rapid ventricular rate of around 130 bpm. Given a diltiazem bolus and now is on a Cardizem drip. She was given IV Lasix with good diuresis and significant improvement in her O2 saturation.  Chest x-ray without evidence of infiltrate. VQ scan shows low probability for PE.  Currently she is resting comfortably on O2 at 3 L via nasal cannula maintaining an O2 sat up to 98%      Review Of Systems:   Review of Systems   Constitutional: Positive for fatigue. Negative for activity change, appetite change and fever.   HENT: Negative for congestion, ear discharge, ear pain and trouble swallowing.    Eyes: Negative for photophobia and visual disturbance.   Respiratory: Positive for cough and shortness of breath.    Cardiovascular: Negative for chest pain and palpitations.   Gastrointestinal: Negative for abdominal distention, abdominal pain, constipation, diarrhea, nausea and vomiting.   Endocrine: Negative.    Genitourinary: Positive for difficulty urinating. Negative for dysuria, hematuria and urgency.   Musculoskeletal: Positive for arthralgias and gait problem. Negative for back pain, joint swelling and  myalgias.   Skin: Negative for color change and rash.   Allergic/Immunologic: Negative.    Neurological: Negative for dizziness, weakness, light-headedness and headaches.   Hematological: Negative for adenopathy. Does not bruise/bleed easily.   Psychiatric/Behavioral: Positive for sleep disturbance. Negative for agitation, confusion and dysphoric mood. The patient is nervous/anxious.         Past Medical History:    Past Medical History:   Diagnosis Date   • Chronic lymphocytic leukemia (CMS/HCC)    • CLL (chronic lymphocytic leukemia) (CMS/HCC)    • DVT (deep venous thrombosis) (CMS/HCC)    • Inguinal hernia    • Kidney infection        Past Surgical history:    Past Surgical History:   Procedure Laterality Date   • CARDIOVERSION     •  SECTION     • CYSTOSCOPY BLADDER BIOPSY N/A 2017    Procedure: CYSTOSCOPY BLADDER BIOPSY;  Surgeon: Obed Avalos MD;  Location: [facility];  Service:    • CYSTOSCOPY CYSTOGRAM N/A 2017    Procedure: CYSTOSCOPY CYSTOGRAM, EVACUATION OF BLADDER CLOTS;  Surgeon: Obed Avalos MD;  Location: [facility];  Service:    • HYSTERECTOMY     • OTHER SURGICAL HISTORY      Both Arm Plates   • VARICOSE VEIN SURGERY         Social History:  Pediatric History   Patient Guardian Status   • Not on file     Other Topics Concern   • Not on file   Social History Narrative   • Not on file       Family History:    Family History   Problem Relation Age of Onset   • Arthritis Mother    • Heart disease Mother    • Hypertension Mother    • Kidney disease Mother    • Heart disease Father    • Hypertension Father    • Arthritis Sister    • COPD Sister    • Heart disease Sister        Allergies:      Codeine; Contrast dye; and Aspirin    Home Medications:    Prior to Admission Medications     Prescriptions Last Dose Informant Patient Reported? Taking?    aspirin 81 MG EC tablet 2019  No Yes    Take 1 tablet by mouth Daily.    atorvastatin (LIPITOR) 40 MG tablet 2019  No Yes     Take 1 tablet by mouth Daily.    docusate sodium (DOK) 100 MG capsule 1/31/2019  No Yes    Take 1 capsule by mouth 2 (Two) Times a Day As Needed for Constipation.    levothyroxine (SYNTHROID, LEVOTHROID) 100 MCG tablet 1/31/2019  No Yes    Take 1 tablet by mouth Daily. 200001    metoprolol tartrate (LOPRESSOR) 50 MG tablet 1/31/2019  No Yes    Take 1 tablet by mouth 2 (Two) Times a Day.    Mirabegron ER (MYRBETRIQ) 25 MG tablet sustained-release 24 hour 24 hr tablet 1/31/2019  No Yes    Take 1 tablet by mouth Daily.    mirtazapine (REMERON) 30 MG tablet 1/31/2019  No Yes    TAKE 1 TABLET BY MOUTH IN THE EVENING     omeprazole (priLOSEC) 20 MG capsule 1/31/2019  No Yes    Take 1 capsule by mouth Daily.             ED Medications:    Medications   diltiaZEM (CARDIZEM) 125 mg in sodium chloride 0.9 % 125 mL (1 mg/mL) infusion (10 mg/hr Intravenous Rate/Dose Change 2/1/19 1208)   aspirin EC tablet 81 mg (not administered)   oxybutynin XL (DITROPAN-XL) 24 hr tablet 5 mg (not administered)   levothyroxine (SYNTHROID, LEVOTHROID) tablet 100 mcg (not administered)   pantoprazole (PROTONIX) EC tablet 40 mg (not administered)   atorvastatin (LIPITOR) tablet 40 mg (not administered)   docusate sodium (COLACE) capsule 100 mg (not administered)   metoprolol tartrate (LOPRESSOR) tablet 50 mg (not administered)   mirtazapine (REMERON) tablet 30 mg (not administered)   sodium chloride 0.9 % flush 3 mL (not administered)   sodium chloride 0.9 % flush 3-10 mL (not administered)   enoxaparin (LOVENOX) syringe 30 mg (not administered)   acetaminophen (TYLENOL) tablet 650 mg (not administered)   ALPRAZolam (XANAX) tablet 0.25 mg (not administered)   temazepam (RESTORIL) capsule 15 mg (not administered)   magnesium hydroxide (MILK OF MAGNESIA) suspension 2400 mg/10mL 10 mL (not administered)   diltiazem (CARDIZEM) bolus from bag 1 mg/mL 10 mg (10 mg Intravenous Bolus from Bag 2/1/19 1059)   furosemide (LASIX) injection 40 mg (40 mg  Intravenous Given 2/1/19 1206)   aspirin chewable tablet 324 mg (324 mg Oral Given 2/1/19 1202)   enoxaparin (LOVENOX) syringe 50 mg (50 mg Subcutaneous Given 2/1/19 1204)   LORazepam (ATIVAN) tablet 0.25 mg (0.25 mg Oral Given 2/1/19 1600)   technetium albumin aggregated (MAA) solution 1 dose (1 dose Intravenous Given 2/1/19 1540)       Vital Signs:    Temp:  [98.7 °F (37.1 °C)] 98.7 °F (37.1 °C)  Heart Rate:  [] 96  Resp:  [20] 20  BP: (122-185)/() 152/83    No intake or output data in the 24 hours ending 02/01/19 1759        02/01/19  0942   Weight: 53.1 kg (117 lb)       Body mass index is 19.47 kg/m².    Physical Exam:  Physical Exam   Constitutional: She is oriented to person, place, and time. She appears well-developed and well-nourished. No distress.   HENT:   Nose: Nose normal.   Mouth/Throat: Oropharynx is clear and moist.   Eyes: Conjunctivae and EOM are normal. No scleral icterus.   Neck: No tracheal deviation present. No thyromegaly present.   Cardiovascular: Normal rate. Exam reveals no friction rub.   No murmur heard.  Pulmonary/Chest: No respiratory distress. She has no wheezes. She has rales.   Abdominal: Soft. She exhibits no distension and no mass. There is no tenderness. There is no guarding.   Musculoskeletal: Normal range of motion. She exhibits edema and deformity.   Lymphadenopathy:     She has no cervical adenopathy.   Neurological: She is alert and oriented to person, place, and time. She has normal reflexes. No cranial nerve deficit. Coordination normal.   Skin: Skin is warm and dry. No rash noted. No erythema.   Psychiatric: She has a normal mood and affect. Her behavior is normal. Judgment and thought content normal.       EKG:    Atrial fibrillation with a ventricular rate around 130. No acute ST-T abnormalities    Labs:    Lab Results (last 24 hours)     Procedure Component Value Units Date/Time    Troponin [[id number]]  (Abnormal) Collected:  02/01/19 1032    Specimen:   Blood Updated:  02/01/19 1123     Troponin I 0.746 ng/mL     Narrative:       Normal Patient Upper Reference Limit (URL) (99th Percentile)=0.03 ng/mL   Non-AMI Illness Reference Limit=0.03-0.11 ng/mL   AMI Confirmation=0.12 ng/mL and above    BNP [[id number]]  (Abnormal) Collected:  02/01/19 1032    Specimen:  Blood Updated:  02/01/19 1103     proBNP 18,200.0 pg/mL     Comprehensive Metabolic Panel [[id number]]  (Abnormal) Collected:  02/01/19 1032    Specimen:  Blood Updated:  02/01/19 1101     Glucose 109 mg/dL      BUN 40 mg/dL      Creatinine 1.00 mg/dL      Sodium 138 mmol/L      Potassium 4.5 mmol/L      Chloride 99 mmol/L      CO2 31.0 mmol/L      Calcium 9.4 mg/dL      Total Protein 7.0 g/dL      Albumin 4.00 g/dL      ALT (SGPT) 59 U/L      AST (SGOT) 86 U/L      Alkaline Phosphatase 367 U/L      Total Bilirubin 0.4 mg/dL      eGFR Non African Amer 52 mL/min/1.73      Globulin 3.0 gm/dL      A/G Ratio 1.3 g/dL      BUN/Creatinine Ratio 40.0     Anion Gap 12.5 mmol/L     Narrative:       The MDRD GFR formula is only valid for adults with stable renal function between ages 18 and 70.    D-dimer, Quantitative [[id number]]  (Abnormal) Collected:  02/01/19 0948    Specimen:  Blood Updated:  02/01/19 1033     D-Dimer, Quantitative 1,996 ng/mL (FEU)     CBC & Differential [[id number]] Collected:  02/01/19 0948    Specimen:  Blood Updated:  02/01/19 0958    Narrative:       The following orders were created for panel order CBC & Differential.  Procedure                               Abnormality         Status                     ---------                               -----------         ------                     CBC Auto Differential[[id number]]        Abnormal            Final result                 Please view results for these tests on the individual orders.    CBC Auto Differential [[id number]]  (Abnormal) Collected:  02/01/19 0948    Specimen:  Blood Updated:  02/01/19 0958     WBC 10.18 10*3/mm3      RBC 3.95  10*6/mm3      Hemoglobin 11.4 g/dL      Hematocrit 36.0 %      MCV 91.1 fL      MCH 28.9 pg      MCHC 31.7 g/dL      RDW 15.9 %      RDW-SD 52.6 fl      MPV 10.4 fL      Platelets 258 10*3/mm3      Neutrophil % 77.3 %      Lymphocyte % 14.5 %      Monocyte % 7.2 %      Eosinophil % 0.1 %      Basophil % 0.2 %      Immature Grans % 0.7 %      Neutrophils, Absolute 7.87 10*3/mm3      Lymphocytes, Absolute 1.48 10*3/mm3      Monocytes, Absolute 0.73 10*3/mm3      Eosinophils, Absolute 0.01 10*3/mm3      Basophils, Absolute 0.02 10*3/mm3      Immature Grans, Absolute 0.07 10*3/mm3      nRBC 0.0 /100 WBC           Radiology:    Imaging Results (last 72 hours)     Procedure Component Value Units Date/Time    NM Lung Ventilation Perfusion [[id number]] Collected:  02/01/19 1512     Updated:  02/01/19 1515    Narrative:       PROCEDURE: NM LUNG VENTILATION PERFUSION-     HISTORY: rule out PE; I50.9-Heart failure, unspecified; I21.4-Non-ST  elevation (NSTEMI) myocardial infarction; I48.91-Unspecified atrial  fibrillation     PROCEDURE: The patient was injected with 5.2 mCi of Tc 99m MAA. The  patient was unable to do the ventilation portion of the exam. Images  over the lungs were obtained in multiple projections.     COMPARISON: Chest x-ray dated Today.     FINDINGS: There is homogeneous perfusion of both lungs.           Impression:       Low probability for pulmonary embolus.     This report was finalized on 2/1/2019 3:13 PM by Lori Daugherty M.D..    XR Chest 2 View [[id number]] Collected:  02/01/19 1008     Updated:  02/01/19 1013    Narrative:       PROCEDURE: XR CHEST 2 VW-        HISTORY: dyspnea     COMPARISON: July 15, 2018.     FINDINGS: The heart is enlarged. The mediastinum is unremarkable. There  is interstitial pulmonary edema with right greater than left pleural  effusions as well as elevation of the right hemidiaphragm. There is no  pneumothorax. There are no acute osseous abnormalities.            Impression:       Interstitial pulmonary edema with bilateral pleural  effusions.                 This report was finalized on 2/1/2019 10:09 AM by Lori Daugherty M.D..          Assessment & Plan:    Acute congestive heart failure (CMS/HCC) she has responded well to Lasix. Should see significant improvement with controlled ventricular rate. Cardiology consult has been obtained follow electrolytes    Atrial fibrillation with RVR (CMS/HCC) on diltiazem drip continue with metoprolol she is not a good candidate for long-term anticoagulation therapy in view of frequent recurring falls.    NSTEMI (non-ST elevated myocardial infarction) (CMS/HCC) continue with conservative measures has been given aspirin and a dose of Lovenox continue with beta blockers, statins    Patient does not want aggressive interventional measures no CPR intubation or cardioversion            Speedy Bravo MD  02/01/19  5:59 PM

## 2019-02-01 NOTE — ED NOTES
Dr. Norris notified that patient is in atrial fibrillation with RVR with -151 bpm.       Kelsie Watson RN  02/01/19 8229

## 2019-02-01 NOTE — ED NOTES
Called Dr. Luna per Dr. Norris. Call was transferred @ this time.      Rachel Angeles  02/01/19 1129

## 2019-02-01 NOTE — PLAN OF CARE
Problem: Patient Care Overview  Goal: Plan of Care Review  Outcome: Ongoing (interventions implemented as appropriate)   02/01/19 8367   Coping/Psychosocial   Plan of Care Reviewed With patient     Goal: Individualization and Mutuality  Outcome: Ongoing (interventions implemented as appropriate)    Goal: Discharge Needs Assessment  Outcome: Ongoing (interventions implemented as appropriate)    Goal: Interprofessional Rounds/Family Conf  Outcome: Ongoing (interventions implemented as appropriate)      Problem: Fall Risk (Adult)  Goal: Identify Related Risk Factors and Signs and Symptoms  Outcome: Ongoing (interventions implemented as appropriate)    Goal: Absence of Fall  Outcome: Ongoing (interventions implemented as appropriate)      Problem: Cardiac: Heart Failure (Adult)  Goal: Signs and Symptoms of Listed Potential Problems Will be Absent, Minimized or Managed (Cardiac: Heart Failure)  Outcome: Ongoing (interventions implemented as appropriate)      Problem: Hospitalized Acutely Ill Older (Adult)  Goal: Signs and Symptoms of Listed Potential Problems Will be Absent, Minimized or Managed (Hospitalized Acutely Ill Older)  Outcome: Ongoing (interventions implemented as appropriate)

## 2019-02-01 NOTE — ED NOTES
Patient has bruise noted to medial forehead from fall this week.     Kelsie Watson, RN  02/01/19 8886

## 2019-02-02 NOTE — PROGRESS NOTES
"   LOS: 1 day   Patient Care Team:  Speedy Bravo MD as PCP - General  Speedy Bravo MD as PCP - Family Medicine  Speedy Bravo MD as PCP - Claims Attributed  Speedy Bravo MD      Interval History: Patient feels much better feels like she is able to breathe a lot better.  She is smiling and talking this morning.      Review of Systems:   Pertinent items are noted in HPI.      Objective     Vital Sign Min/Max for last 24 hours  Temp  Min: 98.1 °F (36.7 °C)  Max: 98.7 °F (37.1 °C)   BP  Min: 82/57  Max: 185/123   Pulse  Min: 89  Max: 149   Resp  Min: 16  Max: 20   SpO2  Min: 82 %  Max: 100 %   Flow (L/min)  Min: 2  Max: 4   Weight  Min: 53.1 kg (117 lb)  Max: 53.1 kg (117 lb 1 oz)     Flowsheet Rows      First Filed Value   Admission Height  165.1 cm (65\") Documented at 02/01/2019 0942   Admission Weight  53.1 kg (117 lb) Documented at 02/01/2019 0942          Physical Exam:     General Appearance:    Alert, cooperative, in no acute distress   Head:    Normocephalic, without obvious abnormality, atraumatic   Eyes:            Lids and lashes normal, conjunctivae and sclerae normal, no   icterus, no pallor, corneas clear, PERRLA   Ears:    Ears appear intact with no abnormalities noted   Throat:   No oral lesions, no thrush, oral mucosa moist   Neck:   No adenopathy, supple, trachea midline, no thyromegaly, no     carotid bruit, no JVD   Back:     No kyphosis present, no scoliosis present, no skin lesions,       erythema or scars, no tenderness to percussion or                   palpation,   range of motion normal   Lungs:       Heart:    Regular rhythm and normal rate, normal S1 and S2, no            murmur, no gallop, no rub, no click   Breast Exam:    Deferred   Abdomen:     Normal bowel sounds, no masses, no organomegaly, soft        non-tender, non-distended, no guarding, no rebound                 tenderness   Genitalia:    Deferred   Extremities:   Moves all extremities well, no edema, no cyanosis, " no              redness   Pulses:   Pulses palpable and equal bilaterally   Skin:   No bleeding, bruising or rash   Lymph nodes:   No palpable adenopathy   Neurologic:   Cranial nerves 2 - 12 grossly intact, sensation intact, DTR        present and equal bilaterally        Results Review:     I reviewed the patient's new clinical results.    Results Review:   I reviewed the patient's new clinical results.    Telemetry: Average heart rate 108 beats a minute.  No pauses noted.    LAB DATA :           Laboratory results:    Results from last 7 days   Lab Units 02/02/19  0635 02/01/19  1824 02/01/19  1032   SODIUM mmol/L 137 137 138   POTASSIUM mmol/L 4.7 3.7 4.5   CHLORIDE mmol/L 101 98 99   CO2 mmol/L 30.0 30.0 31.0*   BUN mg/dL 37* 38* 40*   CREATININE mg/dL 1.10 0.90 1.00   CALCIUM mg/dL 8.7 8.9 9.4   BILIRUBIN mg/dL  --   --  0.4   ALK PHOS U/L  --   --  367*   ALT (SGPT) U/L  --   --  59   AST (SGOT) U/L  --   --  86*   GLUCOSE mg/dL 98 92 109*     Results from last 7 days   Lab Units 02/02/19  0635 02/01/19 1824 02/01/19  0948   WBC 10*3/mm3 10.96* 9.04 10.18   HEMOGLOBIN g/dL 11.8* 11.5* 11.4*   HEMATOCRIT % 38.4 37.2 36.0*   PLATELETS 10*3/mm3 195 217 258                 Results from last 7 days   Lab Units 02/01/19  1032   TROPONIN I ng/mL 0.746*                 No results found for: HGBA1C  Results from last 7 days   Lab Units 02/01/19  1824   TSH mIU/mL 6.630*           IMAGING DATA:     Xr Chest 2 View    Result Date: 2/1/2019  Narrative: PROCEDURE: XR CHEST 2 VW-    HISTORY: dyspnea  COMPARISON: July 15, 2018.  FINDINGS: The heart is enlarged. The mediastinum is unremarkable. There is interstitial pulmonary edema with right greater than left pleural effusions as well as elevation of the right hemidiaphragm. There is no pneumothorax. There are no acute osseous abnormalities.         Impression: Interstitial pulmonary edema with bilateral pleural effusions.      This report was finalized on 2/1/2019 10:09  AM by Lori Daugherty M.D..    Nm Lung Ventilation Perfusion    Result Date: 2/1/2019  Narrative: PROCEDURE: NM LUNG VENTILATION PERFUSION-  HISTORY: rule out PE; I50.9-Heart failure, unspecified; I21.4-Non-ST elevation (NSTEMI) myocardial infarction; I48.91-Unspecified atrial fibrillation  PROCEDURE: The patient was injected with 5.2 mCi of Tc 99m MAA. The patient was unable to do the ventilation portion of the exam. Images over the lungs were obtained in multiple projections.  COMPARISON: Chest x-ray dated Today.  FINDINGS: There is homogeneous perfusion of both lungs.         Impression: Low probability for pulmonary embolus.  This report was finalized on 2/1/2019 3:13 PM by Lori Daugherty M.D..            Assessment/Plan    #1 atrial fibrillation:  Patient's heart rate seems to be doing marginally better.  I anticipate further improvement with completion of the IV infusion.  Would persist with present course.  She feels significantly better clinically.  Continue beta blocker therapy as he is.    #2 coronary artery disease:  Has a high risk profile.  The LV function by the bedside echo appeared to be normal.  Would persist with medical therapy at this time.    #3 anticoagulation therapy:  Has been initiated on the same.    #4 cor pulmonale:  She does have severe pulmonary hypertension with the dilated RV and borderline RV systolic function.  Conservative management to continue.  This is a long-standing issue with her.        Acute congestive heart failure (CMS/HCC)    Atrial fibrillation with RVR (CMS/HCC)    NSTEMI (non-ST elevated myocardial infarction) (CMS/[facility])            Jeff Ramirez MD  02/02/19  8:23 AM

## 2019-02-02 NOTE — PROGRESS NOTES
[facility]    PROGRESS NOTE    Name:  Jose M Green   Age:  87 y.o.  Sex:  female  :  1931  MRN:  [id number]   Visit Number:  [id number]  Admission Date:  2019  Date Of Service:  19  Primary Care Physician:  Speedy Bravo MD     LOS: 1 day :  Patient Care Team:  Speedy Bravo MD as PCP - General  Speedy Bravo MD as PCP - Family Medicine  Speedy Bravo MD as PCP - Claims Attributed  Speedy Bravo MD:    Chief Complaint:      Cough and shortness of breath    Subjective / Interval History:     Appears to be doing much better she is awake and alert has managed some breakfast. Continues to have a cough without sputum production. Heart rate is significantly better    Review of Systems:   Review of Systems   Constitutional: Positive for fatigue. Negative for activity change, appetite change and fever.   HENT: Negative for congestion, ear discharge, ear pain and trouble swallowing.    Eyes: Negative for photophobia and visual disturbance.   Respiratory: Positive for cough and shortness of breath.    Cardiovascular: Positive for palpitations. Negative for chest pain.   Gastrointestinal: Negative for abdominal distention, abdominal pain, constipation, diarrhea, nausea and vomiting.   Endocrine: Negative.    Genitourinary: Negative for dysuria, hematuria and urgency.   Musculoskeletal: Positive for arthralgias and gait problem. Negative for back pain, joint swelling and myalgias.   Skin: Negative for color change and rash.   Allergic/Immunologic: Negative.    Neurological: Negative for dizziness, weakness, light-headedness and headaches.   Hematological: Negative for adenopathy. Does not bruise/bleed easily.   Psychiatric/Behavioral: Positive for sleep disturbance. Negative for agitation, confusion and dysphoric mood. The patient is nervous/anxious.          Vital Signs:    Temp:  [98.1 °F (36.7 °C)-98.7 °F (37.1 °C)] 98.2 °F (36.8 °C)  Heart Rate:  []  108  Resp:  [16-20] 16  BP: ()/() 128/80    Intake and output:      Intake/Output Summary (Last 24 hours) at 2/2/2019 0914  Last data filed at 2/2/2019 0826  Gross per 24 hour   Intake 1200 ml   Output 100 ml   Net 1100 ml     I/O this shift:  In: 480 [P.O.:480]  Out: -     Physical Examination:  Physical Exam   Constitutional: She is oriented to person, place, and time. She appears well-developed and well-nourished. No distress.   HENT:   Nose: Nose normal.   Mouth/Throat: Oropharynx is clear and moist.   Eyes: Conjunctivae and EOM are normal. No scleral icterus.   Neck: No tracheal deviation present. No thyromegaly present.   Cardiovascular: Normal rate. Exam reveals no friction rub.   Murmur heard.  Pulmonary/Chest: No respiratory distress. She has no wheezes. She has rales.   Abdominal: Soft. She exhibits no distension and no mass. There is no tenderness. There is no guarding.   Musculoskeletal: Normal range of motion. She exhibits deformity.   Lymphadenopathy:     She has no cervical adenopathy.   Neurological: She is alert and oriented to person, place, and time. She has normal reflexes. No cranial nerve deficit. Coordination normal.   Skin: Skin is warm and dry. No rash noted. No erythema.   Psychiatric: She has a normal mood and affect. Her behavior is normal. Judgment and thought content normal.         Laboratory results:    Lab Results (last 24 hours)     Procedure Component Value Units Date/Time    Troponin [[id number]]  (Abnormal) Collected:  02/02/19 0635    Specimen:  Blood Updated:  02/02/19 0903     Troponin I 0.441 ng/mL     Narrative:       Normal Patient Upper Reference Limit (URL) (99th Percentile)=0.03 ng/mL   Non-AMI Illness Reference Limit=0.03-0.11 ng/mL   AMI Confirmation=0.12 ng/mL and above    Basic Metabolic Panel [[id number]]  (Abnormal) Collected:  02/02/19 0635    Specimen:  Blood Updated:  02/02/19 0659     Glucose 98 mg/dL      BUN 37 mg/dL      Creatinine 1.10 mg/dL       Sodium 137 mmol/L      Potassium 4.7 mmol/L      Chloride 101 mmol/L      CO2 30.0 mmol/L      Calcium 8.7 mg/dL      eGFR Non African Amer 47 mL/min/1.73      BUN/Creatinine Ratio 33.6     Anion Gap 10.7 mmol/L     Narrative:       The MDRD GFR formula is only valid for adults with stable renal function between ages 18 and 70.    CBC & Differential [[id number]] Collected:  02/02/19 0635    Specimen:  Blood Updated:  02/02/19 0646    Narrative:       The following orders were created for panel order CBC & Differential.  Procedure                               Abnormality         Status                     ---------                               -----------         ------                     CBC Auto Differential[[id number]]        Abnormal            Final result                 Please view results for these tests on the individual orders.    CBC Auto Differential [[id number]]  (Abnormal) Collected:  02/02/19 0635    Specimen:  Blood Updated:  02/02/19 0646     WBC 10.96 10*3/mm3      RBC 4.22 10*6/mm3      Hemoglobin 11.8 g/dL      Hematocrit 38.4 %      MCV 91.0 fL      MCH 28.0 pg      MCHC 30.7 g/dL      RDW 16.5 %      RDW-SD 54.2 fl      MPV 10.3 fL      Platelets 195 10*3/mm3      Neutrophil % 82.2 %      Lymphocyte % 10.4 %      Monocyte % 6.3 %      Eosinophil % 0.2 %      Basophil % 0.2 %      Immature Grans % 0.7 %      Neutrophils, Absolute 9.01 10*3/mm3      Lymphocytes, Absolute 1.14 10*3/mm3      Monocytes, Absolute 0.69 10*3/mm3      Eosinophils, Absolute 0.02 10*3/mm3      Basophils, Absolute 0.02 10*3/mm3      Immature Grans, Absolute 0.08 10*3/mm3      nRBC 0.0 /100 WBC     TSH [[id number]]  (Abnormal) Collected:  02/01/19 1824    Specimen:  Blood Updated:  02/01/19 2028     TSH 6.630 mIU/mL     Hemoglobin A1c [[id number]] Collected:  02/01/19 1824    Specimen:  Blood Updated:  02/01/19 1952    Basic Metabolic Panel [[id number]]  (Abnormal) Collected:  02/01/19 1824    Specimen:  Blood Updated:   02/01/19 1924     Glucose 92 mg/dL      BUN 38 mg/dL      Creatinine 0.90 mg/dL      Sodium 137 mmol/L      Potassium 3.7 mmol/L      Chloride 98 mmol/L      CO2 30.0 mmol/L      Calcium 8.9 mg/dL      eGFR Non African Amer 59 mL/min/1.73      BUN/Creatinine Ratio 42.2     Anion Gap 12.7 mmol/L     Narrative:       The MDRD GFR formula is only valid for adults with stable renal function between ages 18 and 70.    CBC & Differential [[id number]] Collected:  02/01/19 1824    Specimen:  Blood Updated:  02/01/19 1846    Narrative:       The following orders were created for panel order CBC & Differential.  Procedure                               Abnormality         Status                     ---------                               -----------         ------                     CBC Auto Differential[[id number]]        Abnormal            Final result                 Please view results for these tests on the individual orders.    CBC Auto Differential [[id number]]  (Abnormal) Collected:  02/01/19 1824    Specimen:  Blood Updated:  02/01/19 1846     WBC 9.04 10*3/mm3      RBC 4.07 10*6/mm3      Hemoglobin 11.5 g/dL      Hematocrit 37.2 %      MCV 91.4 fL      MCH 28.3 pg      MCHC 30.9 g/dL      RDW 15.9 %      RDW-SD 53.1 fl      MPV 10.3 fL      Platelets 217 10*3/mm3      Neutrophil % 77.1 %      Lymphocyte % 12.3 %      Monocyte % 9.3 %      Eosinophil % 0.3 %      Basophil % 0.2 %      Immature Grans % 0.8 %      Neutrophils, Absolute 6.97 10*3/mm3      Lymphocytes, Absolute 1.11 10*3/mm3      Monocytes, Absolute 0.84 10*3/mm3      Eosinophils, Absolute 0.03 10*3/mm3      Basophils, Absolute 0.02 10*3/mm3      Immature Grans, Absolute 0.07 10*3/mm3      nRBC 0.0 /100 WBC     Troponin [[id number]]  (Abnormal) Collected:  02/01/19 1032    Specimen:  Blood Updated:  02/01/19 1123     Troponin I 0.746 ng/mL     Narrative:       Normal Patient Upper Reference Limit (URL) (99th Percentile)=0.03 ng/mL   Non-AMI Illness  Reference Limit=0.03-0.11 ng/mL   AMI Confirmation=0.12 ng/mL and above    BNP [[id number]]  (Abnormal) Collected:  02/01/19 1032    Specimen:  Blood Updated:  02/01/19 1103     proBNP 18,200.0 pg/mL     Comprehensive Metabolic Panel [[id number]]  (Abnormal) Collected:  02/01/19 1032    Specimen:  Blood Updated:  02/01/19 1101     Glucose 109 mg/dL      BUN 40 mg/dL      Creatinine 1.00 mg/dL      Sodium 138 mmol/L      Potassium 4.5 mmol/L      Chloride 99 mmol/L      CO2 31.0 mmol/L      Calcium 9.4 mg/dL      Total Protein 7.0 g/dL      Albumin 4.00 g/dL      ALT (SGPT) 59 U/L      AST (SGOT) 86 U/L      Alkaline Phosphatase 367 U/L      Total Bilirubin 0.4 mg/dL      eGFR Non African Amer 52 mL/min/1.73      Globulin 3.0 gm/dL      A/G Ratio 1.3 g/dL      BUN/Creatinine Ratio 40.0     Anion Gap 12.5 mmol/L     Narrative:       The MDRD GFR formula is only valid for adults with stable renal function between ages 18 and 70.    D-dimer, Quantitative [[id number]]  (Abnormal) Collected:  02/01/19 0948    Specimen:  Blood Updated:  02/01/19 1033     D-Dimer, Quantitative 1,996 ng/mL (FEU)     CBC & Differential [[id number]] Collected:  02/01/19 0948    Specimen:  Blood Updated:  02/01/19 0958    Narrative:       The following orders were created for panel order CBC & Differential.  Procedure                               Abnormality         Status                     ---------                               -----------         ------                     CBC Auto Differential[[id number]]        Abnormal            Final result                 Please view results for these tests on the individual orders.    CBC Auto Differential [[id number]]  (Abnormal) Collected:  02/01/19 0948    Specimen:  Blood Updated:  02/01/19 0958     WBC 10.18 10*3/mm3      RBC 3.95 10*6/mm3      Hemoglobin 11.4 g/dL      Hematocrit 36.0 %      MCV 91.1 fL      MCH 28.9 pg      MCHC 31.7 g/dL      RDW 15.9 %      RDW-SD 52.6 fl      MPV 10.4 fL       Platelets 258 10*3/mm3      Neutrophil % 77.3 %      Lymphocyte % 14.5 %      Monocyte % 7.2 %      Eosinophil % 0.1 %      Basophil % 0.2 %      Immature Grans % 0.7 %      Neutrophils, Absolute 7.87 10*3/mm3      Lymphocytes, Absolute 1.48 10*3/mm3      Monocytes, Absolute 0.73 10*3/mm3      Eosinophils, Absolute 0.01 10*3/mm3      Basophils, Absolute 0.02 10*3/mm3      Immature Grans, Absolute 0.07 10*3/mm3      nRBC 0.0 /100 WBC         I have reviewed the patient's laboratory results.    Radiology results:    Imaging Results (last 24 hours)     Procedure Component Value Units Date/Time    NM Lung Ventilation Perfusion [[id number]] Collected:  02/01/19 1512     Updated:  02/01/19 1515    Narrative:       PROCEDURE: NM LUNG VENTILATION PERFUSION-     HISTORY: rule out PE; I50.9-Heart failure, unspecified; I21.4-Non-ST  elevation (NSTEMI) myocardial infarction; I48.91-Unspecified atrial  fibrillation     PROCEDURE: The patient was injected with 5.2 mCi of Tc 99m MAA. The  patient was unable to do the ventilation portion of the exam. Images  over the lungs were obtained in multiple projections.     COMPARISON: Chest x-ray dated Today.     FINDINGS: There is homogeneous perfusion of both lungs.           Impression:       Low probability for pulmonary embolus.     This report was finalized on 2/1/2019 3:13 PM by Lori Daugherty M.D..    XR Chest 2 View [[id number]] Collected:  02/01/19 1008     Updated:  02/01/19 1013    Narrative:       PROCEDURE: XR CHEST 2 VW-        HISTORY: dyspnea     COMPARISON: July 15, 2018.     FINDINGS: The heart is enlarged. The mediastinum is unremarkable. There  is interstitial pulmonary edema with right greater than left pleural  effusions as well as elevation of the right hemidiaphragm. There is no  pneumothorax. There are no acute osseous abnormalities.           Impression:       Interstitial pulmonary edema with bilateral pleural  effusions.                 This report was  finalized on 2/1/2019 10:09 AM by Lori Daugherty M.D..          I have reviewed the patient's radiology reports.    Medication Review:     I have reviewed the patients active and prn medications.       Assessment & Plan:    Acute congestive heart failure (CMS/HCC) she has diuresed well with Lasix given in the emergency room. Now appears to be mildly dehydrated. Patient continued to improve with better rate control    Atrial fibrillation with RVR (CMS/[facility]) has been evaluated by cardiology. On amiodarone for rate control. May not be a good candidate for long-term anticoagulant therapy due to her risk of frequent falls. We will discuss this again with family and patient before discontinuing it    NSTEMI (non-ST elevated myocardial infarction) (CMS/[facility]) continue with medical management does not want aggressive interventional measures continue with aspirin beta blockers and statins.    Anxiety disorder alprazolam when necessary only. Temazepam when necessary for sleep continue Mr. temazepam.  Discussed plan with patient. Will DC continuous O2 sat monitoring    Speedy Bravo MD  02/02/19  9:14 AM

## 2019-02-02 NOTE — ED PROVIDER NOTES
Subjective   87-year-old female presents to the ED with chief complaint of shortness of breath.  The patient indicates she has been short of breath for the last few days.  She lives at home alone but her son is her neighbor and checks on her frequently.  She indicates that her shortness of breath has been worsening and that she is unable to perform her activities of daily living.  She admits to a dry cough.  No fever.  Does complain of fatigue.  No chest pain.  Complains of lower extremity edema.  No prior treatments alleviating factors.  No other complaints at this time.            Review of Systems   Constitutional: Positive for fatigue. Negative for fever.   Respiratory: Positive for cough and shortness of breath.    Cardiovascular: Positive for palpitations and leg swelling. Negative for chest pain.   All other systems reviewed and are negative.      Past Medical History:   Diagnosis Date   • Chronic lymphocytic leukemia (CMS/HCC)    • CLL (chronic lymphocytic leukemia) (CMS/HCC)    • DVT (deep venous thrombosis) (CMS/HCC)    • Inguinal hernia    • Kidney infection        Allergies   Allergen Reactions   • Codeine Nausea And Vomiting   • Contrast Dye Hives   • Aspirin Palpitations       Past Surgical History:   Procedure Laterality Date   • CARDIOVERSION     •  SECTION     • CYSTOSCOPY BLADDER BIOPSY N/A 2017    Procedure: CYSTOSCOPY BLADDER BIOPSY;  Surgeon: Obed Avalos MD;  Location: [facility] OR;  Service:    • CYSTOSCOPY CYSTOGRAM N/A 2017    Procedure: CYSTOSCOPY CYSTOGRAM, EVACUATION OF BLADDER CLOTS;  Surgeon: Obed Avalos MD;  Location: [facility] OR;  Service:    • HYSTERECTOMY     • OTHER SURGICAL HISTORY      Both Arm Plates   • VARICOSE VEIN SURGERY         Family History   Problem Relation Age of Onset   • Arthritis Mother    • Heart disease Mother    • Hypertension Mother    • Kidney disease Mother    • Heart disease Father    • Hypertension Father    • Arthritis Sister    •  COPD Sister    • Heart disease Sister        Social History     Socioeconomic History   • Marital status:      Spouse name: Not on file   • Number of children: Not on file   • Years of education: Not on file   • Highest education level: Not on file   Tobacco Use   • Smoking status: Never Smoker   • Smokeless tobacco: Never Used   Substance and Sexual Activity   • Alcohol use: No   • Drug use: No   • Sexual activity: Defer           Objective   Physical Exam   Constitutional: She is oriented to person, place, and time. No distress.   Chronically ill-appearing.  Cachectic.   HENT:   Head: Normocephalic and atraumatic.   Nose: Nose normal.   Eyes: Conjunctivae and EOM are normal.   Cardiovascular:   Irregular rhythm.  Tachycardic.   Pulmonary/Chest: Breath sounds normal. Tachypnea noted. No respiratory distress.   Course breath sounds bilaterally.  Accessory muscle use   Abdominal: Soft. She exhibits no distension. There is no tenderness. There is no guarding.   Musculoskeletal: She exhibits edema. She exhibits no deformity.   Edema bilateral lower extremities   Neurological: She is alert and oriented to person, place, and time. No cranial nerve deficit.   Skin: She is not diaphoretic.   Nursing note and vitals reviewed.      Critical Care  Performed by: Virgilio Norris DO  Authorized by: Speedy Bravo MD     Critical care provider statement:     Critical care time (minutes):  35    Critical care was necessary to treat or prevent imminent or life-threatening deterioration of the following conditions:  Cardiac failure, respiratory failure, sepsis, shock and circulatory failure    Critical care was time spent personally by me on the following activities:  Ordering and performing treatments and interventions, development of treatment plan with patient or surrogate, discussions with consultants, evaluation of patient's response to treatment, examination of patient, ordering and review of laboratory studies,  ordering and review of radiographic studies, pulse oximetry, re-evaluation of patient's condition and review of old charts               ED Course  ED Course as of Feb 02 1455 Fri Feb 01, 2019   0952 EKG interpreted by me.  Atrial fibrillation with RVR.  Rate of 129.  Nonspecific ST segment changes some depressions in lateral leads.  Nonspecific T-wave abnormalities.  Abnormal EKG.  [CG]      ED Course User Index  [CG] Virgilio Norris,           Patient presents to the ED in A. fib with RVR.  Started on Cardizem drip.  Chest x-ray shows diffuse pulmonary edema started on Lasix.  Patient's heart rate did improve on Cardizem.  Patient had an STEMI but does not wish for aggressive measures.  Discussed with cardiology.  Patient is started on Lovenox.  Patient will be admitted for further evaluation and medical management.        MDM      Final diagnoses:   Acute congestive heart failure, unspecified heart failure type (CMS/HCC)   NSTEMI (non-ST elevated myocardial infarction) (CMS/HCC)   Atrial fibrillation with RVR (CMS/HCC)            Virgilio Norris DO  02/02/19 1451       Virgilio Norris DO  02/02/19 145

## 2019-02-02 NOTE — PROGRESS NOTES
Continued Stay Note  [facility]     Patient Name: Jose M Green  MRN: [id number]  Today's Date: 2/2/2019    Admit Date: 2/1/2019    Discharge Plan     Row Name 02/02/19 1303       Plan    Plan  Spoke with pt about discharge plans  Confirmed current address and phone numbers Pt lives with daughter and son in law   Son in law is pt's NASIM Francois [phone]  PCP Speedy Bravo    DME cane, rollator,BSC, and bath bench  [facility]   Will continue to assess pt for any further needs prior to discharge         Discharge Codes    No documentation.       Expected Discharge Date and Time     Expected Discharge Date Expected Discharge Time    Feb 5, 2019             Corinne Case RN

## 2019-02-02 NOTE — PLAN OF CARE
Problem: Patient Care Overview  Goal: Plan of Care Review  Outcome: Ongoing (interventions implemented as appropriate)   02/02/19 0259   Coping/Psychosocial   Plan of Care Reviewed With patient   Plan of Care Review   Progress improving   OTHER   Outcome Summary rested well, VSS, tolerating amiodarone drip, Heart rate improved     Goal: Individualization and Mutuality  Outcome: Ongoing (interventions implemented as appropriate)    Goal: Discharge Needs Assessment  Outcome: Ongoing (interventions implemented as appropriate)      Problem: Fall Risk (Adult)  Goal: Identify Related Risk Factors and Signs and Symptoms  Outcome: Outcome(s) achieved Date Met: 02/02/19    Goal: Absence of Fall  Outcome: Ongoing (interventions implemented as appropriate)      Problem: Cardiac: Heart Failure (Adult)  Goal: Signs and Symptoms of Listed Potential Problems Will be Absent, Minimized or Managed (Cardiac: Heart Failure)  Outcome: Outcome(s) achieved Date Met: 02/02/19      Problem: Skin Injury Risk (Adult)  Goal: Identify Related Risk Factors and Signs and Symptoms  Outcome: Outcome(s) achieved Date Met: 02/02/19    Goal: Skin Health and Integrity  Outcome: Ongoing (interventions implemented as appropriate)

## 2019-02-02 NOTE — PLAN OF CARE
Problem: Patient Care Overview  Goal: Plan of Care Review  Outcome: Ongoing (interventions implemented as appropriate)   02/02/19 1618   Coping/Psychosocial   Plan of Care Reviewed With patient      02/02/19 1618   Coping/Psychosocial   Plan of Care Reviewed With patient   Plan of Care Review   Progress improving       Problem: Fall Risk (Adult)  Goal: Absence of Fall  Outcome: Outcome(s) achieved Date Met: 02/02/19 02/02/19 1618   Fall Risk (Adult)   Absence of Fall achieves outcome       Problem: Hospitalized Acutely Ill Older (Adult)  Goal: Signs and Symptoms of Listed Potential Problems Will be Absent, Minimized or Managed (Hospitalized Acutely Ill Older)  Outcome: Ongoing (interventions implemented as appropriate)   02/02/19 1618   Goal/Outcome Evaluation   Problems Assessed (Acutely Ill Older Adult) all   Problems Present (Acute Older Adult) fluid imbalance       Problem: Skin Injury Risk (Adult)  Goal: Skin Health and Integrity  Outcome: Ongoing (interventions implemented as appropriate)   02/02/19 1618   Skin Injury Risk (Adult)   Skin Health and Integrity making progress toward outcome

## 2019-02-02 NOTE — CONSULTS
Referring Provider: Dr pompa / Jeremy   Reason for Consultation:afib with rapid vent rate     Patient Care Team:  Speedy Pompa MD as PCP - General  Speedy Pompa MD as PCP - Family Medicine  Speedy Pompa MD as PCP - Claims Attributed  Speedy Pompa MD        History of present illness:    Elderly lady who is been very short of breath on minimal amount of activity.  The shortness of breath has occurred even at rest for the last 2-3 days time.  She thinks her heart is running away at home health also told her that her heart has been running away on her.  Was brought into the emergency room where she was noted to be    Fibrillation with rapid ventricular rate.  Subsequently patient has been admitted.  Is No History of Chest Pains.  Has Had History of Atrial Fibrillation in the past.  Has Been on Beta Blocker Therapy.    Review of Systems   Pertinent items are noted in HPI  Review of Systems      History    Baseline EKG: Sinus rhythm MN interval 180 ms rate of 73 beats a minute nonspecific ST wave changes.      LV function assessment EF 65% echocardiogram 9/17.    CAD: No prior workup as per patient wishes.    Severe left atrial enlargement.    Pulmonary hypertension-P systolic of 77 mm echocardiogram 9/17.    Leukemia long-standing.    Anemia.    Hypertension.    Osteoporosis.    Hypothyroidism.    Thoracic spine fracture.    Anxiety disorder.    Chronic venous insufficiency with leg edema.    Past surgical history   cataract surgery   breast biopsies   bone marrow    hysterectomy.    Personal history:    Nonsmoker does not drink alcohol function status the patient is very limited.    Family history:    Noncontributory.    Review of symptoms:    Has had a cough has had shortness of breath there is no nausea vomiting diarrhea and abdominal pain loss of consciousness PND orthopnea stroke weakness joint swelling respiratory symptoms are negative.  Past Surgical History:   Procedure Laterality Date   •  CARDIOVERSION     •  SECTION     • CYSTOSCOPY BLADDER BIOPSY N/A 2017    Procedure: CYSTOSCOPY BLADDER BIOPSY;  Surgeon: Obed Avalos MD;  Location: [facility] OR;  Service:    • CYSTOSCOPY CYSTOGRAM N/A 2017    Procedure: CYSTOSCOPY CYSTOGRAM, EVACUATION OF BLADDER CLOTS;  Surgeon: Obed Avalos MD;  Location: [facility] OR;  Service:    • HYSTERECTOMY     • OTHER SURGICAL HISTORY      Both Arm Plates   • VARICOSE VEIN SURGERY     , Family History   Problem Relation Age of Onset   • Arthritis Mother    • Heart disease Mother    • Hypertension Mother    • Kidney disease Mother    • Heart disease Father    • Hypertension Father    • Arthritis Sister    • COPD Sister    • Heart disease Sister    , Social History     Tobacco Use   • Smoking status: Never Smoker   • Smokeless tobacco: Never Used   Substance Use Topics   • Alcohol use: No   • Drug use: No   , Medications Prior to Admission   Medication Sig Dispense Refill Last Dose   • aspirin 81 MG EC tablet Take 1 tablet by mouth Daily.   2019 at Unknown time   • atorvastatin (LIPITOR) 40 MG tablet Take 1 tablet by mouth Daily. 90 tablet 3 2019 at 1800   • docusate sodium (DOK) 100 MG capsule Take 1 capsule by mouth 2 (Two) Times a Day As Needed for Constipation. 90 capsule 3 2019 at 0900   • levothyroxine (SYNTHROID, LEVOTHROID) 100 MCG tablet Take 1 tablet by mouth Daily.  90 tablet 2 2019 at 0900   • metoprolol tartrate (LOPRESSOR) 50 MG tablet Take 1 tablet by mouth 2 (Two) Times a Day. 180 tablet 3 2019 at 0900   • Mirabegron ER (MYRBETRIQ) 25 MG tablet sustained-release 24 hour 24 hr tablet Take 1 tablet by mouth Daily. 30 tablet 11 2019 at 0900   • mirtazapine (REMERON) 30 MG tablet TAKE 1 TABLET BY MOUTH IN THE EVENING  30 tablet 3 2019 at 2000   • omeprazole (priLOSEC) 20 MG capsule Take 1 capsule by mouth Daily. 30 capsule 11 2019 at 0900   , Scheduled Meds:    [START ON 2019] aspirin 81 mg  "Oral Daily   atorvastatin 40 mg Oral Nightly   enoxaparin 30 mg Subcutaneous Q24H   [START ON 2/2/2019] levothyroxine 100 mcg Oral Q AM   metoprolol tartrate 50 mg Oral BID   mirtazapine 30 mg Oral Nightly   [START ON 2/2/2019] oxybutynin XL 5 mg Oral Daily   [START ON 2/2/2019] pantoprazole 40 mg Oral QAM AC   sodium chloride 3 mL Intravenous Q12H   , Continuous Infusions:    diltiaZEM 5-15 mg/hr Last Rate: 10 mg/hr (02/01/19 1209)   , PRN Meds:  •  acetaminophen  •  ALPRAZolam  •  docusate sodium  •  magnesium hydroxide  •  sodium chloride  •  temazepam, Allergies:  Codeine; Contrast dye; and Aspirin     Objective     Vital Sign Min/Max for last 24 hours  Temp  Min: 98.7 °F (37.1 °C)  Max: 98.7 °F (37.1 °C)   BP  Min: 122/108  Max: 185/123   Pulse  Min: 96  Max: 149   Resp  Min: 20  Max: 20   SpO2  Min: 82 %  Max: 100 %   No Data Recorded   Weight  Min: 53.1 kg (117 lb)  Max: 53.1 kg (117 lb)     Flowsheet Rows      First Filed Value   Admission Height  165.1 cm (65\") Documented at 02/01/2019 0942   Admission Weight  53.1 kg (117 lb) Documented at 02/01/2019 0942               Physical Exam:     General Appearance:    Alert, cooperative, in no acute distress   Head:    Normocephalic, without obvious abnormality, atraumatic   Eyes:            Lids and lashes normal, conjunctivae and sclerae normal, no   icterus, no pallor, corneas clear, PERRLA   Ears:    Ears appear intact with no abnormalities noted   Throat:   No oral lesions, no thrush, oral mucosa moist   Neck:   No adenopathy, supple, trachea midline, no thyromegaly, no     carotid bruit, no JVD   Back:     No kyphosis present, no scoliosis present, no skin lesions,       erythema or scars, no tenderness to percussion or                   palpation,   range of motion normal   Lungs:     Clear to auscultation,respirations regular, even and                   unlabored    Heart:    Regular rhythm and normal rate, normal S1 and S2, no            murmur, no " gallop, no rub, no click   Breast Exam:    Deferred   Abdomen:     Normal bowel sounds, no masses, no organomegaly, soft        non-tender, non-distended, no guarding, no rebound                 tenderness   Genitalia:    Deferred   Extremities:   Moves all extremities well, no edema, no cyanosis, no              redness   Pulses:   Pulses palpable and equal bilaterally   Skin:   No bleeding, bruising or rash   Lymph nodes:   No palpable adenopathy   Neurologic:   Cranial nerves 2 - 12 grossly intact, sensation intact, DTR        present and equal bilaterally       Results Review:   I reviewed the patient's new clinical results.    EKG: Atrial fibrillation with rapid ventricular rate diffuse nonspecific ST wave changes.    Bedside echo EF of 60-65% mild to moderate LVH.  Severe RV dilatation moderate to severe tricuspid insufficiency.  Systolic of around 75 mmHg.  A reduced RV systolic function.  Calcified ascending aorta with moderate plaque along the same.  LAB DATA :           WBC   Date Value Ref Range Status   02/01/2019 9.04 4.80 - 10.80 10*3/mm3 Final     RBC   Date Value Ref Range Status   02/01/2019 4.07 (L) 4.20 - 5.40 10*6/mm3 Final     Hemoglobin   Date Value Ref Range Status   02/01/2019 11.5 (L) 12.0 - 16.0 g/dL Final     Hematocrit   Date Value Ref Range Status   02/01/2019 37.2 37.0 - 47.0 % Final     MCV   Date Value Ref Range Status   02/01/2019 91.4 81.0 - 99.0 fL Final     MCH   Date Value Ref Range Status   02/01/2019 28.3 27.0 - 31.0 pg Final     MCHC   Date Value Ref Range Status   02/01/2019 30.9 30.0 - 37.0 g/dL Final     RDW   Date Value Ref Range Status   02/01/2019 15.9 (H) 11.5 - 14.5 % Final     RDW-SD   Date Value Ref Range Status   02/01/2019 53.1 37.0 - 54.0 fl Final     MPV   Date Value Ref Range Status   02/01/2019 10.3 6.0 - 12.0 fL Final     Platelets   Date Value Ref Range Status   02/01/2019 217 130 - 400 10*3/mm3 Final     Neutrophil %   Date Value Ref Range Status    02/01/2019 77.1 37.0 - 80.0 % Final     Lymphocyte %   Date Value Ref Range Status   02/01/2019 12.3 10.0 - 50.0 % Final     Monocyte %   Date Value Ref Range Status   02/01/2019 9.3 0.0 - 12.0 % Final     Eosinophil %   Date Value Ref Range Status   02/01/2019 0.3 0.0 - 7.0 % Final     Basophil %   Date Value Ref Range Status   02/01/2019 0.2 0.0 - 2.5 % Final     Immature Grans %   Date Value Ref Range Status   02/01/2019 0.8 (H) 0.0 - 0.6 % Final     Neutrophils, Absolute   Date Value Ref Range Status   02/01/2019 6.97 (H) 2.00 - 6.90 10*3/mm3 Final     Lymphocytes, Absolute   Date Value Ref Range Status   02/01/2019 1.11 0.60 - 3.40 10*3/mm3 Final     Monocytes, Absolute   Date Value Ref Range Status   02/01/2019 0.84 0.00 - 0.90 10*3/mm3 Final     Eosinophils, Absolute   Date Value Ref Range Status   02/01/2019 0.03 0.00 - 0.70 10*3/mm3 Final     Basophils, Absolute   Date Value Ref Range Status   02/01/2019 0.02 0.00 - 0.20 10*3/mm3 Final     Immature Grans, Absolute   Date Value Ref Range Status   02/01/2019 0.07 (H) 0.00 - 0.06 10*3/mm3 Final     nRBC   Date Value Ref Range Status   02/01/2019 0.0 0.0 - 0.0 /100 WBC Final       Lab Results   Component Value Date    GLUCOSE 92 02/01/2019    BUN 38 (H) 02/01/2019    CREATININE 0.90 02/01/2019    EGFRIFNONA 59 (L) 02/01/2019    EGFRIFAFRI 72 04/20/2018    BCR 42.2 (H) 02/01/2019    CO2 30.0 02/01/2019    CALCIUM 8.9 02/01/2019    PROTENTOTREF 6.7 04/20/2018    ALBUMIN 4.00 02/01/2019    LABIL2 1.5 04/20/2018    AST 86 (H) 02/01/2019    ALT 59 02/01/2019       Lab Results   Component Value Date    CKTOTAL 272 (H) 09/05/2016    CKMB 0.8 09/05/2016    CKMBINDEX 0 09/05/2016    TROPONINI 0.746 (C) 02/01/2019       No results found for: DDIMER    No results found for: SITE, ALLENTEST, PHART, YGM0JND, PO2ART, RUS8AQG, BASEEXCESS, F1NHPBPQ, HGBBG, HCTABG, OXYHEMOGLOBI, METHHGBN, CARBOXYHGB, CO2CT, BAROMETRIC, MODALITY, FIO2  No results found for: HGBA1C      No  results found for: LIPASE    IMAGING DATA:     Xr Chest 2 View    Result Date: 2/1/2019  Narrative: PROCEDURE: XR CHEST 2 VW-    HISTORY: dyspnea  COMPARISON: July 15, 2018.  FINDINGS: The heart is enlarged. The mediastinum is unremarkable. There is interstitial pulmonary edema with right greater than left pleural effusions as well as elevation of the right hemidiaphragm. There is no pneumothorax. There are no acute osseous abnormalities.         Impression: Interstitial pulmonary edema with bilateral pleural effusions.      This report was finalized on 2/1/2019 10:09 AM by Lori Daugherty M.D..    Nm Lung Ventilation Perfusion    Result Date: 2/1/2019  Narrative: PROCEDURE: NM LUNG VENTILATION PERFUSION-  HISTORY: rule out PE; I50.9-Heart failure, unspecified; I21.4-Non-ST elevation (NSTEMI) myocardial infarction; I48.91-Unspecified atrial fibrillation  PROCEDURE: The patient was injected with 5.2 mCi of Tc 99m MAA. The patient was unable to do the ventilation portion of the exam. Images over the lungs were obtained in multiple projections.  COMPARISON: Chest x-ray dated Today.  FINDINGS: There is homogeneous perfusion of both lungs.         Impression: Low probability for pulmonary embolus.  This report was finalized on 2/1/2019 3:13 PM by Lori Daugherty M.D..        DIAGNOSIS   #1 atrial fibrillation:  Patient is presented with atrial fibrillation with rapid ventricular rate.  She's been very symptomatic with the same.  Bedside echocardiogram reveals normal LV systolic function with the peak left ventricle.  Most of her presenting symptoms appears to be more of diastolic dysfunction secondary to rate issues.  Given her age and her overall prognosis would prefer that she be loaded on amiodarone therapy due to the size of the circumflex atria as well as the left ventricular thickness.  We'll discontinue the Cardizem put her on amiodarone drip overnight.    #2 anticoagulation therapy:  Will need to be on the  same will start low-dose anti-graduation therapy at this time.    #3 coronary artery disease:  Enzymes are mildly elevated.  This appears to be a supply demand mismatch issue.  No further workup planned on this front.    #4 cor pulmonale:  Patient has severe tricuspid insufficiency with severe pulmonary hypertension and a dilated RV with reduced RV systolic function.  This appears to be long-standing supportive care for the same is being planned.    #5 CODE STATUS:  Acknowledge that she is DNR and no aggressive measures will be pursued.          Acute congestive heart failure (CMS/HCC)    Atrial fibrillation with RVR (CMS/[facility])    NSTEMI (non-ST elevated myocardial infarction) (CMS/[facility])        I discussed the patients findings and my recommendations with patient    Jeff Ramirez MD  02/01/19  7:38 PM      Please note that portions of this note may have been completed with a voice recognition program. Efforts were made to edit the dictations, but occasionally words are mistranscribed.

## 2019-02-03 NOTE — PROGRESS NOTES
[facility]    Name:  Jose M Green   Age:  87 y.o.  Sex:  female  :  1931  MRN:  [id number]   Visit Number:  [id number]  Admission Date:  2019  Date Of Service:  19  Primary Care Physician:  Speedy Bravo MD     LOS: 2 days :  Patient Care Team:  Speedy Bravo MD as PCP - General  Speedy Bravo MD as PCP - Family Medicine  Speedy Bravo MD as PCP - Claims Attributed  Speedy Bravo MD:    Chief Complaint:      Left flank and back pain.    Subjective / Interval History:     Ms. Green is currently sitting up on the bed and is complaining of pain in the left side of her back.  She was admitted on 2019 with atrial fibrillation and rapid ventricular rate.  She was seen by Dr. Ramirez and was placed on amiodarone drip and is currently on oral amiodarone with controlled heart rate.  She denies any chest pain or shortness of breath at this time.  Previous physician documentation, laboratory and imaging data been reviewed.    Review of Systems:     General ROS: Patient denies any fevers, chills or loss of consciousness.  Respiratory ROS: Denies cough or shortness of breath.  Cardiovascular ROS: Denies chest pain or palpitations. No history of exertional chest pain.  Gastrointestinal ROS: Denies nausea and vomiting. Denies any abdominal pain. No diarrhea.  Neurological ROS: Denies any focal weakness. No loss of consciousness. Denies any numbness.  Dermatological ROS: Denies any redness or pruritis.    Vital Signs:    Temp:  [97.3 °F (36.3 °C)-97.9 °F (36.6 °C)] 97.4 °F (36.3 °C)  Heart Rate:  [] 128  Resp:  [16-17] 16  BP: (129-150)/(68-95) 129/91    Intake and output:    I/O last 3 completed shifts:  In: 1680 [P.O.:1380; I.V.:300]  Out: 400 [Urine:400]  I/O this shift:  In: 1480 [P.O.:480; I.V.:1000]  Out: -     Physical Examination:    General Appearance:  Alert and cooperative, not in any acute distress.   Head:  Atraumatic and  normocephalic, without obvious abnormality.   Eyes:          PERRLA, conjunctivae and sclerae normal, no Icterus. No pallor. Extra-occular movements are within normal limits.   Neck: Supple, trachea midline, no thyromegaly, no carotid bruit.   Lungs:   Kyphoscoliosis is present.  No ecchymosis noted on the back.  She does have tenderness in the left great and left lower back.  Breath sounds heard bilaterally equally.  No crackles or wheezing. No Pleural rub or bronchial breathing.   Heart:  Irregular S1 and S2, 2/6 systolic murmur, no gallop, no rub. No JVD   Abdomen:   Normal bowel sounds, no masses, no organomegaly. Soft, non-tender, non-distended, no guarding, no rebound tenderness.   Extremities: Moves all extremities well, 1+ edema, no cyanosis, no clubbing.   Skin: No bleeding, bruising or rash.   Neurologic: Awake, alert and oriented times 3. Moves all 4 extremities equally.     Laboratory results:    Results from last 7 days   Lab Units 02/03/19  1350 02/03/19  0650 02/02/19  0635 02/01/19  1824 02/01/19  1032   SODIUM mmol/L  --  137 137 137 138   POTASSIUM mmol/L 5.3* 6.1* 4.7 3.7 4.5   CHLORIDE mmol/L  --  103 101 98 99   CO2 mmol/L  --  28.0 30.0 30.0 31.0*   BUN mg/dL  --  37* 37* 38* 40*   CREATININE mg/dL  --  1.00 1.10 0.90 1.00   CALCIUM mg/dL  --  9.0 8.7 8.9 9.4   BILIRUBIN mg/dL  --   --   --   --  0.4   ALK PHOS U/L  --   --   --   --  367*   ALT (SGPT) U/L  --   --   --   --  59   AST (SGOT) U/L  --   --   --   --  86*   GLUCOSE mg/dL  --  114* 98 92 109*     Results from last 7 days   Lab Units 02/03/19  0650 02/02/19  0635 02/01/19  1824   WBC 10*3/mm3 9.96 10.96* 9.04   HEMOGLOBIN g/dL 12.0 11.8* 11.5*   HEMATOCRIT % 39.2 38.4 37.2   PLATELETS 10*3/mm3 262 195 217         Results from last 7 days   Lab Units 02/02/19  0635 02/01/19  1032   TROPONIN I ng/mL 0.525* 0.746*           I have reviewed the patient's laboratory results.    Radiology results:    Imaging Results (last 24 hours)     **  No results found for the last 24 hours. **        Medication Review:     I have reviewed the patients active and prn medications.       Acute congestive heart failure (CMS/HCC)    Atrial fibrillation with RVR (CMS/HCC)    NSTEMI (non-ST elevated myocardial infarction) (CMS/[facility])    Assessment:    1.  Atrial fibrillation with rapid ventricular rate, present on admission.  2.  Type II myocardial infarction secondary to #1, present on admission.  3.  Acute hypokalemia, improving.  4.  Severe pulmonary hypertension.  5.  Acquired hypothyroidism.  6.  Essential hypertension.    Plan:    Ms. Green is currently on oral amiodarone and her heart rate is fairly controlled.  She is being followed by Dr. Ramirez.  She is currently on aspirin and apixaban.  She will also be continued on amiodarone, Cardizem, metoprolol and atorvastatin.  He has been given Kayexalate this morning for mild elevation of potassium levels.  We will repeat her basic metabolic panel in the morning.  She is complaining of left back pain.  I will get left-sided rib views to rule out any rib fractures.  She will also be started on physical and occupational therapy.  Further recommendations depend upon her clinical course    Skip Luna MD  02/03/19  6:01 PM    Dictated utilizing Dragon dictation.

## 2019-02-03 NOTE — PROGRESS NOTES
"   LOS: 2 days   Patient Care Team:  Speedy Bravo MD as PCP - General  Speedy Bravo MD as PCP - Family Medicine  Speedy Bravo MD as PCP - Claims Attributed  Speedy Bravo MD      Interval History:   Patient feels okay has strength talk.  Unable to cough up stuff from her lungs.  Is having her breakfast this morning.  No chest pains per se has some rib pain on the left side is wondering why even after the x-ray did not show anything.      Review of Systems:   Pertinent items are noted in HPI.      Objective     Vital Sign Min/Max for last 24 hours  Temp  Min: 97.3 °F (36.3 °C)  Max: 98.2 °F (36.8 °C)   BP  Min: 127/87  Max: 150/95   Pulse  Min: 90  Max: 110   Resp  Min: 16  Max: 18   SpO2  Min: 93 %  Max: 100 %   Flow (L/min)  Min: 2  Max: 3   Weight  Min: 53.2 kg (117 lb 3.2 oz)  Max: 53.2 kg (117 lb 3.2 oz)     Flowsheet Rows      First Filed Value   Admission Height  165.1 cm (65\") Documented at 02/01/2019 0942   Admission Weight  53.1 kg (117 lb) Documented at 02/01/2019 0942          Physical Exam:     General Appearance:    Alert, cooperative, in no acute distress   Head:    Normocephalic, without obvious abnormality, atraumatic   Eyes:            Lids and lashes normal, conjunctivae and sclerae normal, no   icterus, no pallor, corneas clear, PERRLA   Ears:    Ears appear intact with no abnormalities noted   Throat:   No oral lesions, no thrush, oral mucosa moist   Neck:   No adenopathy, supple, trachea midline, no thyromegaly, no     carotid bruit, no JVD   Back:     No kyphosis present, no scoliosis present, no skin lesions,       erythema or scars, no tenderness to percussion or                   palpation,   range of motion normal   Lungs:     Clear to auscultation,respirations regular, even and                   unlabored    Heart:    Regular rhythm and normal rate, normal S1 and S2, no            murmur, no gallop, no rub, no click   Breast Exam:    Deferred   Abdomen:     Normal bowel " sounds, no masses, no organomegaly, soft        non-tender, non-distended, no guarding, no rebound                 tenderness   Genitalia:    Deferred   Extremities:   Moves all extremities well, no edema, no cyanosis, no              redness   Pulses:   Pulses palpable and equal bilaterally   Skin:   No bleeding, bruising or rash   Lymph nodes:   No palpable adenopathy   Neurologic:   Cranial nerves 2 - 12 grossly intact, sensation intact, DTR        present and equal bilaterally        Results Review:     I reviewed the patient's new clinical results.    Results Review:   I reviewed the patient's new clinical results.    Telemetry: Average heart rate of 108.    LAB DATA :           Laboratory results:    Results from last 7 days   Lab Units 02/03/19  0650 02/02/19  0635 02/01/19  1824 02/01/19  1032   SODIUM mmol/L 137 137 137 138   POTASSIUM mmol/L 6.1* 4.7 3.7 4.5   CHLORIDE mmol/L 103 101 98 99   CO2 mmol/L 28.0 30.0 30.0 31.0*   BUN mg/dL 37* 37* 38* 40*   CREATININE mg/dL 1.00 1.10 0.90 1.00   CALCIUM mg/dL 9.0 8.7 8.9 9.4   BILIRUBIN mg/dL  --   --   --  0.4   ALK PHOS U/L  --   --   --  367*   ALT (SGPT) U/L  --   --   --  59   AST (SGOT) U/L  --   --   --  86*   GLUCOSE mg/dL 114* 98 92 109*     Results from last 7 days   Lab Units 02/03/19  0650 02/02/19  0635 02/01/19 1824 02/01/19  0948   WBC 10*3/mm3 9.96 10.96* 9.04 10.18   HEMOGLOBIN g/dL 12.0 11.8* 11.5* 11.4*   HEMATOCRIT % 39.2 38.4 37.2 36.0*   PLATELETS 10*3/mm3 262 195 217 258                 Results from last 7 days   Lab Units 02/02/19  0635   TROPONIN I ng/mL 0.525*                 No results found for: HGBA1C  Results from last 7 days   Lab Units 02/01/19  1824   TSH mIU/mL 6.630*           IMAGING DATA:     Xr Chest 2 View    Result Date: 2/1/2019  Narrative: PROCEDURE: XR CHEST 2 VW-    HISTORY: dyspnea  COMPARISON: July 15, 2018.  FINDINGS: The heart is enlarged. The mediastinum is unremarkable. There is interstitial pulmonary edema  with right greater than left pleural effusions as well as elevation of the right hemidiaphragm. There is no pneumothorax. There are no acute osseous abnormalities.         Impression: Interstitial pulmonary edema with bilateral pleural effusions.      This report was finalized on 2/1/2019 10:09 AM by Lori Daugherty M.D..    Nm Lung Ventilation Perfusion    Result Date: 2/1/2019  Narrative: PROCEDURE: NM LUNG VENTILATION PERFUSION-  HISTORY: rule out PE; I50.9-Heart failure, unspecified; I21.4-Non-ST elevation (NSTEMI) myocardial infarction; I48.91-Unspecified atrial fibrillation  PROCEDURE: The patient was injected with 5.2 mCi of Tc 99m MAA. The patient was unable to do the ventilation portion of the exam. Images over the lungs were obtained in multiple projections.  COMPARISON: Chest x-ray dated Today.  FINDINGS: There is homogeneous perfusion of both lungs.         Impression: Low probability for pulmonary embolus.  This report was finalized on 2/1/2019 3:13 PM by Lori Daugherty M.D..            Assessment/Plan    #1 atrial fibrillation.  Continued rapid ventricular rate noted.  On amiodarone oral therapy after infusion.  Continue beta blocker therapy.  Will add low-dose Cardizem to the drug regimen.  Needs to try to ambulate and see what the heart rate does.    #2 anticoagulation therapy:  By the chads 2 score in its to be on the same.  If indeed recurrent falls noted will have to be weighed the risk and benefit and discontinue the same this will be as per Dr. Bravo.    #3 coronary artery disease.  No further workup being planned medical therapy for now.    #4 cor pulmonale:  Has severe pulmonary hypertension with severe RV dilatation.  Would respond to gentle hydration might have to consider the same if response to Cardizem is suboptimal.        Acute congestive heart failure (CMS/HCC)    Atrial fibrillation with RVR (CMS/HCC)    NSTEMI (non-ST elevated myocardial infarction) (CMS/HCC)            Jeff  Amirah Ramirez MD  02/03/19  7:49 AM

## 2019-02-03 NOTE — PLAN OF CARE
Problem: Hospitalized Acutely Ill Older (Adult)  Goal: Signs and Symptoms of Listed Potential Problems Will be Absent, Minimized or Managed (Hospitalized Acutely Ill Older)  Outcome: Ongoing (interventions implemented as appropriate)   02/02/19 8684   Goal/Outcome Evaluation   Problems Assessed (Acutely Ill Older Adult) all   Problems Present (Acute Older Adult) fluid imbalance

## 2019-02-03 NOTE — PLAN OF CARE
Problem: Patient Care Overview  Goal: Plan of Care Review  Outcome: Ongoing (interventions implemented as appropriate)   02/03/19 7126   Coping/Psychosocial   Plan of Care Reviewed With patient   Plan of Care Review   Progress no change   OTHER   Outcome Summary rested well, VSS, HR continues to be afib ranging 's, continues to be extremely weak and loose NP cough, slightly confused at times, did require NC 2L to keep sats >90 while asleep.      Goal: Individualization and Mutuality  Outcome: Ongoing (interventions implemented as appropriate)    Goal: Discharge Needs Assessment  Outcome: Ongoing (interventions implemented as appropriate)      Problem: Hospitalized Acutely Ill Older (Adult)  Goal: Signs and Symptoms of Listed Potential Problems Will be Absent, Minimized or Managed (Hospitalized Acutely Ill Older)  Outcome: Ongoing (interventions implemented as appropriate)      Problem: Skin Injury Risk (Adult)  Goal: Skin Health and Integrity  Outcome: Ongoing (interventions implemented as appropriate)

## 2019-02-04 NOTE — PLAN OF CARE
Problem: Hospitalized Acutely Ill Older (Adult)  Goal: Signs and Symptoms of Listed Potential Problems Will be Absent, Minimized or Managed (Hospitalized Acutely Ill Older)  Outcome: Ongoing (interventions implemented as appropriate)   02/02/19 6223   Goal/Outcome Evaluation   Problems Assessed (Acutely Ill Older Adult) all   Problems Present (Acute Older Adult) fluid imbalance

## 2019-02-04 NOTE — PLAN OF CARE
Problem: Patient Care Overview  Goal: Plan of Care Review  Outcome: Ongoing (interventions implemented as appropriate)   02/04/19 0322   Coping/Psychosocial   Plan of Care Reviewed With patient   Plan of Care Review   Progress no change   OTHER   Outcome Summary rested fair, HR continue to range 100-130's at times, L rib pain controlled with prn meds     Goal: Individualization and Mutuality  Outcome: Ongoing (interventions implemented as appropriate)    Goal: Discharge Needs Assessment  Outcome: Ongoing (interventions implemented as appropriate)      Problem: Skin Injury Risk (Adult)  Goal: Skin Health and Integrity  Outcome: Ongoing (interventions implemented as appropriate)

## 2019-02-04 NOTE — PLAN OF CARE
Problem: Patient Care Overview  Goal: Plan of Care Review  Outcome: Ongoing (interventions implemented as appropriate)   02/04/19 1230   Coping/Psychosocial   Plan of Care Reviewed With patient   Plan of Care Review   Progress no change   OTHER   Outcome Summary OT eval completed. Patient presents deficits in strength, endurance, balance, mobility and ADL performance. Patient is expected to benefit from continued OT services prior to DC.

## 2019-02-04 NOTE — THERAPY EVALUATION
Acute Care - Occupational Therapy Initial Evaluation  [facility]     Patient Name: Jose M Green  : 1931  MRN: [id number]  Today's Date: 2019  Onset of Illness/Injury or Date of Surgery: 19  Date of Referral to OT: 19  Referring Physician: Dr. Luna    Admit Date: 2019       ICD-10-CM ICD-9-CM   1. Acute congestive heart failure, unspecified heart failure type (CMS/[facility]) I50.9 428.0   2. NSTEMI (non-ST elevated myocardial infarction) (CMS/[facility]) I21.4 410.70   3. Atrial fibrillation with RVR (CMS/[facility]) I48.91 427.31   4. Impaired functional mobility, balance, gait, and endurance Z74.09 V49.89   5. Impaired mobility and ADLs Z74.09 799.89     Patient Active Problem List   Diagnosis   • Chronic lymphocytic leukemia (CMS/[facility])   • Essential hypertension   • Acquired hypothyroidism   • Right knee pain   • Paroxysmal atrial fibrillation (CMS/[facility])   • Coronary artery disease involving native coronary artery of native heart without angina pectoris   • Acute cystitis with hematuria   • Hematuria   • Interstitial cystitis   • Acute congestive heart failure (CMS/[facility])   • Atrial fibrillation with RVR (CMS/[facility])   • NSTEMI (non-ST elevated myocardial infarction) (CMS/[facility])     Past Medical History:   Diagnosis Date   • Chronic lymphocytic leukemia (CMS/HCC)    • CLL (chronic lymphocytic leukemia) (CMS/[facility])    • DVT (deep venous thrombosis) (CMS/[facility])    • Inguinal hernia    • Kidney infection      Past Surgical History:   Procedure Laterality Date   • CARDIOVERSION     •  SECTION     • CYSTOSCOPY BLADDER BIOPSY N/A 2017    Procedure: CYSTOSCOPY BLADDER BIOPSY;  Surgeon: Obed Avalos MD;  Location: [facility] OR;  Service:    • CYSTOSCOPY CYSTOGRAM N/A 2017    Procedure: CYSTOSCOPY CYSTOGRAM, EVACUATION OF BLADDER CLOTS;  Surgeon: Obed Avalos MD;  Location: [facility] OR;  Service:    • HYSTERECTOMY     • OTHER SURGICAL HISTORY      Both Arm Plates   • VARICOSE VEIN SURGERY             OT ASSESSMENT FLOWSHEET (last 72 hours)      Occupational Therapy Evaluation     Row Name 02/04/19 0940                   OT Evaluation Time/Intention    Subjective Information  no complaints  -SD        Document Type  evaluation  -SD        Mode of Treatment  occupational therapy  -SD        Patient Effort  adequate  -SD        Symptoms Noted During/After Treatment  significant change in vital signs;shortness of breath  -SD        Comment  Afib noted  -SD           General Information    Patient Profile Reviewed?  yes  -SD        Onset of Illness/Injury or Date of Surgery  02/01/19  -SD        Referring Physician  Dr. Luna  -SD        Patient Observations  alert;cooperative;agree to therapy  -SD        General Observations of Patient  Reclined in chair, on 2L O2  -SD        Prior Level of Function  independent:;all household mobility  -SD        Equipment Currently Used at Home  walker, rolling;shower chair;wheelchair;cane, straight;commode, bedside  -SD        Pertinent History of Current Functional Problem  Afib with RVR; CAD, Afib, SOB, Acute CHF, NSTEMI  -SD        Existing Precautions/Restrictions  fall;oxygen therapy device and L/min  -SD        Risks Reviewed  patient:;increased discomfort  -SD        Benefits Reviewed  patient:;improve function;increase independence;increase strength;increase balance  -SD           Relationship/Environment    Primary Source of Support/Comfort  child(dave)  -SD        Name(s) of Who Lives With Patient  -- reports dtr and son-in law live in apt adjoining house  -SD           Home Main Entrance    Number of Stairs, Main Entrance  one  -SD           Stairs Within Home, Primary    Stairs, Within Home, Primary  2nd floor; doesn't access  -SD        Number of Stairs, Within Home, Primary  ten  -SD           Cognitive Assessment/Intervention- PT/OT    Orientation Status (Cognition)  oriented x 4;verbal cues/prompts needed for orientation  -SD        Follows Commands (Cognition)   verbal cues/prompting required;repetition of directions required;physical/tactile prompts required  -SD        Safety Deficit (Cognitive)  safety precautions follow-through/compliance;insight into deficits/self awareness;awareness of need for assistance  -SD           Safety Issues, Functional Mobility    Safety Issues Affecting Function (Mobility)  safety precautions follow-through/compliance;insight into deficits/self awareness;awareness of need for assistance  -SD        Impairments Affecting Function (Mobility)  balance;coordination;endurance/activity tolerance;shortness of breath;strength  -SD           Bed Mobility Assessment/Treatment    Comment (Bed Mobility)  Pt in chair  -SD           Functional Mobility    Functional Mobility- Ind. Level  minimum assist (75% patient effort)  -SD        Functional Mobility- Device  rolling walker  -SD        Functional Mobility-Distance (Feet)  5 5  -SD        Functional Mobility- Safety Issues  balance decreased during turns;sequencing ability decreased;step length decreased;weight-shifting ability decreased;supplemental O2  -SD           Transfer Assessment/Treatment    Transfer Assessment/Treatment  sit-stand transfer;stand-sit transfer  -SD           Sit-Stand Transfer    Sit-Stand [place] (Transfers)  minimum assist (75% patient effort)  -SD        Assistive Device (Sit-Stand Transfers)  walker, front-wheeled  -SD           Stand-Sit Transfer    Stand-Sit [place] (Transfers)  minimum assist (75% patient effort)  -SD        Assistive Device (Stand-Sit Transfers)  walker, front-wheeled  -SD           ADL Assessment/Intervention    BADL Assessment/Intervention  bathing;upper body dressing;lower body dressing;grooming;feeding;toileting  -SD           Bathing Assessment/Intervention    Bathing [place] Level  minimum assist (75% patient effort)  -SD           Upper Body Dressing Assessment/Training    Upper Body Dressing [place] Level  contact guard  assist  -SD           Lower Body Dressing Assessment/Training    Lower Body Dressing [place] Level  minimum assist (75% patient effort)  -SD           Grooming Assessment/Training    [place] Level (Grooming)  contact guard assist  -SD           Self-Feeding Assessment/Training    [place] Level (Feeding)  supervision  -SD           Toileting Assessment/Training    [place] Level (Toileting)  minimum assist (75% patient effort)  -SD           BADL Safety/Performance    Impairments, BADL Safety/Performance  balance;endurance/activity tolerance;coordination;shortness of breath;strength  -SD           General ROM    GENERAL ROM COMMENTS  WFL  -SD           MMT (Manual Muscle Testing)    General MMT Comments  3+/5  -SD           Positioning and Restraints    Pre-Treatment Position  sitting in chair/recliner  -SD        Post Treatment Position  chair  -SD        In Chair  reclined;call light within reach;encouraged to call for assist;exit alarm on  -SD           Pain Assessment    Additional Documentation  Pain Scale: Numbers Pre/Post-Treatment (Group)  -SD           Pain Scale: Numbers Pre/Post-Treatment    Pain Scale: Numbers, Pretreatment  0/10 - no pain  -SD        Pain Scale: Numbers, Post-Treatment  0/10 - no pain  -SD           Coping    Observed Emotional State  calm;cooperative  -SD        Verbalized Emotional State  acceptance  -SD           Plan of Care Review    Plan of Care Reviewed With  patient  -SD           Clinical Impression (OT)    Date of Referral to OT  02/03/19  -SD        OT Diagnosis  ADL decline  -SD        Patient/Family Goals Statement (OT Eval)  Increase strength and mobility  -SD        Criteria for Skilled Therapeutic Interventions Met (OT Eval)  yes  -SD        Rehab Potential (OT Eval)  good, to achieve stated therapy goals  -SD        Therapy Frequency (OT Eval)  3 times/wk 5 times if indicated  -SD        Care Plan Review (OT)  evaluation/treatment results reviewed  -SD         Anticipated Discharge Disposition (OT)  inpatient rehabilitation facility  -SD           Vital Signs    Pretreatment Heart Rate (beats/min)  130  -SD        Intratreatment Heart Rate (beats/min)  87  -SD        Posttreatment Heart Rate (beats/min)  103  -SD        O2 Delivery Pre Treatment  supplemental O2  -SD        O2 Delivery Intra Treatment  supplemental O2  -SD        Post SpO2 (%)  97  -SD        O2 Delivery Post Treatment  supplemental O2  -SD        Pre Patient Position  Sitting  -SD        Intra Patient Position  Standing  -SD        Post Patient Position  Sitting  -SD           Planned OT Interventions    Planned Therapy Interventions (OT Eval)  activity tolerance training;adaptive equipment training;BADL retraining;patient/caregiver education/training;strengthening exercise;transfer/mobility retraining  -SD           OT Goals    Transfer Goal Selection (OT)  transfer, OT goal 1  -SD        Dressing Goal Selection (OT)  dressing, OT goal 1  -SD        Toileting Goal Selection (OT)  toileting, OT goal 1  -SD        Strength Goal Selection (OT)  strength, OT goal 1  -SD        Functional Mobility Goal Selection (OT)  functional mobility, OT goal 1  -SD        Additional Documentation  Strength Goal Selection (OT) (Row);Functional Mobility Selection (OT) (Row)  -SD           Transfer Goal 1 (OT)    Activity/Assistive Device (Transfer Goal 1, OT)  sit-to-stand/stand-to-sit;walker, rolling  -SD        [place] Level/Cues Needed (Transfer Goal 1, OT)  contact guard assist  -SD        Time Frame (Transfer Goal 1, OT)  2 weeks  -SD        Progress/Outcome (Transfer Goal 1, OT)  goal ongoing  -SD           Dressing Goal 1 (OT)    Activity/Assistive Device (Dressing Goal 1, OT)  lower body dressing  -SD        [place]/Cues Needed (Dressing Goal 1, OT)  contact guard assist  -SD        Time Frame (Dressing Goal 1, OT)  2 weeks  -SD        Progress/Outcome (Dressing Goal 1, OT)  goal ongoing  -SD            Toileting Goal 1 (OT)    Activity/Device (Toileting Goal 1, OT)  toileting skills, all;commode  -SD        [place] Level/Cues Needed (Toileting Goal 1, OT)  contact guard assist  -SD        Time Frame (Toileting Goal 1, OT)  2 weeks  -SD        Progress/Outcome (Toileting Goal 1, OT)  goal ongoing  -SD           Strength Goal 1 (OT)    Strength Goal 1 (OT)  Pt to perform UB ther ex using theraband  -SD        Time Frame (Strength Goal 1, OT)  long term goal (LTG)  -SD        Progress/Outcome (Strength Goal 1, OT)  goal ongoing  -SD           Functional Mobility Goal 1 (OT)    Activity/Assistive Device (Functional Mobility Goal 1, OT)  walker, rolling  -SD        [place] Level/Cues Needed (Functional Mobility Goal 1, OT)  contact guard assist  -SD        Distance Goal 1 (Functional Mobility, OT)  50  -SD        Time Frame (Functional Mobility Goal 1, OT)  long term goal (LTG)  -SD        Progress/Outcome (Functional Mobility Goal 1, OT)  goal ongoing  -SD           Living Environment    Home Accessibility  stairs to enter home;stairs within home  -SD          User Key  (r) = Recorded By, (t) = Taken By, (c) = Cosigned By    Initials Name Effective Dates    SD Priscilla Cerna OT 03/07/18 -          Occupational Therapy Education     Title: PT OT SLP Therapies (In Progress)     Topic: Occupational Therapy (In Progress)     Point: ADL training (Done)     Description: Instruct learner(s) on proper safety adaptation and remediation techniques during self care or transfers.   Instruct in proper use of assistive devices.    Learning Progress Summary           Patient Acceptance, E,TB, VU by SD at 2/4/2019 12:31 PM    Comment:  Benefit of OT; OT POC                               User Key     Initials Effective Dates Name Provider Type Discipline    SD 03/07/18 -  Priscilla Cerna OT Occupational Therapist OT                  OT Recommendation and Plan  Outcome Summary/Treatment Plan (OT)  Anticipated  Discharge Disposition (OT): inpatient rehabilitation facility  Planned Therapy Interventions (OT Eval): activity tolerance training, adaptive equipment training, BADL retraining, patient/caregiver education/training, strengthening exercise, transfer/mobility retraining  Therapy Frequency (OT Eval): 3 times/wk(5 times if indicated)  Plan of Care Review  Plan of Care Reviewed With: patient  Plan of Care Reviewed With: patient  Outcome Summary: OT eval completed. Patient presents deficits in strength, endurance, balance, mobility and ADL performance. Patient is expected to benefit from continued OT services prior to DC.     Outcome Measures     Row Name 02/04/19 0940             How much help from another is currently needed...    Putting on and taking off regular lower body clothing?  3  -SD      Bathing (including washing, rinsing, and drying)  3  -SD      Toileting (which includes using toilet bed pan or urinal)  3  -SD      Putting on and taking off regular upper body clothing  3  -SD      Taking care of personal grooming (such as brushing teeth)  4  -SD      Eating meals  4  -SD      Score  20  -SD         Functional Assessment    Outcome Measure Options  AM-PAC 6 Clicks Daily Activity (OT)  -SD        User Key  (r) = Recorded By, (t) = Taken By, (c) = Cosigned By    Initials Name Provider Type    Priscilla Mitchell OT Occupational Therapist          Time Calculation:   Time Calculation- OT     Row Name 02/04/19 1231             Time Calculation- OT    OT Start Time  0940  -SD      OT Received On  02/04/19  -SD      OT Goal Re-Cert Due Date  02/14/19  -SD        User Key  (r) = Recorded By, (t) = Taken By, (c) = Cosigned By    Initials Name Provider Type    Priscilla Mitchell OT Occupational Therapist        Therapy Suggested Charges     Code   Minutes Charges    None           Therapy Charges for Today     Code Description Service Date Service Provider Modifiers Qty    30504691322  OT EVAL LOW COMPLEXITY 4  2/4/2019 Priscilla Cerna, OT GO 1               Priscilla Cerna, OT  2/4/2019

## 2019-02-04 NOTE — PROGRESS NOTES
Adult Nutrition  Assessment/PES    Patient Name:  Jose M Green  YOB: 1931  MRN: [id number]  Admit Date:  2/1/2019    Assessment Date:  2/4/2019    Comments:  Rec #1: Continue current diet order; Encouraging intake. Pt receiving 43% PO intake over 7 meals. Rec #2: Consider MVI with minerals daily. Potassium labs elevated. Rec #3: Continue to monitor/replace electrolytes PRN. RD to follow pt. Consult RD PRN.       Reason for Assessment     Row Name 02/04/19 1531          Reason for Assessment    Reason For Assessment  diagnosis/disease state;identified at risk by screening criteria     Diagnosis  cardiac disease;other (see comments) CAD, CHF, NSTEMI, A-fib             Labs/Tests/Procedures/Meds     Row Name 02/04/19 1534          Labs/Procedures/Meds    Lab Results Reviewed  reviewed, pertinent     Lab Results Comments  High: K, Glu, BUN        Medications    Pertinent Medications Reviewed  reviewed           Estimated/Assessed Needs     Row Name 02/04/19 1537          Calculation Measurements    Weight Used For Calculations  51.7 kg (113 lb 15.7 oz)        Estimated/Assessed Needs    Additional Documentation  Fluid Requirements (Group);Gaston-St. Jeor Equation (Group);Protein Requirements (Group);Calorie Requirements (Group)        Calorie Requirements    Weight Used For Calorie Calculations  -- AF 1.2     Estimated Calorie Need Method  Gaston-St Jeor     Estimated Calorie Requirement Comment  2024-8419        KCAL/KG    14 Kcal/Kg (kcal)  723.8     15 Kcal/Kg (kcal)  775.5     18 Kcal/Kg (kcal)  930.6     20 Kcal/Kg (kcal)  1034     25 Kcal/Kg (kcal)  1292.5     30 Kcal/Kg (kcal)  1551     35 Kcal/Kg (kcal)  1809.5     40 Kcal/Kg (kcal)  2068     45 Kcal/Kg (kcal)  2326.5     50 Kcal/Kg (kcal)  2585        Gaston-St. Jeor Equation    RMR (Gaston-St. Jeor Equation)  952.88        Protein Requirements    Est Protein Requirement Amount (gms/kg)  1.2 gm protein 52-62 gm     Estimated Protein  Requirements (gms/day)  62.04        Fluid Requirements    Estimated Fluid Requirement Method  [place]-Segar Formula     [place]-Segar Method (over 20 kg)  2534         Nutrition Prescription Ordered     Row Name 02/04/19 1536          Nutrition Prescription PO    Current PO Diet  Regular         Evaluation of Received Nutrient/Fluid Intake     Row Name 02/04/19 1535          Calculation Measurements    Weight Used For Calculations  51.7 kg (113 lb 15.7 oz)        PO Evaluation    Number of Days PO Intake Evaluated  3 days     Number of Meals  7     % PO Intake  43         Evaluation of Prescribed Nutrient/Fluid Intake     Row Name 02/04/19 1535          Calculation Measurements    Weight Used For Calculations  51.7 kg (113 lb 15.7 oz)             Problem/Interventions:  Problem 1     Row Name 02/04/19 1538          Nutrition Diagnoses Problem 1    Problem 1  Impaired Nutrient Utilization     Etiology (related to)  Medical Diagnosis     Cardiac  CAD;CHF     Signs/Symptoms (evidenced by)  Biochemical     Specific Labs Noted  K+                 Intervention Goal     Row Name 02/04/19 1540          Intervention Goal    General  Meet nutritional needs for age/condition     PO  Meet estimated needs;Increase intake;PO intake (%)     PO Intake %  75 %     Weight  Maintain weight         Nutrition Intervention     Row Name 02/04/19 1540          Nutrition Intervention    RD/Tech Action  Follow Tx progress;Care plan reviewd;Encourage intake         Nutrition Prescription     Row Name 02/04/19 1540          Nutrition Prescription PO    PO Prescription  Other (comment) Continue current diet order     New PO Prescription Ordered?  No, recommended        Other Orders    Obtain Weight  Daily     Obtain Weight Ordered?  No, recommended     Supplement  Vitamin mineral supplement     Supplement Ordered?  No, recommended         Education/Evaluation     Row Name 02/04/19 154          Education    Education  Will Instruct as  appropriate        Monitor/Evaluation    Monitor  Per protocol;I&O;PO intake;Pertinent labs;Weight           Electronically signed by:  Pennie Vazquez RD  02/04/19 3:44 PM

## 2019-02-04 NOTE — PROGRESS NOTES
[facility]    Name:  Jose M Green   Age:  87 y.o.  Sex:  female  :  1931  MRN:  [id number]   Visit Number:  [id number]  Admission Date:  2019  Date Of Service:  19  Primary Care Physician:  Speedy Bravo MD     LOS: 3 days :  Patient Care Team:  Speedy Bravo MD as PCP - General  Speedy Bravo MD as PCP - Family Medicine  Speedy Bravo MD as PCP - Claims Attributed  Speedy Bravo MD:    Chief Complaint:      Cough and shortness of breath    Subjective / Interval History:     Has had complains of left-sided rib pain without associated fever or chills respiratory status is better has had good oral intake. She is up in the chair just now. Has significant anxiety    Review of Systems:   Review of Systems   Constitutional: Positive for fatigue. Negative for activity change, appetite change and fever.   HENT: Negative for congestion, ear discharge, ear pain and trouble swallowing.    Eyes: Negative for photophobia and visual disturbance.   Respiratory: Positive for cough and shortness of breath.    Cardiovascular: Negative for chest pain and palpitations.   Gastrointestinal: Negative for abdominal distention, abdominal pain, constipation, diarrhea, nausea and vomiting.   Endocrine: Negative.    Genitourinary: Negative for dysuria, hematuria and urgency.   Musculoskeletal: Positive for arthralgias. Negative for back pain, joint swelling and myalgias.   Skin: Negative for color change and rash.   Allergic/Immunologic: Negative.    Neurological: Negative for dizziness, weakness, light-headedness and headaches.   Hematological: Negative for adenopathy. Does not bruise/bleed easily.   Psychiatric/Behavioral: Negative for agitation, confusion and dysphoric mood. The patient is not nervous/anxious.          Vital Signs:    Temp:  [97.4 °F (36.3 °C)-98.4 °F (36.9 °C)] 98.1 °F (36.7 °C)  Heart Rate:  [116-131] 131  Resp:  [16-20] 20  BP:  (129-148)/(91-96) 148/96    Intake and output:      Intake/Output Summary (Last 24 hours) at 2/4/2019 1100  Last data filed at 2/4/2019 0955  Gross per 24 hour   Intake 2120 ml   Output --   Net 2120 ml     I/O this shift:  In: 200 [P.O.:200]  Out: -     Physical Examination:  Physical Exam   Constitutional: She is oriented to person, place, and time. She appears well-developed and well-nourished. No distress.   HENT:   Nose: Nose normal.   Mouth/Throat: Oropharynx is clear and moist.   Eyes: Conjunctivae and EOM are normal. No scleral icterus.   Neck: No tracheal deviation present. No thyromegaly present.   Cardiovascular: Normal rate and regular rhythm. Exam reveals no friction rub.   No murmur heard.  Pulmonary/Chest: No respiratory distress. She has no wheezes. She has rales.   Abdominal: Soft. She exhibits no distension and no mass. There is no tenderness. There is no guarding.   Musculoskeletal: Normal range of motion. She exhibits tenderness and deformity.   Lymphadenopathy:     She has no cervical adenopathy.   Neurological: She is alert and oriented to person, place, and time. She has normal reflexes. No cranial nerve deficit. Coordination normal.   Skin: Skin is warm and dry. No rash noted. No erythema.   Psychiatric: She has a normal mood and affect. Her behavior is normal. Judgment and thought content normal.         Laboratory results:    Lab Results (last 24 hours)     Procedure Component Value Units Date/Time    Basic Metabolic Panel [[id number]]  (Abnormal) Collected:  02/04/19 0644    Specimen:  Blood Updated:  02/04/19 0720     Glucose 127 mg/dL      BUN 34 mg/dL      Creatinine 1.00 mg/dL      Sodium 140 mmol/L      Potassium 5.2 mmol/L      Chloride 103 mmol/L      CO2 30.0 mmol/L      Calcium 8.8 mg/dL      eGFR Non African Amer 52 mL/min/1.73      BUN/Creatinine Ratio 34.0     Anion Gap 12.2 mmol/L     Narrative:       The MDRD GFR formula is only valid for adults with stable renal function  between ages 18 and 70.    Hemoglobin A1c [[id number]]  (Normal) Collected:  02/01/19 1824    Specimen:  Blood Updated:  02/04/19 0231     Hemoglobin A1C 5.4 %     Narrative:       Expected HgbA1C results:     3% to 6% HgbA1C      HgbA1C                 Estimated Average Glucose     5%                              97 mg/dl   6%                             126 mg/dl   7%                             154 mg/dl   8%                             183 mg/dl   9%                             212 mg/dl  >10%                           >240 mg/dl      Potassium [[id number]]  (Abnormal) Collected:  02/03/19 1350    Specimen:  Blood Updated:  02/03/19 1408     Potassium 5.3 mmol/L         I have reviewed the patient's laboratory results.    Radiology results:    Imaging Results (last 24 hours)     Procedure Component Value Units Date/Time    XR Ribs 2 View Left [[id number]] Collected:  02/03/19 1858     Updated:  02/03/19 1859    Narrative:       FINAL REPORT    CLINICAL HISTORY:  . left chest muskuloskeletal pain    FINDINGS:  There are age-indeterminate nondisplaced left eighth and ninth  rib fractures with small left pleural effusion.  There is  nodular consolidation right midlung field.  Is consolidative  right lower lobe airspace disease with moderate to large right  pleural effusion.  The heart is enlarged.  Aorta is calcified.  Impression: Age-indeterminate left eighth and ninth rib  fractures  right airspace disease and bilateral effusions.  Cannot exclude right midlung field mass.  Recommend appropriate  follow-up      Impression:       Authenticated by John Harper MD on 02/03/2019 06:58:41 PM          I have reviewed the patient's radiology reports.    Medication Review:     I have reviewed the patients active and prn medications.       Assessment & Plan:    Atrial fibrillation with RVR (CMS/HCC) continues to have poor rate control. Along with beta blockers and amiodarone is on low-dose diltiazem continue to monitor  this    Essential hypertension stable with current meds    Acquired hypothyroidism clinically euthyroid    Acute congestive heart failure (CMS/HCC) with bilateral pleural effusions. We will continue diuresis intermittently as needed currently with signs of intravascular volume depletion. Rate control should also help. O2 supplement as needed    NSTEMI (non-ST elevated myocardial infarction) (CMS/HCC). As per discussion with patient and family medical management only continue with statins, aspirin  Left-sided rib pain with evidence of rib fractures. Not sure if this is acute. Conservative management. Right middle lobe infiltrate versus mass. We will need to repeat x-ray or CT once her CHF is better treated    Discussed plan with patient and her son in law          Speedy Bravo MD  02/04/19  11:00 AM

## 2019-02-04 NOTE — PROGRESS NOTES
Discharge Planning Assessment  [facility]     Patient Name: Jose M Green  MRN: [id number]  Today's Date: 2/4/2019    Admit Date: 2/1/2019    Discharge Needs Assessment     Row Name 02/04/19 0940       Living Environment    Lives With  alone    Current Living Arrangements  home/apartment/condo    Potentially Unsafe Housing Conditions  -- POA denies concerns    Primary Care Provided by  self;other (see comments) Has  Waiver    Provides Primary Care For  no one, unable/limited ability to care for self    Family Caregiver if Needed  none    Quality of Family Relationships  supportive    Able to Return to Prior Arrangements  other (see comments) Family considering rehab       Resource/Environmental Concerns    Resource/Environmental Concerns  none       Transition Planning    Patient/Family Anticipates Transition to  inpatient rehabilitation facility    Patient/Family Anticipated Services at Transition  rehabilitation services    Transportation Anticipated  family or friend will provide       Discharge Needs Assessment    Readmission Within the Last 30 Days  no previous admission in last 30 days    Concerns to be Addressed  denies needs/concerns at this time    Equipment Currently Used at Home  bath bench;cane, straight;commode;rollator    Anticipated Changes Related to Illness  none    Equipment Needed After Discharge  none    Outpatient/Agency/Support Group Needs  inpatient rehabilitation facility    Discharge Facility/Level of Care Needs  rehabilitation facility    Offered/Gave Vendor List  yes        Discharge Plan     Row Name 02/04/19 0944       Plan    Plan  Rehab    Patient/Family in Agreement with Plan  yes    Plan Comments  Spoke with pt and son in law (NASIM Cain) in room re [facility].  Pt lives in an apartment right next door to daughter and son in law.  Pt reports she is ind with ADLs.  She has a person from the waiver program that comes to assist with housekeeping, cooking and bath assistance as needed.   [facility] had [facility] in the past.  She uses a rolling walker to ambulate and has a cane, BSC, and shower seat.   POA feels like pt will need to go to rehab.  He reports that pt has been to [facility] in the past and that is their first choice.   Referral efaxed. Address, phone & PCP verified.  CM will continue to follow and assist with discharge as needed.        Destination      Service Provider Request Status Selected Services Address Phone Number Fax Number    [facility] Pending - Request Sent N/A [address] [address] [phone]      Durable Medical Equipment      No service coordination in this encounter.      Dialysis/Infusion      No service coordination in this encounter.      Home Medical Care      No service coordination in this encounter.      Community Resources      No service coordination in this encounter.        Expected Discharge Date and Time     Expected Discharge Date Expected Discharge Time    Feb 5, 2019         Demographic Summary     Row Name 02/04/19 0938       General Information    Admission Type  inpatient    Arrived From  emergency department    Referral Source  admission list    Reason for Consult  discharge planning    Preferred Language  English     Used During This Interaction  no        Functional Status     Row Name 02/04/19 0939       Functional Status    Usual Activity Tolerance  fair       Functional Status, IADL    Medications  independent    Meal Preparation  assistive person    Housekeeping  assistive person    Laundry  assistive person    Shopping  assistive person       Mental Status    General Appearance WDL  WDL       Mental Status Summary    Recent Changes in Mental Status/Cognitive Functioning  no changes        Psychosocial    No documentation.       Abuse/Neglect    No documentation.       Legal    No documentation.       Substance Abuse    No documentation.       Patient Forms    No documentation.            Shannon Hanson

## 2019-02-04 NOTE — DISCHARGE PLACEMENT REQUEST
"AUSTEN Molina RN  Case Management  Phone:  [phone]; Fax:  [phone]    New referral for ST rehab.    Jose M Green (87 y.o. Female)     Date of Birth Social Security Number Address Home Phone MRN    06/23/1931  [address] [place], [place] [id number] [phone] [id number]    Scientologist Marital Status          [place]        Admission Date Admission Type Admitting Provider Attending Provider Department, Room/Bed    2/1/19 Emergency Speedy Bravo MD Patel, Ashish M, MD [facility] MED SURG  3, 313/1    Discharge Date Discharge Disposition Discharge Destination                       Attending Provider:  Speedy Bravo MD    Allergies:  Codeine, Contrast Dye, Aspirin    Isolation:  None   Infection:  None   Code Status:  No CPR    Ht:  165.1 cm (65\")   Wt:  51.7 kg (114 lb)    Admission Cmt:  None   Principal Problem:  Atrial fibrillation with RVR (CMS/[facility]) [I48.91]                 Active Insurance as of 2/1/2019     Primary Coverage     Payor Plan Insurance Group Employer/Plan Group    MEDICARE MEDICARE A & B      Payor Plan Address Payor Plan Phone Number Payor Plan Fax Number Effective Dates    [address] [phone]  6/1/1996 - None Entered    [facility] [address]       Subscriber Name Subscriber Birth Date Member ID       JOSE M GREEN 6/23/1931 [id number]           Secondary Coverage     Payor Plan Insurance Group Employer/Plan Group    [place]      Payor Plan Address Payor Plan Phone Number Payor Plan Fax Number Effective Dates    [address] [phone]  11/1/2017 - None Entered    [place] [address]       Subscriber Name Subscriber Birth Date Member ID       JOSE M GREEN W 6/23/1931 [id number]                 Emergency Contacts      (Rel.) Home Phone Work Phone Mobile Phone    Toya Ying (Daughter) -- -- [phone]    Ant Ha (Power of ) [phone] -- [phone]    karla ha " (Relative) [phone] -- --               History & Physical      Speedy Bravo MD at 2019  5:59 PM              [facility]   HISTORY AND PHYSICAL      Name:  Jose M Green   Age:  87 y.o.  Sex:  female  :  1931  MRN:  [id number]   Visit Number:  [id number]  Admission Date:  2019  Date Of Service:  19  Primary Care Physician:  Speedy Bravo MD    Chief Complaint:     87-year-old female with long-standing history of coronary artery disease, paroxysmal atrial fibrillation brought into the emergency room with increasing shortness of breath without associated fever or chills    History Of Presenting Illness:    Patient presented to the ER with shortness of breath noted to have a markedly elevated BNP first set of troponin level was elevated. She denied chest pain and denied lightheadedness dizziness. No associated fever or chills  She was noted to be in atrial fibrillation with a rapid ventricular rate of around 130 bpm. Given a diltiazem bolus and now is on a Cardizem drip. She was given IV Lasix with good diuresis and significant improvement in her O2 saturation.  Chest x-ray without evidence of infiltrate. VQ scan shows low probability for PE.  Currently she is resting comfortably on O2 at 3 L via nasal cannula maintaining an O2 sat up to 98%      Review Of Systems:   Review of Systems   Constitutional: Positive for fatigue. Negative for activity change, appetite change and fever.   HENT: Negative for congestion, ear discharge, ear pain and trouble swallowing.    Eyes: Negative for photophobia and visual disturbance.   Respiratory: Positive for cough and shortness of breath.    Cardiovascular: Negative for chest pain and palpitations.   Gastrointestinal: Negative for abdominal distention, abdominal pain, constipation, diarrhea, nausea and vomiting.   Endocrine: Negative.    Genitourinary: Positive for difficulty urinating. Negative for dysuria, hematuria and  urgency.   Musculoskeletal: Positive for arthralgias and gait problem. Negative for back pain, joint swelling and myalgias.   Skin: Negative for color change and rash.   Allergic/Immunologic: Negative.    Neurological: Negative for dizziness, weakness, light-headedness and headaches.   Hematological: Negative for adenopathy. Does not bruise/bleed easily.   Psychiatric/Behavioral: Positive for sleep disturbance. Negative for agitation, confusion and dysphoric mood. The patient is nervous/anxious.         Past Medical History:    Past Medical History:   Diagnosis Date   • Chronic lymphocytic leukemia (CMS/HCC)    • CLL (chronic lymphocytic leukemia) (CMS/HCC)    • DVT (deep venous thrombosis) (CMS/[facility])    • Inguinal hernia    • Kidney infection        Past Surgical history:    Past Surgical History:   Procedure Laterality Date   • CARDIOVERSION     •  SECTION     • CYSTOSCOPY BLADDER BIOPSY N/A 2017    Procedure: CYSTOSCOPY BLADDER BIOPSY;  Surgeon: Obed Avalos MD;  Location: [facility];  Service:    • CYSTOSCOPY CYSTOGRAM N/A 2017    Procedure: CYSTOSCOPY CYSTOGRAM, EVACUATION OF BLADDER CLOTS;  Surgeon: Obed Avalos MD;  Location: [facility];  Service:    • HYSTERECTOMY     • OTHER SURGICAL HISTORY      Both Arm Plates   • VARICOSE VEIN SURGERY         Social History:  Pediatric History   Patient Guardian Status   • Not on file     Other Topics Concern   • Not on file   Social History Narrative   • Not on file       Family History:    Family History   Problem Relation Age of Onset   • Arthritis Mother    • Heart disease Mother    • Hypertension Mother    • Kidney disease Mother    • Heart disease Father    • Hypertension Father    • Arthritis Sister    • COPD Sister    • Heart disease Sister        Allergies:      Codeine; Contrast dye; and Aspirin    Home Medications:    Prior to Admission Medications     Prescriptions Last Dose Informant Patient Reported? Taking?    aspirin 81 MG EC tablet  1/31/2019  No Yes    Take 1 tablet by mouth Daily.    atorvastatin (LIPITOR) 40 MG tablet 1/31/2019  No Yes    Take 1 tablet by mouth Daily.    docusate sodium (DOK) 100 MG capsule 1/31/2019  No Yes    Take 1 capsule by mouth 2 (Two) Times a Day As Needed for Constipation.    levothyroxine (SYNTHROID, LEVOTHROID) 100 MCG tablet 1/31/2019  No Yes    Take 1 tablet by mouth Daily. [id number]    metoprolol tartrate (LOPRESSOR) 50 MG tablet 1/31/2019  No Yes    Take 1 tablet by mouth 2 (Two) Times a Day.    Mirabegron ER (MYRBETRIQ) 25 MG tablet sustained-release 24 hour 24 hr tablet 1/31/2019  No Yes    Take 1 tablet by mouth Daily.    mirtazapine (REMERON) 30 MG tablet 1/31/2019  No Yes    TAKE 1 TABLET BY MOUTH IN THE EVENING     omeprazole (priLOSEC) 20 MG capsule 1/31/2019  No Yes    Take 1 capsule by mouth Daily.             ED Medications:    Medications   diltiaZEM (CARDIZEM) 125 mg in sodium chloride 0.9 % 125 mL (1 mg/mL) infusion (10 mg/hr Intravenous Rate/Dose Change 2/1/19 1209)   aspirin EC tablet 81 mg (not administered)   oxybutynin XL (DITROPAN-XL) 24 hr tablet 5 mg (not administered)   levothyroxine (SYNTHROID, LEVOTHROID) tablet 100 mcg (not administered)   pantoprazole (PROTONIX) EC tablet 40 mg (not administered)   atorvastatin (LIPITOR) tablet 40 mg (not administered)   docusate sodium (COLACE) capsule 100 mg (not administered)   metoprolol tartrate (LOPRESSOR) tablet 50 mg (not administered)   mirtazapine (REMERON) tablet 30 mg (not administered)   sodium chloride 0.9 % flush 3 mL (not administered)   sodium chloride 0.9 % flush 3-10 mL (not administered)   enoxaparin (LOVENOX) syringe 30 mg (not administered)   acetaminophen (TYLENOL) tablet 650 mg (not administered)   ALPRAZolam (XANAX) tablet 0.25 mg (not administered)   temazepam (RESTORIL) capsule 15 mg (not administered)   magnesium hydroxide (MILK OF MAGNESIA) suspension 2400 mg/10mL 10 mL (not administered)   diltiazem (CARDIZEM) bolus from bag  1 mg/mL 10 mg (10 mg Intravenous Bolus from Bag 2/1/19 1059)   furosemide (LASIX) injection 40 mg (40 mg Intravenous Given 2/1/19 1206)   aspirin chewable tablet 324 mg (324 mg Oral Given 2/1/19 1202)   enoxaparin (LOVENOX) syringe 50 mg (50 mg Subcutaneous Given 2/1/19 1204)   LORazepam (ATIVAN) tablet 0.25 mg (0.25 mg Oral Given 2/1/19 1600)   technetium albumin aggregated (MAA) solution 1 dose (1 dose Intravenous Given 2/1/19 1540)       Vital Signs:    Temp:  [98.7 °F (37.1 °C)] 98.7 °F (37.1 °C)  Heart Rate:  [] 96  Resp:  [20] 20  BP: (122-185)/() 152/83    No intake or output data in the 24 hours ending 02/01/19 1759        02/01/19  0942   Weight: 53.1 kg (117 lb)       Body mass index is 19.47 kg/m².    Physical Exam:  Physical Exam   Constitutional: She is oriented to person, place, and time. She appears well-developed and well-nourished. No distress.   HENT:   Nose: Nose normal.   Mouth/Throat: Oropharynx is clear and moist.   Eyes: Conjunctivae and EOM are normal. No scleral icterus.   Neck: No tracheal deviation present. No thyromegaly present.   Cardiovascular: Normal rate. Exam reveals no friction rub.   No murmur heard.  Pulmonary/Chest: No respiratory distress. She has no wheezes. She has rales.   Abdominal: Soft. She exhibits no distension and no mass. There is no tenderness. There is no guarding.   Musculoskeletal: Normal range of motion. She exhibits edema and deformity.   Lymphadenopathy:     She has no cervical adenopathy.   Neurological: She is alert and oriented to person, place, and time. She has normal reflexes. No cranial nerve deficit. Coordination normal.   Skin: Skin is warm and dry. No rash noted. No erythema.   Psychiatric: She has a normal mood and affect. Her behavior is normal. Judgment and thought content normal.       EKG:    Atrial fibrillation with a ventricular rate around 130. No acute ST-T abnormalities    Labs:    Lab Results (last 24 hours)     Procedure  Component Value Units Date/Time    Troponin [[id number]]  (Abnormal) Collected:  02/01/19 1032    Specimen:  Blood Updated:  02/01/19 1123     Troponin I 0.746 ng/mL     Narrative:       Normal Patient Upper Reference Limit (URL) (99th Percentile)=0.03 ng/mL   Non-AMI Illness Reference Limit=0.03-0.11 ng/mL   AMI Confirmation=0.12 ng/mL and above    BNP [[id number]]  (Abnormal) Collected:  02/01/19 1032    Specimen:  Blood Updated:  02/01/19 1103     proBNP 18,200.0 pg/mL     Comprehensive Metabolic Panel [[id number]]  (Abnormal) Collected:  02/01/19 1032    Specimen:  Blood Updated:  02/01/19 1101     Glucose 109 mg/dL      BUN 40 mg/dL      Creatinine 1.00 mg/dL      Sodium 138 mmol/L      Potassium 4.5 mmol/L      Chloride 99 mmol/L      CO2 31.0 mmol/L      Calcium 9.4 mg/dL      Total Protein 7.0 g/dL      Albumin 4.00 g/dL      ALT (SGPT) 59 U/L      AST (SGOT) 86 U/L      Alkaline Phosphatase 367 U/L      Total Bilirubin 0.4 mg/dL      eGFR Non African Amer 52 mL/min/1.73      Globulin 3.0 gm/dL      A/G Ratio 1.3 g/dL      BUN/Creatinine Ratio 40.0     Anion Gap 12.5 mmol/L     Narrative:       The MDRD GFR formula is only valid for adults with stable renal function between ages 18 and 70.    D-dimer, Quantitative [[id number]]  (Abnormal) Collected:  02/01/19 0948    Specimen:  Blood Updated:  02/01/19 1033     D-Dimer, Quantitative 1,996 ng/mL (FEU)     CBC & Differential [[id number]] Collected:  02/01/19 0948    Specimen:  Blood Updated:  02/01/19 0958    Narrative:       The following orders were created for panel order CBC & Differential.  Procedure                               Abnormality         Status                     ---------                               -----------         ------                     CBC Auto Differential[[id number]]        Abnormal            Final result                 Please view results for these tests on the individual orders.    CBC Auto Differential [[id number]]  (Abnormal)  Collected:  02/01/19 0948    Specimen:  Blood Updated:  02/01/19 0958     WBC 10.18 10*3/mm3      RBC 3.95 10*6/mm3      Hemoglobin 11.4 g/dL      Hematocrit 36.0 %      MCV 91.1 fL      MCH 28.9 pg      MCHC 31.7 g/dL      RDW 15.9 %      RDW-SD 52.6 fl      MPV 10.4 fL      Platelets 258 10*3/mm3      Neutrophil % 77.3 %      Lymphocyte % 14.5 %      Monocyte % 7.2 %      Eosinophil % 0.1 %      Basophil % 0.2 %      Immature Grans % 0.7 %      Neutrophils, Absolute 7.87 10*3/mm3      Lymphocytes, Absolute 1.48 10*3/mm3      Monocytes, Absolute 0.73 10*3/mm3      Eosinophils, Absolute 0.01 10*3/mm3      Basophils, Absolute 0.02 10*3/mm3      Immature Grans, Absolute 0.07 10*3/mm3      nRBC 0.0 /100 WBC           Radiology:    Imaging Results (last 72 hours)     Procedure Component Value Units Date/Time    NM Lung Ventilation Perfusion [[id number]] Collected:  02/01/19 1512     Updated:  02/01/19 1515    Narrative:       PROCEDURE: NM LUNG VENTILATION PERFUSION-     HISTORY: rule out PE; I50.9-Heart failure, unspecified; I21.4-Non-ST  elevation (NSTEMI) myocardial infarction; I48.91-Unspecified atrial  fibrillation     PROCEDURE: The patient was injected with 5.2 mCi of Tc 99m MAA. The  patient was unable to do the ventilation portion of the exam. Images  over the lungs were obtained in multiple projections.     COMPARISON: Chest x-ray dated Today.     FINDINGS: There is homogeneous perfusion of both lungs.           Impression:       Low probability for pulmonary embolus.     This report was finalized on 2/1/2019 3:13 PM by Lori Daugherty M.D..    XR Chest 2 View [[id number]] Collected:  02/01/19 1008     Updated:  02/01/19 1013    Narrative:       PROCEDURE: XR CHEST 2 VW-        HISTORY: dyspnea     COMPARISON: July 15, 2018.     FINDINGS: The heart is enlarged. The mediastinum is unremarkable. There  is interstitial pulmonary edema with right greater than left pleural  effusions as well as elevation of the  "right hemidiaphragm. There is no  pneumothorax. There are no acute osseous abnormalities.           Impression:       Interstitial pulmonary edema with bilateral pleural  effusions.                 This report was finalized on 2/1/2019 10:09 AM by Lori Daugherty M.D..          Assessment & Plan:    Acute congestive heart failure (CMS/HCC) she has responded well to Lasix. Should see significant improvement with controlled ventricular rate. Cardiology consult has been obtained follow electrolytes    Atrial fibrillation with RVR (CMS/HCC) on diltiazem drip continue with metoprolol she is not a good candidate for long-term anticoagulation therapy in view of frequent recurring falls.    NSTEMI (non-ST elevated myocardial infarction) (CMS/HCC) continue with conservative measures has been given aspirin and a dose of Lovenox continue with beta blockers, statins    Patient does not want aggressive interventional measures no CPR intubation or cardioversion            Speedy Bravo MD  02/01/19  5:59 PM        Electronically signed by Speedy Bravo MD at 2/1/2019  6:06 PM       Hospital Medications (active)       Dose Frequency Start End    acetaminophen (TYLENOL) tablet 650 mg 650 mg Every 4 Hours PRN 2/1/2019     Sig - Route: Take 2 tablets by mouth Every 4 (Four) Hours As Needed for Mild Pain . - Oral    ALPRAZolam (XANAX) tablet 0.25 mg 0.25 mg 2 Times Daily PRN 2/1/2019 2/11/2019    Sig - Route: Take 1 tablet by mouth 2 (Two) Times a Day As Needed for Anxiety (Agitation). - Oral    amiodarone (PACERONE) tablet 200 mg 200 mg Every 24 Hours Scheduled 2/1/2019     Sig - Route: Take 1 tablet by mouth Daily. - Oral    amiodarone (PACERONE) tablet 200 mg 200 mg Once 2/2/2019     Sig - Route: Take 1 tablet by mouth 1 (One) Time. - Oral    Linked Group 1:  \"Followed by\" Linked Group Details        apixaban (ELIQUIS) tablet 2.5 mg 2.5 mg Every 12 Hours Scheduled 2/1/2019     Sig - Route: Take 1 tablet by mouth Every 12 " (Twelve) Hours. - Oral    aspirin EC tablet 81 mg 81 mg Daily 2/2/2019     Sig - Route: Take 1 tablet by mouth Daily. - Oral    atorvastatin (LIPITOR) tablet 40 mg 40 mg Nightly 2/1/2019     Sig - Route: Take 1 tablet by mouth Every Night. - Oral    diltiaZEM CD (CARDIZEM CD) 24 hr capsule 180 mg 180 mg Every 24 Hours Scheduled 2/3/2019     Sig - Route: Take 1 capsule by mouth Daily. - Oral    docusate sodium (COLACE) capsule 100 mg 100 mg 2 Times Daily PRN 2/1/2019     Sig - Route: Take 1 capsule by mouth 2 (Two) Times a Day As Needed for Constipation. - Oral    HYDROcodone-acetaminophen (NORCO) 5-325 MG per tablet 1 tablet 1 tablet Every 6 Hours PRN 2/3/2019 2/13/2019    Sig - Route: Take 1 tablet by mouth Every 6 (Six) Hours As Needed for Moderate Pain . - Oral    levothyroxine (SYNTHROID, LEVOTHROID) tablet 100 mcg 100 mcg Every Early Morning 2/2/2019     Sig - Route: Take 1 tablet by mouth Every Morning. - Oral    magnesium hydroxide (MILK OF MAGNESIA) suspension 2400 mg/10mL 10 mL 10 mL Daily PRN 2/1/2019     Sig - Route: Take 10 mL by mouth Daily As Needed for Constipation. - Oral    metoprolol tartrate (LOPRESSOR) tablet 50 mg 50 mg Every 12 Hours Scheduled 2/3/2019     Sig - Route: Take 1 tablet by mouth Every 12 (Twelve) Hours. - Oral    mirtazapine (REMERON) tablet 30 mg 30 mg Nightly 2/1/2019     Sig - Route: Take 2 tablets by mouth Every Night. - Oral    ondansetron (ZOFRAN) injection 4 mg 4 mg Every 6 Hours PRN 2/3/2019     Sig - Route: Infuse 2 mL into a venous catheter Every 6 (Six) Hours As Needed for Nausea or Vomiting. - Intravenous    oxybutynin XL (DITROPAN-XL) 24 hr tablet 5 mg 5 mg Daily 2/2/2019     Sig - Route: Take 1 tablet by mouth Daily. - Oral    pantoprazole (PROTONIX) EC tablet 40 mg 40 mg Every Morning Before Breakfast 2/2/2019     Sig - Route: Take 1 tablet by mouth Every Morning Before Breakfast. - Oral    sodium chloride 0.9 % infusion 50 mL/hr Continuous 2/3/2019     Sig - Route:  Infuse 50 mL/hr into a venous catheter Continuous. - Intravenous    sodium polystyrene (KAYEXALATE) 15 GM/60ML suspension 15 g 15 g Once 2/3/2019 2/3/2019    Sig - Route: Take 60 mL by mouth 1 (One) Time. - Oral    temazepam (RESTORIL) capsule 15 mg 15 mg Nightly PRN 2019    Sig - Route: Take 1 capsule by mouth At Night As Needed for Sleep. - Oral             Physician Progress Notes (last 24 hours) (Notes from 2/3/2019  8:52 AM through 2019  8:52 AM)      Skip Luna MD at 2/3/2019  6:01 PM                [facility]    PROGRESS NOTE    Name:  Jose M Green   Age:  87 y.o.  Sex:  female  :  1931  MRN:  [id number]   Visit Number:  [id number]  Admission Date:  2019  Date Of Service:  19  Primary Care Physician:  Speedy Bravo MD     LOS: 2 days :  Patient Care Team:  Speedy Bravo MD as PCP - General  Speedy Bravo MD as PCP - Family Medicine  Speedy Bravo MD as PCP - Claims Attributed  Speedy Bravo MD:    Chief Complaint:      Left flank and back pain.    Subjective / Interval History:     Ms. Green is currently sitting up on the bed and is complaining of pain in the left side of her back.  She was admitted on 2019 with atrial fibrillation and rapid ventricular rate.  She was seen by Dr. Ramirez and was placed on amiodarone drip and is currently on oral amiodarone with controlled heart rate.  She denies any chest pain or shortness of breath at this time.  Previous physician documentation, laboratory and imaging data been reviewed.    Review of Systems:     General ROS: Patient denies any fevers, chills or loss of consciousness.  Respiratory ROS: Denies cough or shortness of breath.  Cardiovascular ROS: Denies chest pain or palpitations. No history of exertional chest pain.  Gastrointestinal ROS: Denies nausea and vomiting. Denies any abdominal pain. No diarrhea.  Neurological ROS: Denies any focal weakness. No loss of  consciousness. Denies any numbness.  Dermatological ROS: Denies any redness or pruritis.    Vital Signs:    Temp:  [97.3 °F (36.3 °C)-97.9 °F (36.6 °C)] 97.4 °F (36.3 °C)  Heart Rate:  [] 128  Resp:  [16-17] 16  BP: (129-150)/(68-95) 129/91    Intake and output:    I/O last 3 completed shifts:  In: 1680 [P.O.:1380; I.V.:300]  Out: 400 [Urine:400]  I/O this shift:  In: 1480 [P.O.:480; I.V.:1000]  Out: -     Physical Examination:    General Appearance:  Alert and cooperative, not in any acute distress.   Head:  Atraumatic and normocephalic, without obvious abnormality.   Eyes:          PERRLA, conjunctivae and sclerae normal, no Icterus. No pallor. Extra-occular movements are within normal limits.   Neck: Supple, trachea midline, no thyromegaly, no carotid bruit.   Lungs:   Kyphoscoliosis is present.  No ecchymosis noted on the back.  She does have tenderness in the left great and left lower back.  Breath sounds heard bilaterally equally.  No crackles or wheezing. No Pleural rub or bronchial breathing.   Heart:  Irregular S1 and S2, 2/6 systolic murmur, no gallop, no rub. No JVD   Abdomen:   Normal bowel sounds, no masses, no organomegaly. Soft, non-tender, non-distended, no guarding, no rebound tenderness.   Extremities: Moves all extremities well, 1+ edema, no cyanosis, no clubbing.   Skin: No bleeding, bruising or rash.   Neurologic: Awake, alert and oriented times 3. Moves all 4 extremities equally.     Laboratory results:    Results from last 7 days   Lab Units 02/03/19  1350 02/03/19  0650 02/02/19  0635 02/01/19  1824 02/01/19  1032   SODIUM mmol/L  --  137 137 137 138   POTASSIUM mmol/L 5.3* 6.1* 4.7 3.7 4.5   CHLORIDE mmol/L  --  103 101 98 99   CO2 mmol/L  --  28.0 30.0 30.0 31.0*   BUN mg/dL  --  37* 37* 38* 40*   CREATININE mg/dL  --  1.00 1.10 0.90 1.00   CALCIUM mg/dL  --  9.0 8.7 8.9 9.4   BILIRUBIN mg/dL  --   --   --   --  0.4   ALK PHOS U/L  --   --   --   --  367*   ALT (SGPT) U/L  --   --    --   --  59   AST (SGOT) U/L  --   --   --   --  86*   GLUCOSE mg/dL  --  114* 98 92 109*     Results from last 7 days   Lab Units 02/03/19  0650 02/02/19  0635 02/01/19  1824   WBC 10*3/mm3 9.96 10.96* 9.04   HEMOGLOBIN g/dL 12.0 11.8* 11.5*   HEMATOCRIT % 39.2 38.4 37.2   PLATELETS 10*3/mm3 262 195 217         Results from last 7 days   Lab Units 02/02/19  0635 02/01/19  1032   TROPONIN I ng/mL 0.525* 0.746*           I have reviewed the patient's laboratory results.    Radiology results:    Imaging Results (last 24 hours)     ** No results found for the last 24 hours. **        Medication Review:     I have reviewed the patients active and prn medications.       Acute congestive heart failure (CMS/HCC)    Atrial fibrillation with RVR (CMS/HCC)    NSTEMI (non-ST elevated myocardial infarction) (CMS/[facility])    Assessment:    1.  Atrial fibrillation with rapid ventricular rate, present on admission.  2.  Type II myocardial infarction secondary to #1, present on admission.  3.  Acute hypokalemia, improving.  4.  Severe pulmonary hypertension.  5.  Acquired hypothyroidism.  6.  Essential hypertension.    Plan:    Ms. Green is currently on oral amiodarone and her heart rate is fairly controlled.  She is being followed by Dr. Ramirez.  She is currently on aspirin and apixaban.  She will also be continued on amiodarone, Cardizem, metoprolol and atorvastatin.  He has been given Kayexalate this morning for mild elevation of potassium levels.  We will repeat her basic metabolic panel in the morning.  She is complaining of left back pain.  I will get left-sided rib views to rule out any rib fractures.  She will also be started on physical and occupational therapy.  Further recommendations depend upon her clinical course    Skip Luna MD  02/03/19  6:01 PM    Dictated utilizing Dragon dictation.      Electronically signed by Skip Luna MD at 2/3/2019  6:30 PM       Physical Therapy Notes (last 24 hours) (Notes from  2/3/2019  8:52 AM through 2/4/2019  8:52 AM)     No notes of this type exist for this encounter.        Occupational Therapy Notes (last 24 hours) (Notes from 2/3/2019  8:52 AM through 2/4/2019  8:52 AM)     No notes of this type exist for this encounter.

## 2019-02-05 NOTE — PLAN OF CARE
Problem: Patient Care Overview  Goal: Plan of Care Review  Outcome: Ongoing (interventions implemented as appropriate)   02/05/19 9857   Coping/Psychosocial   Plan of Care Reviewed With patient   Plan of Care Review   Progress improving   OTHER   Outcome Summary Pt ambulated x12' with rw and cga-min a. Pt was able to tolerate increased activity this treatment. See flowsheet for details.

## 2019-02-05 NOTE — PROGRESS NOTES
[facility]    PROGRESS NOTE    Name:  Jose M Green   Age:  87 y.o.  Sex:  female  :  1931  MRN:  [id number]   Visit Number:  [id number]  Admission Date:  2019  Date Of Service:  19  Primary Care Physician:  Speedy Bravo MD     LOS: 4 days :      Chief Complaint:  Follow-up A. fib with CHF exacerbation        Subjective / Interval History: The patient was seen today covering for Dr. Bravo as he requested.  The patient was seen this morning sitting up in bed.  Earlier this morning, she reports feeling sleepy but rested very well last night.  She denies chest pain, shortness of breath, abdominal pain.  She does complain of some intermittent aching in her joints mostly in her left hip.  No acute events occurred overnight.  Her potassium is mildly elevated and treatment was already initiated with Kayexalate and normal saline fluid.  She had been previously diuresis.  I discussed the case with Dr. Bravo.      Review of Systems:     General ROS: Patient denies any fevers, chills or loss of consciousness.  Positive generalized weakness  Ophthalmic ROS: Denies any diplopia or transient loss of vision.  ENT ROS: Denies sore throat, nasal congestion or ear pain.   Respiratory ROS: Denies cough or shortness of breath.  Cardiovascular ROS: Denies chest pain or palpitations. No history of exertional chest pain.   Gastrointestinal ROS: Denies nausea and vomiting. Denies any abdominal pain. No diarrhea.  Genito-Urinary ROS: Denies dysuria or hematuria.  Musculoskeletal ROS: Complains of chronic aches and pain including left hip pain without trauma.  No muscle pain. No calf pain.  Neurological ROS: Denies any focal weakness. No loss of consciousness. Denies any numbness. Denies neck pain.   Dermatological ROS: Denies any redness or pruritis.    Vital Signs:    Temp:  [97.1 °F (36.2 °C)-98.2 °F (36.8 °C)] 97.1 °F (36.2 °C)  Heart Rate:  [] 76  Resp:  [18-20] 20  BP:  ()/() 104/74    Intake and output:    I/O last 3 completed shifts:  In: 1260 [P.O.:760; I.V.:500]  Out: -   I/O this shift:  In: 540 [P.O.:240; I.V.:300]  Out: 400 [Urine:400]    Physical Examination:    General Appearance:    Elderly lady sitting up in bed smiling.  Alert and cooperative, not in any acute distress.   Head:    Atraumatic and normocephalic   Eyes:            PERRLA, EOMI .   Throat:   No oral lesions, no thrush, oral mucosa moist.   Neck:   Supple, trachea midline, no thyromegaly   Lungs:     Chest shape is normal. Breath sounds heard bilaterally equally.  Bilateral lower lobe trace crackles without wheezing. No Pleural rub or bronchial breathing.    Heart:    Normal S1 and S2, no murmur, no gallop   Abdomen:     Soft, nontender, nondistended    Extremities:   Moves all extremities well, no edema, no cyanosis, no             clubbing   Skin:   No bleeding, bruising or rash.   Neurologic:   No tremor, sensation intact, Motor power is equal but diffusely weak    Laboratory results:    Lab Results (last 24 hours)     Procedure Component Value Units Date/Time    Basic Metabolic Panel [[id number]]  (Abnormal) Collected:  02/05/19 0547    Specimen:  Blood Updated:  02/05/19 0712     Glucose 111 mg/dL      BUN 43 mg/dL      Creatinine 1.10 mg/dL      Sodium 139 mmol/L      Potassium 5.9 mmol/L      Chloride 104 mmol/L      CO2 27.0 mmol/L      Calcium 8.8 mg/dL      eGFR Non African Amer 47 mL/min/1.73      BUN/Creatinine Ratio 39.1     Anion Gap 13.9 mmol/L     Narrative:       The MDRD GFR formula is only valid for adults with stable renal function between ages 18 and 70.          I have reviewed the patient's laboratory results.    Radiology results:    Imaging Results (last 24 hours)     ** No results found for the last 24 hours. **          I have reviewed the patient's radiology reports.    Medication Review:     I have reviewed the patients active and prn medications.      Assessment:  1.atrial fib with RVR  2.acute CHF exacerbation, suspect diastolic, with bilateral pleural effusions post diuresis, with intravascular volume depletion  3. Hyperkalemia, treated  4. Acute hypoxia  5. Debility    ADDENDUM: patient had one episode of trace hematuria with one blood clot removed from periurethral/vaginal area and patient reported history of Abnormal uterine bleeding. She is fortunately not on anticoagulation. She already got Asa today 81mg. Will repeat H & H now and in AM. Dr Bravo may follow up.    Plan:  Continue iv normal saline low dose as ordered by Dr Bravo. Kayexalate already ordered. Repeat potassium, bmp tomorrow. Further orders per Dr Bravo. Anticipate further improvement. Continue PT, OT, telemetry monitoring.    Medication risks and benefits were discussed in detail. Patient reported satisfaction with care delivered and treatment plan.     Jolene Martinez DO  02/05/19  1:04 PM

## 2019-02-05 NOTE — PLAN OF CARE
Problem: Patient Care Overview  Goal: Plan of Care Review  Outcome: Ongoing (interventions implemented as appropriate)   02/04/19 1951   Coping/Psychosocial   Plan of Care Reviewed With patient   OTHER   Outcome Summary Patient participates well skilled PT evaluation and demonstrates decreased strength, balance, transfers and gait. She is expected to benefit from additional PT while hospitalized.

## 2019-02-05 NOTE — THERAPY TREATMENT NOTE
Acute Care - Occupational Therapy Treatment Note   Ankit     Patient Name: Jose M Green  : 1931  MRN: [id number]  Today's Date: 2019  Onset of Illness/Injury or Date of Surgery: 19  Date of Referral to OT: 19  Referring Physician: Dr. Luna    Admit Date: 2019       ICD-10-CM ICD-9-CM   1. Acute congestive heart failure, unspecified heart failure type (CMS/[facility]) I50.9 428.0   2. NSTEMI (non-ST elevated myocardial infarction) (CMS/[facility]) I21.4 410.70   3. Atrial fibrillation with RVR (CMS/[facility]) I48.91 427.31   4. Impaired functional mobility, balance, gait, and endurance Z74.09 V49.89   5. Impaired mobility and ADLs Z74.09 799.89     Patient Active Problem List   Diagnosis   • Chronic lymphocytic leukemia (CMS/[facility])   • Essential hypertension   • Acquired hypothyroidism   • Right knee pain   • Paroxysmal atrial fibrillation (CMS/[facility])   • Coronary artery disease involving native coronary artery of native heart without angina pectoris   • Acute cystitis with hematuria   • Hematuria   • Interstitial cystitis   • Acute congestive heart failure (CMS/[facility])   • Atrial fibrillation with RVR (CMS/[facility])   • NSTEMI (non-ST elevated myocardial infarction) (CMS/[facility])     Past Medical History:   Diagnosis Date   • Chronic lymphocytic leukemia (CMS/[facility])    • CLL (chronic lymphocytic leukemia) (CMS/[facility])    • DVT (deep venous thrombosis) (CMS/[facility])    • Inguinal hernia    • Kidney infection      Past Surgical History:   Procedure Laterality Date   • CARDIOVERSION     •  SECTION     • CYSTOSCOPY BLADDER BIOPSY N/A 2017    Procedure: CYSTOSCOPY BLADDER BIOPSY;  Surgeon: Obed Avalos MD;  Location: [facility] OR;  Service:    • CYSTOSCOPY CYSTOGRAM N/A 2017    Procedure: CYSTOSCOPY CYSTOGRAM, EVACUATION OF BLADDER CLOTS;  Surgeon: Obed Avalos MD;  Location: [facility];  Service:    • HYSTERECTOMY     • OTHER SURGICAL HISTORY      Both Arm Plates   • VARICOSE VEIN SURGERY         Therapy  Treatment    Rehabilitation Treatment Summary     Row Name 02/05/19 1408 02/05/19 1119          Treatment Time/Intention    Discipline  occupational therapist  -SD  physical therapy assistant  -RM     Document Type  therapy note (daily note)  -SD  therapy note (daily note)  -RM     Subjective Information  complains of;weakness;fatigue  -SD  complains of;nausea/vomiting;fatigue  -RM     Mode of Treatment  occupational therapy  -SD  physical therapy  -RM     Patient/Family Observations  Dtr present  -SD  --     Care Plan Review  care plan/treatment goals reviewed  -SD  care plan/treatment goals reviewed  -RM     Patient Effort  adequate  -SD  adequate  -RM     Existing Precautions/Restrictions  fall  -SD  fall  -RM     Recorded by [SD] Priscilla Cerna, OT 02/05/19 1534 [RM] Ty Cui, PTA 02/05/19 1346     Row Name 02/05/19 1408             Vital Signs    Pretreatment Heart Rate (beats/min)  74  -SD      Intratreatment Heart Rate (beats/min)  95  -SD      Posttreatment Heart Rate (beats/min)  80  -SD      Recorded by [SD] Priscilla Cerna, OT 02/05/19 1534      Row Name 02/05/19 1119             Bed Mobility Assessment/Treatment    Bed Mobility Assessment/Treatment  supine-sit  -RM      Supine-Sit [place] (Bed Mobility)  minimum assist (75% patient effort)  -RM      Bed Mobility, Safety Issues  decreased use of arms for pushing/pulling;decreased use of legs for bridging/pushing  -RM      Assistive Device (Bed Mobility)  bed rails;head of bed elevated  -RM      Recorded by [RM] Ty Cui, PTA 02/05/19 1346      Row Name 02/05/19 1119             Sit-Stand Transfer    Sit-Stand [place] (Transfers)  minimum assist (75% patient effort)  -RM      Assistive Device (Sit-Stand Transfers)  walker, front-wheeled  -RM      Recorded by [RM] Ty Cui, PTA 02/05/19 1346      Row Name 02/05/19 1119             Stand-Sit Transfer    Stand-Sit [place] (Transfers)  minimum assist (75% patient  effort)  -RM      Assistive Device (Stand-Sit Transfers)  walker, front-wheeled  -RM      Recorded by [RM] Ty Cui, PTA 02/05/19 1346      Row Name 02/05/19 1119             Gait/Stairs Assessment/Training    [place] Level (Gait)  contact guard;minimum assist (75% patient effort);verbal cues  -RM      Assistive Device (Gait)  walker, front-wheeled  -RM      Distance in Feet (Gait)  12  -RM      Pattern (Gait)  step-to;step-through  -RM      Deviations/Abnormal Patterns (Gait)  base of support, wide;gait speed decreased;stride length decreased  -RM      Recorded by [RM] Ty Cui, PTA 02/05/19 1346      Row Name 02/05/19 1408             Motor Skills Assessment/Interventions    Additional Documentation  Therapeutic Exercise (Group)  -SD      Recorded by [SD] Priscilla Cerna, OT 02/05/19 1534      Row Name 02/05/19 1408             Therapeutic Exercise    Upper Extremity Range of Motion (Therapeutic Exercise)  shoulder flexion/extension, bilateral;shoulder abduction/adduction, bilateral;shoulder horizontal abduction/adduction, bilateral;elbow flexion/extension, bilateral  -SD      Exercise Type (Therapeutic Exercise)  AROM (active range of motion)  -SD      Position (Therapeutic Exercise)  seated  -SD      Sets/Reps (Therapeutic Exercise)  1 x 15  -SD      Expected Outcome (Therapeutic Exercise)  improve functional tolerance, self-care activity  -SD      Recorded by [SD] Priscilla Cerna, OT 02/05/19 1534      Row Name 02/05/19 1408 02/05/19 1119          Positioning and Restraints    Pre-Treatment Position  sitting in chair/recliner  -SD  in bed  -RM     Post Treatment Position  chair  -SD  chair  -RM     In Chair  sitting;encouraged to call for assist;call light within reach;exit alarm on  -SD  reclined;call light within reach;exit alarm on;encouraged to call for assist;notified nsg  -RM     Recorded by [SD] Priscilla Cerna, OT 02/05/19 1534 [RM] Ty Cui, PTA 02/05/19 1346     Row  Name 02/05/19 1408 02/05/19 1119          Pain Scale: Numbers Pre/Post-Treatment    Pain Scale: Numbers, Pretreatment  0/10 - no pain  -SD  0/10 - no pain  -RM     Pain Scale: Numbers, Post-Treatment  0/10 - no pain  -SD  0/10 - no pain  -RM     Recorded by [SD] Priscilla Cerna OT 02/05/19 1534 [RM] Ty Cui, PTA 02/05/19 1346     Row Name 02/05/19 1408             Coping    Observed Emotional State  calm;cooperative  -SD      Verbalized Emotional State  acceptance  -SD      Recorded by [SD] Priscilla Cerna OT 02/05/19 1534      Row Name 02/05/19 1408             Plan of Care Review    Plan of Care Reviewed With  patient  -SD      Recorded by [SD] Priscilla Cerna OT 02/05/19 1534      Row Name 02/05/19 1408             Outcome Summary/Treatment Plan (OT)    Daily Summary of Progress (OT)  progress toward functional goals is gradual  -SD      Anticipated Discharge Disposition (OT)  inpatient rehabilitation facility  -SD      Recorded by [SD] Priscilla Cerna OT 02/05/19 1534      Row Name 02/05/19 1119             Outcome Summary/Treatment Plan (PT)    Daily Summary of Progress (PT)  progress towards functional goals is fair  -RM      Plan for Continued Treatment (PT)  cont per poc.   -RM      Recorded by [RM] Ty Cui, [facility] 02/05/19 1346        User Key  (r) = Recorded By, (t) = Taken By, (c) = Cosigned By    Initials Name Effective Dates Discipline    RM Ty Cui, PTA 03/07/18 -  PT    SD Priscilla Cerna OT 03/07/18 -  OT        Rash 02/01/19 upper thoracic spine other (see comments) (Active)   Distribution localized 2/5/2019 12:00 PM   Configuration/Shape round 2/5/2019 12:00 PM   Borders irregular 2/5/2019 12:00 PM   Characteristics dry 2/5/2019 12:00 PM   Color pink 2/5/2019 12:00 PM     Rehab Goal Summary     Row Name 02/05/19 1408             Transfer Goal 1 (OT)    Progress/Outcome (Transfer Goal 1, OT)  goal ongoing  -SD         Dressing Goal 1 (OT)    Progress/Outcome  (Dressing Goal 1, OT)  goal ongoing  -SD         Toileting Goal 1 (OT)    Progress/Outcome (Toileting Goal 1, OT)  goal ongoing  -SD         Strength Goal 1 (OT)    Progress/Outcome (Strength Goal 1, OT)  continuing progress toward goal  -SD         Functional Mobility Goal 1 (OT)    Progress/Outcome (Functional Mobility Goal 1, OT)  goal ongoing  -SD        User Key  (r) = Recorded By, (t) = Taken By, (c) = Cosigned By    Initials Name Provider Type Discipline    SD Priscilla Cerna OT Occupational Therapist OT        Occupational Therapy Education     Title: PT OT SLP Therapies (In Progress)     Topic: Occupational Therapy (In Progress)     Point: ADL training (Done)     Description: Instruct learner(s) on proper safety adaptation and remediation techniques during self care or transfers.   Instruct in proper use of assistive devices.    Learning Progress Summary           Patient Acceptance, E,TB, VU by SD at 2/5/2019  3:36 PM    Comment:  Benefit of activity to improve functional performance and endurance.    Acceptance, E,TB, VU by SD at 2/4/2019 12:31 PM    Comment:  Benefit of OT; OT POC                               User Key     Initials Effective Dates Name Provider Type Discipline    SD 03/07/18 -  Priscilla Cerna OT Occupational Therapist OT                OT Recommendation and Plan  Outcome Summary/Treatment Plan (OT)  Daily Summary of Progress (OT): progress toward functional goals is gradual  Anticipated Discharge Disposition (OT): inpatient rehabilitation facility  Planned Therapy Interventions (OT Eval): activity tolerance training, adaptive equipment training, BADL retraining, patient/caregiver education/training, strengthening exercise, transfer/mobility retraining  Therapy Frequency (OT Eval): 3 times/wk(5 times if indicated)  Daily Summary of Progress (OT): progress toward functional goals is gradual  Plan of Care Review  Plan of Care Reviewed With: patient  Plan of Care Reviewed With:  patient  Outcome Summary: OT tx completed. Patient completed UB AROM, fatigued quickly. Continue OT POC  Outcome Measures     Row Name 02/05/19 1408 02/04/19 0940 02/04/19 0937       How much help from another person do you currently need...    Turning from your back to your side while in flat bed without using bedrails?  --  --  3  -JR    Moving from lying on back to sitting on the side of a flat bed without bedrails?  --  --  3  -JR    Moving to and from a bed to a chair (including a wheelchair)?  --  --  3  -JR    Standing up from a chair using your arms (e.g., wheelchair, bedside chair)?  --  --  3  -JR    Climbing 3-5 steps with a railing?  --  --  3  -JR    To walk in hospital room?  --  --  3  -JR    AM-PAC 6 Clicks Score  --  --  18  -JR       How much help from another is currently needed...    Putting on and taking off regular lower body clothing?  3  -SD  3  -SD  --    Bathing (including washing, rinsing, and drying)  3  -SD  3  -SD  --    Toileting (which includes using toilet bed pan or urinal)  3  -SD  3  -SD  --    Putting on and taking off regular upper body clothing  3  -SD  3  -SD  --    Taking care of personal grooming (such as brushing teeth)  4  -SD  4  -SD  --    Eating meals  4  -SD  4  -SD  --    Score  20  -SD  20  -SD  --       Functional Assessment    Outcome Measure Options  AM-PAC 6 Clicks Daily Activity (OT)  -SD  AM-PAC 6 Clicks Daily Activity (OT)  -SD  AM-PAC 6 Clicks Basic Mobility (PT)  -JR      User Key  (r) = Recorded By, (t) = Taken By, (c) = Cosigned By    Initials Name Provider Type     Jolene Garcia, PT Physical Therapist    Priscilla Mitchell, OT Occupational Therapist           Time Calculation:   Time Calculation- OT     Row Name 02/05/19 1536             Time Calculation- OT    OT Start Time  1408  -SD      Total Timed Code Minutes- OT  10 minute(s)  -SD      OT Received On  02/05/19  -SD      OT Goal Re-Cert Due Date  02/14/19  -SD         Timed Charges    78970 - OT  Therapeutic Exercise Minutes  10  -SD        User Key  (r) = Recorded By, (t) = Taken By, (c) = Cosigned By    Initials Name Provider Type    Priscilla Mitchell OT Occupational Therapist           Therapy Suggested Charges     Code   Minutes Charges    21907 (CPT®) Hc Ot Neuromusc Re Education Ea 15 Min      04281 (CPT®) Hc Ot Ther Proc Ea 15 Min 10 1    58670 (CPT®) Hc Ot Therapeutic Act Ea 15 Min      15698 (CPT®) Hc Ot Manual Therapy Ea 15 Min      85572 (CPT®) Hc Ot Iontophoresis Ea 15 Min      79428 (CPT®) Hc Ot Elec Stim Ea-Per 15 Min      61677 (CPT®) Hc Ot Ultrasound Ea 15 Min      49900 (CPT®) Hc Ot Self Care/Mgmt/Train Ea 15 Min      Total  10 1        Therapy Charges for Today     Code Description Service Date Service Provider Modifiers Qty    60684977885 HC OT EVAL LOW COMPLEXITY 4 2/4/2019 Priscilla Cerna OT GO 1    46072406724 HC OT THER PROC EA 15 MIN 2/5/2019 Priscilla Cerna OT GO 1               Priscilla Cerna OT  2/5/2019

## 2019-02-05 NOTE — SIGNIFICANT NOTE
02/05/19 1141   Rehab Treatment   Discipline occupational therapist   Reason Treatment Not Performed patient/family declined treatment

## 2019-02-05 NOTE — THERAPY TREATMENT NOTE
Acute Care - Physical Therapy Treatment Note   Ankit     Patient Name: Jose M Green  : 1931  MRN: [id number]  Today's Date: 2019  Onset of Illness/Injury or Date of Surgery: 19  Date of Referral to PT: 19  Referring Physician: Dr. Luna    Admit Date: 2019    Visit Dx:    ICD-10-CM ICD-9-CM   1. Acute congestive heart failure, unspecified heart failure type (CMS/HCC) I50.9 428.0   2. NSTEMI (non-ST elevated myocardial infarction) (CMS/HCC) I21.4 410.70   3. Atrial fibrillation with RVR (CMS/HCC) I48.91 427.31   4. Impaired functional mobility, balance, gait, and endurance Z74.09 V49.89   5. Impaired mobility and ADLs Z74.09 799.89     Patient Active Problem List   Diagnosis   • Chronic lymphocytic leukemia (CMS/HCC)   • Essential hypertension   • Acquired hypothyroidism   • Right knee pain   • Paroxysmal atrial fibrillation (CMS/HCC)   • Coronary artery disease involving native coronary artery of native heart without angina pectoris   • Acute cystitis with hematuria   • Hematuria   • Interstitial cystitis   • Acute congestive heart failure (CMS/HCC)   • Atrial fibrillation with RVR (CMS/HCC)   • NSTEMI (non-ST elevated myocardial infarction) (CMS/[facility])       Therapy Treatment    Rehabilitation Treatment Summary     Row Name 19 1119             Treatment Time/Intention    Discipline  physical therapy assistant  -RM      Document Type  therapy note (daily note)  -RM      Subjective Information  complains of;nausea/vomiting;fatigue  -RM      Mode of Treatment  physical therapy  -RM      Care Plan Review  care plan/treatment goals reviewed  -RM      Patient Effort  adequate  -RM      Existing Precautions/Restrictions  fall  -RM      Recorded by [RM] Ty Cui, PTA 19 1346      Row Name 19 1119             Bed Mobility Assessment/Treatment    Bed Mobility Assessment/Treatment  supine-sit  -RM      Supine-Sit [place] (Bed Mobility)  minimum assist (75%  patient effort)  -RM      Bed Mobility, Safety Issues  decreased use of arms for pushing/pulling;decreased use of legs for bridging/pushing  -RM      Assistive Device (Bed Mobility)  bed rails;head of bed elevated  -RM      Recorded by [] Ty Cui, hospitals 02/05/19 1346      Row Name 02/05/19 1119             Sit-Stand Transfer    Sit-Stand [place] (Transfers)  minimum assist (75% patient effort)  -RM      Assistive Device (Sit-Stand Transfers)  walker, front-wheeled  -RM      Recorded by [] Ty Cui, hospitals 02/05/19 1346      Row Name 02/05/19 1119             Stand-Sit Transfer    Stand-Sit [place] (Transfers)  minimum assist (75% patient effort)  -RM      Assistive Device (Stand-Sit Transfers)  walker, front-wheeled  -RM      Recorded by [] Ty Cui, hospitals 02/05/19 1346      Row Name 02/05/19 1119             Gait/Stairs Assessment/Training    [place] Level (Gait)  contact guard;minimum assist (75% patient effort);verbal cues  -RM      Assistive Device (Gait)  walker, front-wheeled  -RM      Distance in Feet (Gait)  12  -RM      Pattern (Gait)  step-to;step-through  -RM      Deviations/Abnormal Patterns (Gait)  base of support, wide;gait speed decreased;stride length decreased  -RM      Recorded by [] Ty Cui, hospitals 02/05/19 1346      Row Name 02/05/19 1119             Positioning and Restraints    Pre-Treatment Position  in bed  -RM      Post Treatment Position  chair  -RM      In Chair  reclined;call light within reach;exit alarm on;encouraged to call for assist;notified nsg  -RM      Recorded by [] Ty Cui, hospitals 02/05/19 1346      Row Name 02/05/19 1119             Pain Scale: Numbers Pre/Post-Treatment    Pain Scale: Numbers, Pretreatment  0/10 - no pain  -RM      Pain Scale: Numbers, Post-Treatment  0/10 - no pain  -RM      Recorded by [] Ty Cui, hospitals 02/05/19 1346      Row Name 02/05/19 1119             Outcome Summary/Treatment Plan  (PT)    Daily Summary of Progress (PT)  progress towards functional goals is fair  -      Plan for Continued Treatment (PT)  cont per poc.   -RM      Recorded by [] Ty Cui, PTA 02/05/19 1346        User Key  (r) = Recorded By, (t) = Taken By, (c) = Cosigned By    Initials Name Effective Dates Discipline     Ty Cui, PTA 03/07/18 -  PT          Rash 02/01/19 upper thoracic spine other (see comments) (Active)   Distribution localized 2/5/2019  8:00 AM   Configuration/Shape round 2/5/2019  8:00 AM   Borders irregular 2/5/2019  8:00 AM   Characteristics dry 2/5/2019  8:00 AM   Color pink 2/5/2019  8:00 AM           Physical Therapy Education     Title: PT OT SLP Therapies (In Progress)     Topic: Physical Therapy (In Progress)     Point: Mobility training (Done)     Learning Progress Summary           Patient Acceptance, E,TB,D, VU,NR by  at 2/5/2019  1:46 PM    Acceptance, E, VU by  at 2/4/2019  7:51 PM    Comment:  Role of PT                               User Key     Initials Effective Dates Name Provider Type Discipline     04/03/18 -  Jolene Garcia, PT Physical Therapist PT     03/07/18 -  Ty Cui, [facility] Physical Therapy Assistant PT                PT Recommendation and Plan     Outcome Summary/Treatment Plan (PT)  Daily Summary of Progress (PT): progress towards functional goals is fair  Plan for Continued Treatment (PT): cont per poc.   Plan of Care Reviewed With: patient  Progress: improving  Outcome Summary: Pt ambulated x12' with rw and cga-min a. Pt was able to tolerate increased activity this treatment.  See flowsheet for details.   Outcome Measures     Row Name 02/04/19 0940 02/04/19 0937          How much help from another person do you currently need...    Turning from your back to your side while in flat bed without using bedrails?  --  3  -JR     Moving from lying on back to sitting on the side of a flat bed without bedrails?  --  3  -JR     Moving to and from a  bed to a chair (including a wheelchair)?  --  3  -JR     Standing up from a chair using your arms (e.g., wheelchair, bedside chair)?  --  3  -JR     Climbing 3-5 steps with a railing?  --  3  -JR     To walk in hospital room?  --  3  -JR     AM-PAC 6 Clicks Score  --  18  -JR        How much help from another is currently needed...    Putting on and taking off regular lower body clothing?  3  -SD  --     Bathing (including washing, rinsing, and drying)  3  -SD  --     Toileting (which includes using toilet bed pan or urinal)  3  -SD  --     Putting on and taking off regular upper body clothing  3  -SD  --     Taking care of personal grooming (such as brushing teeth)  4  -SD  --     Eating meals  4  -SD  --     Score  20  -SD  --        Functional Assessment    Outcome Measure Options  AM-PAC 6 Clicks Daily Activity (OT)  -SD  AM-[facility] 6 Clicks Basic Mobility (PT)  -       User Key  (r) = Recorded By, (t) = Taken By, (c) = Cosigned By    Initials Name Provider Type    Jolene Antunez, PT Physical Therapist    Priscilla Mitchell, OT Occupational Therapist         Time Calculation:   PT Charges     Row Name 02/05/19 1358             Time Calculation    Start Time  1119  -RM      Stop Time  1134  -RM      Time Calculation (min)  15 min  -RM      PT Received On  02/05/19  -RM      PT Goal Re-Cert Due Date  02/14/19  -RM         Time Calculation- PT    Total Timed Code Minutes- PT  15 minute(s)  -RM         Timed Charges    35938 - PT Therapeutic Activity Minutes  15  -RM        User Key  (r) = Recorded By, (t) = Taken By, (c) = Cosigned By    Initials Name Provider Type    Ty Barlow, PTA Physical Therapy Assistant        Therapy Suggested Charges     Code   Minutes Charges    99446 (CPT®) Hc Pt Neuromusc Re Education Ea 15 Min      25991 (CPT®) Hc Pt Ther Proc Ea 15 Min      40756 (CPT®) Hc Gait Training Ea 15 Min      96662 (CPT®) Hc Pt Therapeutic Act Ea 15 Min 15 1    26706 (CPT®) Hc Pt Manual Therapy Ea  15 Min      09913 (CPT®) Hc Pt Iontophoresis Ea 15 Min      10155 (CPT®) Hc Pt Elec Stim Ea-Per 15 Min      97547 (CPT®) Hc Pt Ultrasound Ea 15 Min      32027 (CPT®) Hc Pt Self Care/Mgmt/Train Ea 15 Min      54152 (CPT®) Hc Pt Prosthetic (S) Train Initial Encounter, Each 15 Min      44421 (CPT®) Hc Pt Orthotic(S)/Prosthetic(S) Encounter, Each 15 Min      68226 (CPT®) Hc Orthotic(S) Mgmt/Train Initial Encounter, Each 15min      Total  15 1        Therapy Charges for Today     Code Description Service Date Service Provider Modifiers Qty    72897749467 HC PT THERAPEUTIC ACT EA 15 MIN 2/5/2019 Ty Cui, PTA GP 1          PT G-Codes  Outcome Measure Options: AM-PAC 6 Clicks Daily Activity (OT)  AM-PAC 6 Clicks Score: 18  Score: 20    Ty Cui, VIKY  2/5/2019

## 2019-02-05 NOTE — THERAPY EVALUATION
Acute Care - Physical Therapy Initial Evaluation  [facility]     Patient Name: Jose M Green  : 1931  MRN: [id number]  Today's Date: 2019   Onset of Illness/Injury or Date of Surgery: 19  Date of Referral to PT: 19  Referring Physician: Dr. Luna      Admit Date: 2019    Visit Dx:     ICD-10-CM ICD-9-CM   1. Acute congestive heart failure, unspecified heart failure type (CMS/HCC) I50.9 428.0   2. NSTEMI (non-ST elevated myocardial infarction) (CMS/[facility]) I21.4 410.70   3. Atrial fibrillation with RVR (CMS/[facility]) I48.91 427.31   4. Impaired functional mobility, balance, gait, and endurance Z74.09 V49.89   5. Impaired mobility and ADLs Z74.09 799.89     Patient Active Problem List   Diagnosis   • Chronic lymphocytic leukemia (CMS/[facility])   • Essential hypertension   • Acquired hypothyroidism   • Right knee pain   • Paroxysmal atrial fibrillation (CMS/[facility])   • Coronary artery disease involving native coronary artery of native heart without angina pectoris   • Acute cystitis with hematuria   • Hematuria   • Interstitial cystitis   • Acute congestive heart failure (CMS/HCC)   • Atrial fibrillation with RVR (CMS/[facility])   • NSTEMI (non-ST elevated myocardial infarction) (CMS/[facility])     Past Medical History:   Diagnosis Date   • Chronic lymphocytic leukemia (CMS/HCC)    • CLL (chronic lymphocytic leukemia) (CMS/[facility])    • DVT (deep venous thrombosis) (CMS/[facility])    • Inguinal hernia    • Kidney infection      Past Surgical History:   Procedure Laterality Date   • CARDIOVERSION     •  SECTION     • CYSTOSCOPY BLADDER BIOPSY N/A 2017    Procedure: CYSTOSCOPY BLADDER BIOPSY;  Surgeon: Obed Avalos MD;  Location: [facility];  Service:    • CYSTOSCOPY CYSTOGRAM N/A 2017    Procedure: CYSTOSCOPY CYSTOGRAM, EVACUATION OF BLADDER CLOTS;  Surgeon: Obed Avalos MD;  Location: [facility];  Service:    • HYSTERECTOMY     • OTHER SURGICAL HISTORY      Both Arm Plates   • VARICOSE VEIN SURGERY           PT ASSESSMENT (last 12 hours)      Physical Therapy Evaluation     Row Name 02/04/19 0937          PT Evaluation Time/Intention    Subjective Information  no complaints  -JR     Document Type  evaluation  -JR     Mode of Treatment  physical therapy  -JR     Patient Effort  good  -JR     Symptoms Noted During/After Treatment  significant change in vital signs  -JR     Comment  HR ranges between 131 and 75 and she feels SOA and needs to sit after 5 feet of gait  -JR     Row Name 02/04/19 0937          General Information    Patient Profile Reviewed?  yes  -JR     Onset of Illness/Injury or Date of Surgery  02/01/19  -JR     Referring Physician  Skip Luna MD  -JR     Patient Observations  alert;agree to therapy;cooperative  -JR     Patient/Family Observations  No family present at time of evaluation  -JR     General Observations of Patient  Reclined in chair with 2 LPm of oxygen per nasal cannula   -JR     Prior Level of Function  independent:;all household mobility  -JR     Equipment Currently Used at Home  walker, rolling;cane, straight;shower chair;commode, bedside;wheelchair  -JR     Pertinent History of Current Functional Problem  A-fib with RVR, SOA,   -JR     Existing Precautions/Restrictions  fall;oxygen therapy device and L/min  -JR     Risks Reviewed  patient:;increased discomfort  -JR     Benefits Reviewed  patient:;increase balance;increase strength;increase independence;improve function  -JR     Row Name 02/04/19 0937          Relationship/Environment    Primary Source of Support/Comfort  child(dave)  -JR     Lives With  alone  -JR     Concerns About Impact on Relationships  dtr lives next door  -JR     Row Name 02/04/19 0937          Resource/Environmental Concerns    Current Living Arrangements  home/apartment/condo  -JR     Row Name 02/04/19 0937          Cognitive Assessment/Intervention- PT/OT    Orientation Status (Cognition)  oriented x 4  -JR     Follows Commands (Cognition)  verbal  cues/prompting required;increased processing time needed  -     Safety Deficit (Cognitive)  safety precautions awareness  -     Row Name 02/04/19 0937          Safety Issues, Functional Mobility    Safety Issues Affecting Function (Mobility)  safety precaution awareness  -     Impairments Affecting Function (Mobility)  balance;coordination;shortness of breath  -[facility] Name 02/04/19 0937          Bed Mobility Assessment/Treatment    Comment (Bed Mobility)  UIC when PT arrived  -[facility] Name 02/04/19 0937          Transfer Assessment/Treatment    Transfer Assessment/Treatment  sit-stand transfer;stand-sit transfer  -     Sit-Stand [place] (Transfers)  minimum assist (75% patient effort)  -     Stand-Sit [place] (Transfers)  minimum assist (75% patient effort)  -[facility] Name 02/04/19 0937          Sit-Stand Transfer    Assistive Device (Sit-Stand Transfers)  walker, front-wheeled  -[facility] Name 02/04/19 0937          Stand-Sit Transfer    Assistive Device (Stand-Sit Transfers)  walker, front-wheeled  -[facility] Name 02/04/19 0937          Gait/Stairs Assessment/Training    Gait/Stairs Assessment/Training  gait/ambulation independence;gait/ambulation assistive device  -     [place] Level (Gait)  minimum assist (75% patient effort)  -     Assistive Device (Gait)  walker, front-wheeled  -     Distance in Feet (Gait)  5 x 2  -     Pattern (Gait)  step-to  -JR     Deviations/Abnormal Patterns (Gait)  gait speed decreased;juan carlos decreased;festinating/shuffling  -[facility] Name 02/04/19 0937          General ROM    GENERAL ROM COMMENTS  BLE WFL  -[facility] Name 02/04/19 0937          MMT (Manual Muscle Testing)    General MMT Comments  BLE = 3/5  -[facility] Name 02/04/19 0937          Pain Assessment    Additional Documentation  Pain Scale: Numbers Pre/Post-Treatment (Group)  -[facility] Name 02/04/19 0937          Pain Scale: Numbers Pre/Post-Treatment    Pain Scale:  Numbers, Pretreatment  0/10 - no pain  -     Pain Scale: Numbers, Post-Treatment  0/10 - no pain  -     Row Name 02/04/19 0937          Coping    Observed Emotional State  calm;cooperative  -     Verbalized Emotional State  acceptance  -     Row Name 02/04/19 0937          Plan of Care Review    Plan of Care Reviewed With  patient  -JR     Row Name 02/04/19 0937          Physical Therapy Clinical Impression    Date of Referral to PT  02/03/19  -JR     PT Diagnosis (PT Clinical Impression)  weakness, poor balance, gait abn  -     Criteria for Skilled Interventions Met (PT Clinical Impression)  yes;treatment indicated  -JR     Pathology/Pathophysiology Noted (Describe Specifically for Each System)  cardiovascular;musculoskeletal;neuromuscular  -     Impairments Found (describe specific impairments)  gait, locomotion, and balance;aerobic capacity/endurance;joint integrity and mobility  -     Rehab Potential (PT Clinical Summary)  good, to achieve stated therapy goals  -     Care Plan Review (PT)  evaluation/treatment results reviewed;care plan/treatment goals reviewed;risks/benefits reviewed;current/potential barriers reviewed;patient/other agree to care plan  -     Row Name 02/04/19 0937          Vital Signs    Pretreatment Heart Rate (beats/min)  130  -JR     Intratreatment Heart Rate (beats/min)  78  -JR     Posttreatment Heart Rate (beats/min)  103  -JR     Row Name 02/04/19 0937          Physical Therapy Goals    Bed Mobility Goal Selection (PT)  bed mobility, PT goal 1  -JR     Transfer Goal Selection (PT)  transfer, PT goal 1  -JR     Gait Training Goal Selection (PT)  gait training, PT goal 1  -     Row Name 02/04/19 0937          Bed Mobility Goal 1 (PT)    Activity/Assistive Device (Bed Mobility Goal 1, PT)  bed mobility activities, all  -JR     [place] Level/Cues Needed (Bed Mobility Goal 1, PT)  standby assist  -     Time Frame (Bed Mobility Goal 1, PT)  2 weeks  -JR      Progress/Outcomes (Bed Mobility Goal 1, PT)  goal ongoing  -JR     Row Name 02/04/19 0937          Transfer Goal 1 (PT)    Activity/Assistive Device (Transfer Goal 1, PT)  sit-to-stand/stand-to-sit  -JR     [place] Level/Cues Needed (Transfer Goal 1, PT)  contact guard assist  -JR     Time Frame (Transfer Goal 1, PT)  2 weeks  -JR     Progress/Outcome (Transfer Goal 1, PT)  goal ongoing  -JR     Row Name 02/04/19 0937          Gait Training Goal 1 (PT)    Activity/Assistive Device (Gait Training Goal 1, PT)  gait (walking locomotion);assistive device use;increase endurance/gait distance  -JR     [place] Level (Gait Training Goal 1, PT)  contact guard assist  -JR     Distance (Gait Goal 1, PT)  100  -JR     Time Frame (Gait Training Goal 1, PT)  2 weeks  -JR     Progress/Outcome (Gait Training Goal 1, PT)  goal ongoing  -JR     Row Name 02/04/19 0937          Patient Education Goal (PT)    Activity (Patient Education Goal, PT)  Perform HEP x 15 reps  -JR     [place]/Cues/Accuracy (Memory Goal 2, PT)  demonstrates adequately  -JR     Time Frame (Patient Education Goal, PT)  2 weeks  -JR     Progress/Outcome (Patient Education Goal, PT)  goal ongoing  -JR     Row Name 02/04/19 0937          Positioning and Restraints    Pre-Treatment Position  sitting in chair/recliner  -JR     Post Treatment Position  chair  -JR     In Chair  reclined;call light within reach;encouraged to call for assist;exit alarm on  -JR       User Key  (r) = Recorded By, (t) = Taken By, (c) = Cosigned By    Initials Name Provider Type    Jolene Antunez PT Physical Therapist        Physical Therapy Education     Title: PT OT SLP Therapies (In Progress)     Topic: Physical Therapy (In Progress)     Point: Mobility training (Done)     Learning Progress Summary           Patient Acceptance, E, VU by  at 2/4/2019  7:51 PM    Comment:  Role of PT                               User Key     Initials Effective Dates Name Provider Type  Discipline    JR 04/03/18 -  Jolene Garcia, PT Physical Therapist PT              PT Recommendation and Plan  Anticipated Discharge Disposition (PT): home with home health  Planned Therapy Interventions (PT Eval): balance training, strengthening, bed mobility training, transfer training, gait training, home exercise program, patient/family education  Therapy Frequency (PT Clinical Impression): daily  Outcome Summary/Treatment Plan (PT)  Anticipated Discharge Disposition (PT): home with home health  Plan of Care Reviewed With: patient  Outcome Summary: Patient participates well skilled PT evaluation and demonstrates decreased strength, balance, transfers and gait.  She is expected to benefit from additional PT while hospitalized.    Outcome Measures     Row Name 02/04/19 0940 02/04/19 0937          How much help from another person do you currently need...    Turning from your back to your side while in flat bed without using bedrails?  --  3  -JR     Moving from lying on back to sitting on the side of a flat bed without bedrails?  --  3  -JR     Moving to and from a bed to a chair (including a wheelchair)?  --  3  -JR     Standing up from a chair using your arms (e.g., wheelchair, bedside chair)?  --  3  -JR     Climbing 3-5 steps with a railing?  --  3  -JR     To walk in hospital room?  --  3  -JR     AM-PAC 6 Clicks Score  --  18  -JR        How much help from another is currently needed...    Putting on and taking off regular lower body clothing?  3  -SD  --     Bathing (including washing, rinsing, and drying)  3  -SD  --     Toileting (which includes using toilet bed pan or urinal)  3  -SD  --     Putting on and taking off regular upper body clothing  3  -SD  --     Taking care of personal grooming (such as brushing teeth)  4  -SD  --     Eating meals  4  -SD  --     Score  20  -SD  --        Functional Assessment    Outcome Measure Options  AM-PAC 6 Clicks Daily Activity (OT)  -SD  AM-PAC 6 Clicks Basic  Mobility (PT)  -       User Key  (r) = Recorded By, (t) = Taken By, (c) = Cosigned By    Initials Name Provider Type    Jolene Antunez, PT Physical Therapist    Priscilla Mitchell, OT Occupational Therapist         Time Calculation:   PT Charges     Row Name 02/04/19 1954             Time Calculation    Start Time  0937 -      PT Received On  02/04/19  -      PT Goal Re-Cert Due Date  02/14/19  -        User Key  (r) = Recorded By, (t) = Taken By, (c) = Cosigned By    Initials Name Provider Type    Jolene Antunez, PT Physical Therapist        Therapy Suggested Charges     Code   Minutes Charges    None           Therapy Charges for Today     Code Description Service Date Service Provider Modifiers Qty    50271418500 HC PT EVAL MOD COMPLEXITY 4 2/4/2019 Jolene Garcia, PT GP 1          PT G-Codes  Outcome Measure Options: AM-PAC 6 Clicks Daily Activity (OT)  AM-PAC 6 Clicks Score: 18  Score: 20      Jolene Garcia, PT  2/4/2019

## 2019-02-05 NOTE — PLAN OF CARE
Problem: Patient Care Overview  Goal: Plan of Care Review  Outcome: Ongoing (interventions implemented as appropriate)   02/05/19 4161   Coping/Psychosocial   Plan of Care Reviewed With patient   Plan of Care Review   Progress improving   OTHER   Outcome Summary OT tx completed. Patient completed UB AROM, fatigued quickly. Continue OT POC

## 2019-02-05 NOTE — PLAN OF CARE
Problem: Patient Care Overview  Goal: Plan of Care Review  Outcome: Ongoing (interventions implemented as appropriate)   02/05/19 0331   Coping/Psychosocial   Plan of Care Reviewed With patient   Plan of Care Review   Progress no change   OTHER   Outcome Summary rested well, VSS, HR improved, pt slept sound after sleeping pill given per pt request.      Goal: Individualization and Mutuality  Outcome: Ongoing (interventions implemented as appropriate)    Goal: Discharge Needs Assessment  Outcome: Ongoing (interventions implemented as appropriate)      Problem: Skin Injury Risk (Adult)  Goal: Skin Health and Integrity  Outcome: Ongoing (interventions implemented as appropriate)

## 2019-02-06 NOTE — PROGRESS NOTES
Case Management Discharge Note         Destination - Selection Complete      Service Provider Request Status Selected Services Address Phone Number Fax Number    [facility] Selected Skilled Nursing [address] [address] [phone]      Durable Medical Equipment      No service has been selected for the patient.      Dialysis/Infusion      No service has been selected for the patient.      Home Medical Care      No service has been selected for the patient.      Community Resources      No service has been selected for the patient.        Other: Other(Private vehicle)    Final Discharge Disposition Code: 03 - skilled nursing facility (SNF)

## 2019-02-06 NOTE — DISCHARGE SUMMARY
[facility]      Name:  Jose M Green   Age:  87 y.o.  Sex:  female  :  1931  MRN:  [id number]   Visit Number:  [id number]  Primary Care Physician:  Speedy Bravo MD  Date of Discharge:  2019  Admission Date:  2019      Discharge Diagnosis:           Atrial fibrillation with RVR (CMS/[facility])    Essential hypertension    Acquired hypothyroidism    Acute congestive heart failure (CMS/[facility])    NSTEMI (non-ST elevated myocardial infarction) (CMS/[facility])  Urinary tract infection    Presenting Problem/History of Present Illness:    Atrial fibrillation with RVR (CMS/[facility]) [I48.91]  Acute congestive heart failure, unspecified heart failure type (CMS/[facility]) [I50.9]     Consults:     Consults     Date and Time Order Name Status Description    2019 0379 Inpatient Urology Consult      2019 1146 Inpatient Cardiology Consult            Procedures Performed:             History of presenting illness:    Shortness of breath, cough for about 5 days    Hospital Course:    87-year-old female with coronary artery disease history of atrial fibrillation rate controlled coming in with increasing cough and shortness of breath. Noted in the emergency room to have a rapid ventricular rate of around 1:30 beats a minute she was given diltiazem IV bolus followed by a drip IV Lasix for congestive heart failure. Cardiology consult was obtained she was subsequently transferred to the floor.  Elevated cardiac enzymes noted she was managed medically for this. As per her wishes and with her advanced age and did not want aggressive interventional measures. Additional medication in the form of oral diltiazem was added on for rate control. She received L requests for 2 days however with history of recurring gross hematuria and significant risk of falls this was discontinued after discussing with patient and her son-in-law  Urine analysis with evidence of infection in the past has had  enterococcus sensitive to amoxicillin and this was introduced again. A urology consult has been obtained.  Also noted during the stay in the hospital were intravascular volume depletion and hyperkalemia these were co corrected with IV fluids and oral hydration. Kayexalate was given on 1 occasion. Repeat lab work showed correction of her hyperkalemia    Incidental finding of left lower rib fractures were noted. On further questioning the patient these may have occurred about 2 weeks ago when she had sustained a fall. Also noted was a right middle lobe infiltrate. This is being observed clinically may be related to her fluid overload. Bilateral pleural effusions have also been noted.    She is being transferred to a rehabilitation facility for gait training and improving her overall conditioning.      Review of Systems:  Review of Systems   Constitutional: Negative.  Negative for activity change, appetite change, fatigue and fever.   HENT: Negative for congestion, ear discharge, ear pain and trouble swallowing.    Eyes: Negative for photophobia and visual disturbance.   Respiratory: Positive for cough and shortness of breath.    Cardiovascular: Negative for chest pain and palpitations.   Gastrointestinal: Negative for abdominal distention, abdominal pain, constipation, diarrhea, nausea and vomiting.   Endocrine: Negative.    Genitourinary: Positive for hematuria. Negative for dysuria and urgency.   Musculoskeletal: Positive for arthralgias and back pain. Negative for joint swelling and myalgias.   Skin: Negative for color change and rash.   Allergic/Immunologic: Negative.    Neurological: Negative for dizziness, weakness, light-headedness and headaches.   Hematological: Negative for adenopathy. Does not bruise/bleed easily.   Psychiatric/Behavioral: Negative for agitation, confusion and dysphoric mood. The patient is not nervous/anxious.         Vital Signs:    Temp:  [97.1 °F (36.2 °C)-98.1 °F (36.7 °C)] 98 °F (36.7  °C)  Heart Rate:  [76-92] 92  Resp:  [18-20] 18  BP: ()/(55-87) 126/87    Physical Exam:  Physical Exam    Pertinent Lab Results:     Lab Results (last 72 hours)     Procedure Component Value Units Date/Time    Basic Metabolic Panel [[id number]]  (Abnormal) Collected:  02/06/19 0657    Specimen:  Blood Updated:  02/06/19 0754     Glucose 83 mg/dL      BUN 39 mg/dL      Creatinine 1.10 mg/dL      Sodium 139 mmol/L      Potassium 4.1 mmol/L      Chloride 104 mmol/L      CO2 25.0 mmol/L      Calcium 8.0 mg/dL      eGFR Non African Amer 47 mL/min/1.73      BUN/Creatinine Ratio 35.5     Anion Gap 14.1 mmol/L     Narrative:       The MDRD GFR formula is only valid for adults with stable renal function between ages 18 and 70.    Hemoglobin & Hematocrit, Blood [[id number]]  (Abnormal) Collected:  02/06/19 0657    Specimen:  Blood Updated:  02/06/19 0712     Hemoglobin 11.0 g/dL      Hematocrit 37.6 %     Urine Culture - Urine, Urine, Catheter In/Out [[id number]]  (Abnormal) Collected:  02/05/19 1848    Specimen:  Urine, Catheter In/Out Updated:  02/06/19 0516     Urine Culture >100,000 CFU/mL Gram Negative Bacilli     Comment: Identification and CRISTY to follow.         Urinalysis, Microscopic Only - Urine, Catheter In/Out [[id number]]  (Abnormal) Collected:  02/05/19 1848    Specimen:  Urine, Catheter In/Out Updated:  02/05/19 1928     RBC, UA Too Numerous to Count /HPF      WBC, UA Too Numerous to Count /HPF      Bacteria, UA 4+ /HPF      Squamous Epithelial Cells, UA       Unable to determine due to loaded field     /HPF     Hyaline Casts, UA       Unable to determine due to loaded field     /LPF     WBC Clumps, UA Moderate/2+ /HPF      Methodology Manual Light Microscopy    Urinalysis With Culture If Indicated - Urine, Catheter In/Out [[id number]]  (Abnormal) Collected:  02/05/19 1848    Specimen:  Urine, Catheter In/Out Updated:  02/05/19 1902     Color, UA Tila     Appearance, UA Cloudy     pH, UA 5.5     Specific  Gravity, UA 1.020     Glucose, UA Negative     Ketones, UA 15 mg/dL (1+)     Bilirubin, UA Moderate (2+)     Blood, UA Large (3+)     Protein, UA >=300 mg/dL (3+)     Leuk Esterase, UA Large (3+)     Nitrite, UA Positive     Urobilinogen, UA 2.0 E.U./dL    Hemoglobin & Hematocrit, Blood [[id number]]  (Abnormal) Collected:  02/05/19 1551    Specimen:  Blood Updated:  02/05/19 1612     Hemoglobin 10.8 g/dL      Hematocrit 36.0 %     Basic Metabolic Panel [[id number]]  (Abnormal) Collected:  02/05/19 0547    Specimen:  Blood Updated:  02/05/19 0712     Glucose 111 mg/dL      BUN 43 mg/dL      Creatinine 1.10 mg/dL      Sodium 139 mmol/L      Potassium 5.9 mmol/L      Chloride 104 mmol/L      CO2 27.0 mmol/L      Calcium 8.8 mg/dL      eGFR Non African Amer 47 mL/min/1.73      BUN/Creatinine Ratio 39.1     Anion Gap 13.9 mmol/L     Narrative:       The MDRD GFR formula is only valid for adults with stable renal function between ages 18 and 70.    Basic Metabolic Panel [[id number]]  (Abnormal) Collected:  02/04/19 0644    Specimen:  Blood Updated:  02/04/19 0720     Glucose 127 mg/dL      BUN 34 mg/dL      Creatinine 1.00 mg/dL      Sodium 140 mmol/L      Potassium 5.2 mmol/L      Chloride 103 mmol/L      CO2 30.0 mmol/L      Calcium 8.8 mg/dL      eGFR Non African Amer 52 mL/min/1.73      BUN/Creatinine Ratio 34.0     Anion Gap 12.2 mmol/L     Narrative:       The MDRD GFR formula is only valid for adults with stable renal function between ages 18 and 70.    Hemoglobin A1c [[id number]]  (Normal) Collected:  02/01/19 1824    Specimen:  Blood Updated:  02/04/19 0231     Hemoglobin A1C 5.4 %     Narrative:       Expected HgbA1C results:     3% to 6% HgbA1C      HgbA1C                 Estimated Average Glucose     5%                              97 mg/dl   6%                             126 mg/dl   7%                             154 mg/dl   8%                             183 mg/dl   9%                             212  mg/dl  >10%                           >240 mg/dl      Potassium [[id number]]  (Abnormal) Collected:  02/03/19 1350    Specimen:  Blood Updated:  02/03/19 1408     Potassium 5.3 mmol/L           Pertinent Radiology Results:    Imaging Results (all)     Procedure Component Value Units Date/Time    XR Ribs 2 View Left [[id number]] Collected:  02/03/19 1858     Updated:  02/03/19 1859    Narrative:       FINAL REPORT    CLINICAL HISTORY:  . left chest muskuloskeletal pain    FINDINGS:  There are age-indeterminate nondisplaced left eighth and ninth  rib fractures with small left pleural effusion.  There is  nodular consolidation right midlung field.  Is consolidative  right lower lobe airspace disease with moderate to large right  pleural effusion.  The heart is enlarged.  Aorta is calcified.  Impression: Age-indeterminate left eighth and ninth rib  fractures  right airspace disease and bilateral effusions.  Cannot exclude right midlung field mass.  Recommend appropriate  follow-up      Impression:       Authenticated by John Harper MD on 02/03/2019 06:58:41 PM    NM Lung Ventilation Perfusion [[id number]] Collected:  02/01/19 1512     Updated:  02/01/19 1515    Narrative:       PROCEDURE: NM LUNG VENTILATION PERFUSION-     HISTORY: rule out PE; I50.9-Heart failure, unspecified; I21.4-Non-ST  elevation (NSTEMI) myocardial infarction; I48.91-Unspecified atrial  fibrillation     PROCEDURE: The patient was injected with 5.2 mCi of Tc 99m MAA. The  patient was unable to do the ventilation portion of the exam. Images  over the lungs were obtained in multiple projections.     COMPARISON: Chest x-ray dated Today.     FINDINGS: There is homogeneous perfusion of both lungs.           Impression:       Low probability for pulmonary embolus.     This report was finalized on 2/1/2019 3:13 PM by Lori Daugherty M.D..    XR Chest 2 View [[id number]] Collected:  02/01/19 1008     Updated:  02/01/19 1013    Narrative:        PROCEDURE: XR CHEST 2 VW-        HISTORY: dyspnea     COMPARISON: July 15, 2018.     FINDINGS: The heart is enlarged. The mediastinum is unremarkable. There  is interstitial pulmonary edema with right greater than left pleural  effusions as well as elevation of the right hemidiaphragm. There is no  pneumothorax. There are no acute osseous abnormalities.           Impression:       Interstitial pulmonary edema with bilateral pleural  effusions.                 This report was finalized on 2/1/2019 10:09 AM by Lori Daugherty M.D..          Condition on Discharge:      Has had good oral intake today. She has been up in the chair for the last 2 days intermittently. Doing well on incentive spirometry. Denies further hematuria denies abdominal pain or urinary frequency    Code status during the hospital stay:    Code Status and Medical Interventions:   Ordered at: 02/01/19 4423     Level Of Support Discussed With:    Patient     Code Status:    No CPR     Medical Interventions (Level of Support Prior to Arrest):    Comfort Measures       Discharge Disposition:    Skilled Nursing Facility (DC - External)    Discharge Medication:       Discharge Medications      New Medications      Instructions Start Date   amiodarone 200 MG tablet  Commonly known as:  PACERONE   200 mg, Oral, Every 24 Hours Scheduled      amoxicillin 500 MG capsule  Commonly known as:  AMOXIL   500 mg, Oral, Every 12 Hours Scheduled      diltiaZEM  MG 24 hr capsule  Commonly known as:  CARDIZEM CD   180 mg, Oral, Every 24 Hours Scheduled         Continue These Medications      Instructions Start Date   aspirin 81 MG EC tablet   81 mg, Oral, Daily      atorvastatin 40 MG tablet  Commonly known as:  LIPITOR   40 mg, Oral, Daily      docusate sodium 100 MG capsule  Commonly known as:  DOK   100 mg, Oral, 2 Times Daily PRN      levothyroxine 100 MCG tablet  Commonly known as:  SYNTHROID, LEVOTHROID   100 mcg, Oral, Daily, 200001      metoprolol  tartrate 50 MG tablet  Commonly known as:  LOPRESSOR   50 mg, Oral, 2 Times Daily      Mirabegron ER 25 MG tablet sustained-release 24 hour 24 hr tablet  Commonly known as:  MYRBETRIQ   25 mg, Oral, Daily      mirtazapine 30 MG tablet  Commonly known as:  REMERON   TAKE 1 TABLET BY MOUTH IN THE EVENING       omeprazole 20 MG capsule  Commonly known as:  priLOSEC   20 mg, Oral, Daily             Discharge Diet: Regular as tolerated        Activity at Discharge: As per physical therapy and occupational therapy      discussed with patient and son in law in detail.    Follow-up Appointments:     Contact information for follow-up providers     Speedy Bravo MD .    Specialty:  Internal Medicine  Contact information:  [address] [address] [phone]                   Contact information for after-discharge care     Destination     [facility] .    Service:  Skilled Nursing  Contact information:  [address] [facility] [address] [phone]                                 Test Results Pending at Discharge:     Order Current Status    Urine Culture - Urine, Urine, Catheter In/Out Preliminary result             Speedy Bravo MD  02/06/19  9:12 AM    Time: Discharge 35 min

## 2019-02-06 NOTE — PROGRESS NOTES
Continued Stay Note   Ankit     Patient Name: Jose M Green  MRN: [id number]  Today's Date: 2/6/2019    Admit Date: 2/1/2019    Discharge Plan     Row Name 02/06/19 1214       Plan    Plan  [facility]    Patient/Family in Agreement with Plan  yes    Plan Comments  Received call back from Urmila with [facility].  Okay to accept pt today.  Report number is [phone].  Fax is [phone].  Son in  law, Ant, will provide transportation.  Ant will be at the hospital around 1:30.  DARWIN Kraus updated.          Discharge Codes    No documentation.       Expected Discharge Date and Time     Expected Discharge Date Expected Discharge Time    Feb 6, 2019             Shannon Hanson

## 2019-02-06 NOTE — CONSULTS
Consults    Patient Care Team:  Speedy Bravo MD as PCP - General  Speedy Bravo MD as PCP - Family Medicine  Speedy Bravo MD as PCP - Claims Attributed  Speedy Bravo MD    Chief complaint: Hematuria    Subjective     History of Present Illness  This 89-year-old lady presented to the emergency room complaining of shortness of air and was found to be in atrial fibrillation with a rapid ventricular response.  During her hospitalization she was started on Eliquis found to have a urinary tract infection and had an episode of gross hematuria.  The Eliquis has been stopped currently but there will be a decision soon about restarting it.    This patient's had repeated episodes of gross hematuria at least managed by me since 2017.  At that time a CT scan revealed a markedly abnormal bladder and therefore she was taken to the operating room where multiple biopsies were obtained and returned inflammatory changes only.  The conclusion on that episode was hemorrhagic cystitis.  She had another episode in 2018 and underwent cystoscopy in the office which is basically normal and urine for cytology returned no malignancies.  In 2017 the CT scan revealed bilateral hydronephrosis and it was thought secondary to the markedly thickened bladder wall from hemorrhagic cystitis is no stones were seen.  Review of Systems   Review of Systems   Constitutional: Positive for fatigue. Negative for activity change, appetite change and fever.   HENT: Negative for congestion, ear discharge, ear pain and trouble swallowing.    Eyes: Negative for photophobia and visual disturbance.   Respiratory: Positive for cough and shortness of breath.    Cardiovascular: Negative for chest pain and palpitations.   Gastrointestinal: Negative for abdominal distention, abdominal pain, constipation, diarrhea, nausea and vomiting.   Endocrine: Negative.    Genitourinary: Positive for difficulty urinating. Negative for dysuria, hematuria and urgency.    Musculoskeletal: Positive for arthralgias and gait problem. Negative for back pain, joint swelling and myalgias.   Skin: Negative for color change and rash.   Allergic/Immunologic: Negative.    Neurological: Negative for dizziness, weakness, light-headedness and headaches.   Hematological: Negative for adenopathy. Does not bruise/bleed easily.   Psychiatric/Behavioral: Positive for sleep disturbance. Negative for agitation, confusion and dysphoric mood. The patient is nervous/anxious.           Past Medical History:   Diagnosis Date   • Chronic lymphocytic leukemia (CMS/HCC)    • CLL (chronic lymphocytic leukemia) (CMS/HCC)    • DVT (deep venous thrombosis) (CMS/HCC)    • Inguinal hernia    • Kidney infection    ,   Past Surgical History:   Procedure Laterality Date   • CARDIOVERSION     •  SECTION     • CYSTOSCOPY BLADDER BIOPSY N/A 2017    Procedure: CYSTOSCOPY BLADDER BIOPSY;  Surgeon: Obed Avalos MD;  [facility];  Service:    • CYSTOSCOPY CYSTOGRAM N/A 2017    Procedure: CYSTOSCOPY CYSTOGRAM, EVACUATION OF BLADDER CLOTS;  Surgeon: Obed Avalos MD;  Location: [facility];  Service:    • HYSTERECTOMY     • OTHER SURGICAL HISTORY      Both Arm Plates   • VARICOSE VEIN SURGERY     ,   Family History   Problem Relation Age of Onset   • Arthritis Mother    • Heart disease Mother    • Hypertension Mother    • Kidney disease Mother    • Heart disease Father    • Hypertension Father    • Arthritis Sister    • COPD Sister    • Heart disease Sister    ,   Social History     Tobacco Use   • Smoking status: Never Smoker   • Smokeless tobacco: Never Used   Substance Use Topics   • Alcohol use: No   • Drug use: No   ,   Medications Prior to Admission   Medication Sig Dispense Refill Last Dose   • aspirin 81 MG EC tablet Take 1 tablet by mouth Daily.   2019 at Unknown time   • atorvastatin (LIPITOR) 40 MG tablet Take 1 tablet by mouth Daily. 90 tablet 3 2019 at 1800   • docusate  sodium (DOK) 100 MG capsule Take 1 capsule by mouth 2 (Two) Times a Day As Needed for Constipation. 90 capsule 3 1/31/2019 at 0900   • levothyroxine (SYNTHROID, LEVOTHROID) 100 MCG tablet Take 1 tablet by mouth Daily. 200001 90 tablet 2 1/31/2019 at 0900   • metoprolol tartrate (LOPRESSOR) 50 MG tablet Take 1 tablet by mouth 2 (Two) Times a Day. 180 tablet 3 1/31/2019 at 0900   • Mirabegron ER (MYRBETRIQ) 25 MG tablet sustained-release 24 hour 24 hr tablet Take 1 tablet by mouth Daily. 30 tablet 11 1/31/2019 at 0900   • mirtazapine (REMERON) 30 MG tablet TAKE 1 TABLET BY MOUTH IN THE EVENING  30 tablet 3 1/31/2019 at 2000   • omeprazole (priLOSEC) 20 MG capsule Take 1 capsule by mouth Daily. 30 capsule 11 1/31/2019 at 0900   , Scheduled Meds:    amiodarone 200 mg Oral Q24H   amiodarone 200 mg Oral Once   amoxicillin 500 mg Oral Q12H   atorvastatin 40 mg Oral Nightly   diltiaZEM  mg Oral Q24H   levothyroxine 100 mcg Oral Q AM   metoprolol tartrate 50 mg Oral Q12H   mirtazapine 30 mg Oral Nightly   oxybutynin XL 5 mg Oral Daily   pantoprazole 40 mg Oral QAM AC   , Continuous Infusions:   , PRN Meds:  •  acetaminophen  •  ALPRAZolam  •  docusate sodium  •  HYDROcodone-acetaminophen  •  ondansetron and Allergies:  Codeine; Contrast dye; and Aspirin    Objective      Vital Signs  Temp:  [97.1 °F (36.2 °C)-98.1 °F (36.7 °C)] 97.9 °F (36.6 °C)  Heart Rate:  [] 102  Resp:  [18-20] 18  BP: ()/(55-87) 113/68    Physical Exam  Physical Exam   Constitutional: She is oriented to person, place, and time. She appears well-developed and well-nourished. No distress.   HENT:   Nose: Nose normal.   Mouth/Throat: Oropharynx is clear and moist.   Eyes: Conjunctivae and EOM are normal. No scleral icterus.   Neck: No tracheal deviation present. No thyromegaly present.   Cardiovascular: Normal rate. Exam reveals no friction rub.   No murmur heard.  Pulmonary/Chest: No respiratory distress. She has no wheezes. She has  rales.   Abdominal: Soft. She exhibits no distension and no mass. There is no tenderness. There is no guarding.   Musculoskeletal: Normal range of motion. She exhibits edema and deformity.   Lymphadenopathy:     She has no cervical adenopathy.   Neurological: She is alert and oriented to person, place, and time. She has normal reflexes. No cranial nerve deficit. Coordination normal.   Skin: Skin is warm and dry. No rash noted. No erythema.   Psychiatric: She has a normal mood and affect. Her behavior is normal. Judgment and thought content normal.          Results Review:    I reviewed the patient's new clinical results.  I reviewed the patient's new imaging results and agree with the interpretation.  I reviewed the patient's other test results and agree with the interpretation        Assessment/Plan       Atrial fibrillation with RVR (CMS/[facility])    Essential hypertension    Acquired hypothyroidism    Acute congestive heart failure (CMS/[facility])    NSTEMI (non-ST elevated myocardial infarction) (CMS/[facility])      Assessment & Plan  #1 gross hematuria: Currently the hematuria has resolved but will probably return if she on Eliquis.  The patient's urine culture is growing organisms and she has been started on amoxicillin by Dr. Bravo.  I feel it is safe for her to be transferred back to the nursing home and would recommend follow-up in my office after she completes the course of antibiotics administration of adequate granulation therapy were to be considered in light of the probable recurring hematuria.      I discussed the patients findings and my recommendations with patient, nursing staff and primary care team    Obed Avalos MD  02/06/19  12:13 PM    Time: Critical care 70 min

## 2019-02-06 NOTE — PLAN OF CARE
Problem: Patient Care Overview  Goal: Plan of Care Review  Outcome: Ongoing (interventions implemented as appropriate)   02/06/19 0408   Coping/Psychosocial   Plan of Care Reviewed With patient   Plan of Care Review   Progress no change   OTHER   Outcome Summary NO acute events overnight. no falls or complaints of pain. Continue to monitor.      Goal: Discharge Needs Assessment  Outcome: Ongoing (interventions implemented as appropriate)      Problem: Fall Risk (Adult)  Goal: Absence of Fall  Outcome: Ongoing (interventions implemented as appropriate)      Problem: Cardiac: Heart Failure (Adult)  Goal: Signs and Symptoms of Listed Potential Problems Will be Absent, Minimized or Managed (Cardiac: Heart Failure)  Outcome: Ongoing (interventions implemented as appropriate)      Problem: Hospitalized Acutely Ill Older (Adult)  Goal: Signs and Symptoms of Listed Potential Problems Will be Absent, Minimized or Managed (Hospitalized Acutely Ill Older)  Outcome: Ongoing (interventions implemented as appropriate)      Problem: Skin Injury Risk (Adult)  Goal: Skin Health and Integrity  Outcome: Ongoing (interventions implemented as appropriate)

## 2019-02-18 PROBLEM — I48.91 ATRIAL FIBRILLATION WITH RVR (HCC): Status: RESOLVED | Noted: 2019-01-01 | Resolved: 2019-01-01

## 2019-02-18 NOTE — ED NOTES
At this time, house supervisor was contacted for bed assignment.      Krys Sevilla  02/18/19 1532

## 2019-02-18 NOTE — H&P
[facility] HOSPITALIST   HISTORY AND PHYSICAL      Name:  Jose M Green   Age:  87 y.o.  Sex:  female  :  1931  MRN:  [id number]   Visit Number:  [id number]  Admission Date:  2019  Date Of Service:  19  Primary Care Physician:  Speedy Bravo MD; Dr River at nursing facility    Chief Complaint: shortness of breath and agitation        History Of Presenting Illness:  The patient is a 87 yr old lady transferred to ER from [facility] for shortness of breath. She had just been discharged from this facility after treatment of CHF exacerbation ,post diuresis, transferred to nursing facility by Dr Bravo. At Facility, she is seeing Dr River, so hospitalist service was requested for admission. The patient was seen in the ER. She is demented, acutely agitated and daughter reports she is worse than usual, trying to pull off tubes and lines and trying to get up out of bed in uncoordinated fashion. History is provided from daughter Toya, who reports her other family is unavailable today, son is having surgery today and unavailable.     The patient had been intermittently getting lasix at facility, once as documented on . She has had progressive confusion, shortness of breath over past several days, not wanting to perform physical therapy as ordered. She has difficulty providing history. She denies chest pain, abdominal pain, nausea or vomiting. She is requesting coffee, apparently she normally does that. She doesn't seem to have any cough, but does report fatigue, shortness of breath not improving yet despite being given Lasix in ER, with NC oxygen placed. She has oxygen seen as she pulls it off, of 86% RA. She is improved to 95% on 2L.       On recent hospital admit, she was diuresed and then volume depleted as well with Afib with rvr, history of diltiazem drip. She was stabilized and discharged back to nursing facility but was not placed on routine diuretic.   She  was also recent treated with amoxil and bactrim for previous UTI.       Review Of Systems: Difficult to obtain in acutely agitated patient with underlying dementia     General ROS: Patient denies any fevers, chills or loss of consciousness. Complains of generalized weakness.   Psychological ROS: Denies any hallucinations and delusions.  Ophthalmic ROS: Denies any diplopia or transient loss of vision.  ENT ROS: Denies sore throat, nasal congestion or ear pain.   Allergy and Immunology ROS: Denies rash or itching.  Hematological and Lymphatic ROS: Denies neck swelling or easy bleeding.  Endocrine ROS: Denies any recent unintentional weight gain or loss.  Breast ROS: Denies any pain or swelling.  Respiratory ROS: Denies cough but does have shortness of breath.   Cardiovascular ROS: Denies chest pain or palpitations. No history of exertional chest pain.  Gastrointestinal ROS: Denies nausea and vomiting. Denies any abdominal pain. No diarrhea.  Genito-Urinary ROS: Denies dysuria or hematuria.  Musculoskeletal ROS: Denies chronic back pain. No muscle pain. No calf pain.   Neurological ROS: Denies any focal weakness. No loss of consciousness. Denies any numbness. Denies neck pain.   Dermatological ROS: Denies any redness or pruritis.       Past Medical History:    Past Medical History:   Diagnosis Date   • Anemia    • Atherosclerotic heart disease    • Bladder disorder    • CHF (congestive heart failure) (CMS/HCC)    • Chronic lymphocytic leukemia (CMS/HCC)    • Disease of thyroid gland    • DVT (deep venous thrombosis) (CMS/HCC)    • GERD (gastroesophageal reflux disease)    • Hyperlipidemia    • Hypertension    • Inguinal hernia    • Kidney infection    • Multiple rib fractures    • Muscle weakness    • Myocardial infarction (CMS/HCC)    • Other recurrent depressive disorders (CMS/HCC)    • Other reduced mobility    • Paroxysmal atrial fibrillation (CMS/HCC)    • Urinary tract infection        Past Surgical  history:    Past Surgical History:   Procedure Laterality Date   • CARDIOVERSION     •  SECTION     • CYSTOSCOPY BLADDER BIOPSY N/A 2017    Procedure: CYSTOSCOPY BLADDER BIOPSY;  Surgeon: Obed Avalos MD;  Location: [facility];  Service:    • CYSTOSCOPY CYSTOGRAM N/A 2017    Procedure: CYSTOSCOPY CYSTOGRAM, EVACUATION OF BLADDER CLOTS;  Surgeon: Obed Avalos MD;  Location: [facility];  Service:    • HYSTERECTOMY     • OTHER SURGICAL HISTORY      Both Arm Plates   • VARICOSE VEIN SURGERY         Social History: denies tobacco, alcohol, drug use. Living at the [facility]  Pediatric History   Patient Guardian Status   • Not on file     Other Topics Concern   • Not on file   Social History Narrative   • Not on file       Family History:    Family History   Problem Relation Age of Onset   • Arthritis Mother    • Heart disease Mother    • Hypertension Mother    • Kidney disease Mother    • Heart disease Father    • Hypertension Father    • Arthritis Sister    • COPD Sister    • Heart disease Sister        Allergies:      Codeine; Contrast dye; Aspirin; and Keflex [cephalexin]    Home Medications:    Prior to Admission Medications     Prescriptions Last Dose Informant Patient Reported? Taking?    amiodarone (PACERONE) 200 MG tablet   No Yes    Take 1 tablet by mouth Daily.    aspirin 81 MG EC tablet   No No    Take 1 tablet by mouth Daily.    atorvastatin (LIPITOR) 40 MG tablet   No Yes    Take 1 tablet by mouth Daily.    diltiaZEM CD (CARDIZEM CD) 180 MG 24 hr capsule   No Yes    Take 1 capsule by mouth Daily.    docusate sodium (DOK) 100 MG capsule   No Yes    Take 1 capsule by mouth 2 (Two) Times a Day As Needed for Constipation.    levothyroxine (SYNTHROID, LEVOTHROID) 100 MCG tablet   No Yes    Take 1 tablet by mouth Daily.     metoprolol tartrate (LOPRESSOR) 50 MG tablet   No Yes    Take 1 tablet by mouth 2 (Two) Times a Day.    Mirabegron ER (MYRBETRIQ) 25 MG tablet  sustained-release 24 hour 24 hr tablet   No Yes    Take 1 tablet by mouth Daily.    mirtazapine (REMERON) 30 MG tablet   No Yes    TAKE 1 TABLET BY MOUTH IN THE EVENING     omeprazole (priLOSEC) 20 MG capsule   No Yes    Take 1 capsule by mouth Daily.    sulfamethoxazole-trimethoprim (BACTRIM DS,SEPTRA DS) 800-160 MG per tablet   Yes Yes    Take 1 tablet by mouth 2 (Two) Times a Day.             ED Medications:    Medications   sodium chloride 0.9 % flush 10 mL (not administered)   insulin regular (humuLIN R,novoLIN R) injection 10 Units (not administered)   albuterol (PROVENTIL) nebulizer solution 0.083% 2.5 mg/3mL (not administered)   ALPRAZolam (XANAX) tablet 0.25 mg (not administered)   diphenhydrAMINE (BENADRYL) capsule 25 mg (25 mg Oral Given 2/18/19 1338)   furosemide (LASIX) injection 40 mg (40 mg Intravenous Given 2/18/19 1343)       Vital Signs:    Temp:  [97.8 °F (36.6 °C)] 97.8 °F (36.6 °C)  Heart Rate:  [] 82  Resp:  [16-22] 22  BP: (129-165)/() 129/90    No intake or output data in the 24 hours ending 02/18/19 1607        02/18/19  1150   Weight: 54.4 kg (120 lb)       Body mass index is 19.97 kg/m².    Physical Exam:      General Appearance:    Elderly lady, agitated, sitting up in bed. Alert and cooperative only with increased reassurance, no acute distress, dioriented   Head:    Atraumatic and normocephalic, without obvious defect.   Eyes:            PERRLA, conjunctivae and sclerae normal, no icterus. No pallor. Extraocular movements are within normal limits.   Ears:    Ears appear intact with no abnormalities noted.   Throat:   No oral lesions, no thrush, oral mucosa moist.   Neck:   Supple, trachea midline, no thyromegaly, no carotid bruit.   Back:     +kyphosis without midline tenderness   Lungs:     Chest shape is normal. Breath sounds heard bilaterally equally.  No crackles or wheezing. No Pleural rub or bronchial breathing.      Heart:    Normal S1 and S2, no murmur, no gallop    Abdomen:     Normal bowel sounds, no masses, no organomegaly. Soft        non-tender, non-distended, no guarding, no rebound                Tenderness   Extremities:   Moves all extremities well, 2+= edema, no cyanosis, no             clubbing   Pulses:   Pulses palpable and equal bilaterally   Skin:   No bleeding, bruising or rash; chronic venous stasis skin changes on her lower legs   Lymph nodes:   No palpable adenopathy   Neurologic:   No tremor, increased movement of her arms with agitation, moves legs equally, gross sensation intact, tries to follow commands but confused       EKG:    Afib 92 with increased motion    Labs:    Lab Results (last 24 hours)     Procedure Component Value Units Date/Time    Potassium [[id number]] Updated:  02/18/19 1505    Specimen:  Blood     [place] Draw [[id number]] Collected:  02/18/19 1248    Specimen:  Blood Updated:  02/18/19 1500    Narrative:       The following orders were created for panel order [place] Draw.  Procedure                               Abnormality         Status                     ---------                               -----------         ------                     Light Blue Top[[id number]]                                   Final result               Lavender Top[[id number]]                                     Final result               Gold Top - SST[[id number]]                                   Final result               Green Top (No Gel)[[id number]]                               Final result                 Please view results for these tests on the individual orders.    Light Blue Top [[id number]] Collected:  02/18/19 1352    Specimen:  Blood Updated:  02/18/19 1500     Extra Tube hold for add-on     Comment: Auto resulted       Lavender Top [[id number]] Collected:  02/18/19 1248    Specimen:  Blood Updated:  02/18/19 1400     Extra Tube hold for add-on     Comment: Auto resulted       Gold Top - SST [[id number]] Collected:  02/18/19 1248    Specimen:   Blood Updated:  02/18/19 1400     Extra Tube Hold for add-ons.     Comment: Auto resulted.       Green Top (No Gel) [[id number]] Collected:  02/18/19 1248    Specimen:  Blood Updated:  02/18/19 1400     Extra Tube Hold for add-ons.     Comment: Auto resulted.       Comprehensive Metabolic Panel [[id number]]  (Abnormal) Collected:  02/18/19 1248    Specimen:  Blood Updated:  02/18/19 1329     Glucose 104 mg/dL      BUN 36 mg/dL      Creatinine 1.20 mg/dL      Sodium 137 mmol/L      Potassium 6.4 mmol/L      Comment: Specimen hemolyzed.  Results may be affected.        Chloride 100 mmol/L      CO2 30.0 mmol/L      Calcium 9.2 mg/dL      Total Protein 7.5 g/dL      Albumin 4.20 g/dL      ALT (SGPT) 48 U/L      Comment: Specimen hemolyzed.  Results may be affected.        AST (SGOT) 57 U/L      Comment: Specimen hemolyzed.  Results may be affected.        Alkaline Phosphatase 234 U/L      Comment: Specimen hemolyzed. Results may be affected.        Total Bilirubin 0.7 mg/dL      eGFR Non African Amer 42 mL/min/1.73      Globulin 3.3 gm/dL      A/G Ratio 1.3 g/dL      BUN/Creatinine Ratio 30.0     Anion Gap 13.4 mmol/L     Narrative:       The MDRD GFR formula is only valid for adults with stable renal function between ages 18 and 70.    BNP [[id number]]  (Abnormal) Collected:  02/18/19 1248    Specimen:  Blood Updated:  02/18/19 1328     proBNP 12,900.0 pg/mL     Troponin [[id number]]  (Normal) Collected:  02/18/19 1248    Specimen:  Blood Updated:  02/18/19 1328     Troponin I <0.012 ng/mL     Narrative:       Normal Patient Upper Reference Limit (URL) (99th Percentile)=0.03 ng/mL   Non-AMI Illness Reference Limit=0.03-0.11 ng/mL   AMI Confirmation=0.12 ng/mL and above    CBC & Differential [[id number]] Collected:  02/18/19 1248    Specimen:  Blood Updated:  02/18/19 1323    Narrative:       The following orders were created for panel order CBC & Differential.  Procedure                               Abnormality          Status                     ---------                               -----------         ------                     Scan Slide[[id number]]                                       Final result               CBC Auto Differential[[id number]]        Abnormal            Final result                 Please view results for these tests on the individual orders.    Scan Slide [[id number]] Collected:  02/18/19 1248    Specimen:  Blood Updated:  02/18/19 1323     Anisocytosis Slight/1+     Hypochromia Slight/1+     Ovalocytes Slight/1+     Poikilocytes Slight/1+     WBC Morphology Normal     Platelet Estimate Adequate    CBC Auto Differential [[id number]]  (Abnormal) Collected:  02/18/19 1248    Specimen:  Blood Updated:  02/18/19 1322     WBC 9.76 10*3/mm3      RBC 4.80 10*6/mm3      Hemoglobin 13.8 g/dL      Hematocrit 46.1 %      MCV 96.0 fL      MCH 28.8 pg      MCHC 29.9 g/dL      RDW 18.6 %      RDW-SD 64.8 fl      MPV 11.2 fL      Platelets 167 10*3/mm3      Neutrophil % 69.8 %      Lymphocyte % 22.4 %      Monocyte % 6.7 %      Eosinophil % 0.3 %      Basophil % 0.4 %      Immature Grans % 0.4 %      Neutrophils, Absolute 6.81 10*3/mm3      Lymphocytes, Absolute 2.19 10*3/mm3      Monocytes, Absolute 0.65 10*3/mm3      Eosinophils, Absolute 0.03 10*3/mm3      Basophils, Absolute 0.04 10*3/mm3      Immature Grans, Absolute 0.04 10*3/mm3      nRBC 0.0 /100 WBC           Radiology:    Imaging Results (last 72 hours)     Procedure Component Value Units Date/Time    XR Chest 2 View [[id number]] Collected:  02/18/19 1315     Updated:  02/18/19 1320    Narrative:       PROCEDURE: XR CHEST 2 VW-        HISTORY: [facility]A Triage Protocol     COMPARISON: February 3, 2019.     FINDINGS: The heart is normal in size. The mediastinum is unremarkable.  There is interstitial pulmonary edema. There are right greater than left  pleural effusions and basilar atelectasis. There is no pneumothorax.  There are no acute osseous abnormalities. Note is  made of a chronic  appearing compression deformity of the lower thoracic spine.       Impression:       Interstitial pulmonary edema with right greater than left  pleural effusions and bibasilar atelectasis.           This report was finalized on 2/18/2019 1:16 PM by Lori Daugherty M.D..          Assessment:  Acute Congestive heart failure with exacerbation,Diastolic; last 2d echo EF >55% 2017  Acute hypoxic respiratory failure, with acute pulmonary edema  Acute  Hyperkalemia, prior to admission  Acute encephalopathy, metabolic, related to hypoxia  Progressive chronic dementia with multiple recent episodes of decline  History of recent UTI, treated  History of recent recurrent hematuria, blood loss, anticoagulation contraindicated and refused by family  Chronic Afib    Plan:    Admit to telemetry to hospitalist service. Continue IV daily lasix. Continue her metoprolol and diltiazem oral along with amiodarone. Patient and family desire medication management only.   Provide oxygen as needed. Patient declining PT, OT right now as she is feeling to weak, so will hold off today. Dr Bravo requested hospitalist service care for patient since she is now seeing Dr River at nursing facility.   Resume other home medications. Titrate daily medications and continue to monitor daily labs. Monitor troponin, but treat medically and no invasive treatments are desired. Repeat potassium later to see if improved.    DNR and comfort care measures. Daughter at bedside and she wants her to have anxiety medication right now, so alprazolam given x 1. Daughter mentioned that if her mentation doesn't improve, she seems to be leaning more towards more comfort type care. Continue to monitor and treat accordingly. Palliative nurse consult for now. See how lasix and improved hypoxia effects her memory, but consider Hospice consultation if she doesn't improve.  Family declined anticoagulation with comfort care measures and history of  recurrent bleeding.   Medication risks and benefits were discussed in detail. Patient reported being satisfied with current treatment plan.    Jolene Martinez DO  02/18/19  4:07 PM

## 2019-02-18 NOTE — ED NOTES
Attempted IV access x3, unsuccessful. Jazmine RN @ BS to attempt.      Renita Hanson RN  02/18/19 9708

## 2019-02-18 NOTE — ED PROVIDER NOTES
Subjective   7-year-old female presents to the ED via EMS from a extended care facility for chief complaint of shortness of breath.  The patient was recently admitted to the hospital with A. alem with RVR and acute congestive heart failure.  Her care facility indicates that her shortness of breath has been worsening over the last few days and it has worsened with exertion and any type of ambulation.  They have also noted that the patient has had increasing anxiety over the last few days.  The patient has had a cough.  She admits to increased work of breathing.  She denies fever or chills.  No nausea vomiting diarrhea or abdominal pain.  Admits to generalized fatigue.  No other complaints at this time.            Review of Systems   Constitutional: Positive for fatigue.   Respiratory: Positive for cough and shortness of breath. Negative for chest tightness and wheezing.    Cardiovascular: Positive for leg swelling. Negative for chest pain.   All other systems reviewed and are negative.      Past Medical History:   Diagnosis Date   • Anemia    • Atherosclerotic heart disease    • Bladder disorder    • CHF (congestive heart failure) (CMS/HCC)    • Chronic lymphocytic leukemia (CMS/HCC)    • Disease of thyroid gland    • DVT (deep venous thrombosis) (CMS/HCC)    • GERD (gastroesophageal reflux disease)    • Hyperlipidemia    • Hypertension    • Inguinal hernia    • Kidney infection    • Multiple rib fractures    • Muscle weakness    • Myocardial infarction (CMS/HCC)    • Other recurrent depressive disorders (CMS/HCC)    • Other reduced mobility    • Paroxysmal atrial fibrillation (CMS/HCC)    • Urinary tract infection        Allergies   Allergen Reactions   • Codeine Nausea And Vomiting   • Contrast Dye Hives   • Aspirin Palpitations   • Keflex [Cephalexin] Unknown (See Comments)     Pt unable to recall reaction       Past Surgical History:   Procedure Laterality Date   • CARDIOVERSION     •  SECTION     •  CYSTOSCOPY BLADDER BIOPSY N/A 9/12/2017    Procedure: CYSTOSCOPY BLADDER BIOPSY;  Surgeon: Obed Avalos MD;  Location: [facility] OR;  Service:    • CYSTOSCOPY CYSTOGRAM N/A 9/12/2017    Procedure: CYSTOSCOPY CYSTOGRAM, EVACUATION OF BLADDER CLOTS;  Surgeon: Obed Avalos MD;  Location: [facility] OR;  Service:    • HYSTERECTOMY     • OTHER SURGICAL HISTORY      Both Arm Plates   • VARICOSE VEIN SURGERY         Family History   Problem Relation Age of Onset   • Arthritis Mother    • Heart disease Mother    • Hypertension Mother    • Kidney disease Mother    • Heart disease Father    • Hypertension Father    • Arthritis Sister    • COPD Sister    • Heart disease Sister        Social History     Socioeconomic History   • Marital status:      Spouse name: Not on file   • Number of children: Not on file   • Years of education: Not on file   • Highest education level: Not on file   Tobacco Use   • Smoking status: Never Smoker   • Smokeless tobacco: Never Used   Substance and Sexual Activity   • Alcohol use: No   • Drug use: No   • Sexual activity: Defer           Objective   Physical Exam   Constitutional: She is oriented to person, place, and time. She appears distressed.   Elderly-appearing mild respiratory distress   HENT:   Head: Normocephalic and atraumatic.   Nose: Nose normal.   Eyes: Conjunctivae and EOM are normal.   Cardiovascular: Normal rate and regular rhythm.   Pulmonary/Chest: She is in respiratory distress.   Course breath sounds bilaterally.  Accessory muscle use.  Conversational dyspnea.   Abdominal: Soft. She exhibits no distension. There is no tenderness. There is no guarding.   Musculoskeletal: She exhibits edema. She exhibits no deformity.   Pitting edema bilateral lower extremities.  Worse in feet bilaterally.   Neurological: She is alert and oriented to person, place, and time. No cranial nerve deficit.   Skin: She is not diaphoretic.   Nursing note and vitals reviewed.      Procedures            ED Course  ED Course as of Feb 18 1539   Mon Feb 18, 2019   1229 EKG interpreted by me.  Atrial fibrillation.  Rate of 92.  Right axis deviation.  Nonspecific ST segment changes.  Abnormal EKG  [CG]      ED Course User Index  [CG] Virgilio Norris DO        She presented with increased work of breathing and dyspnea.  Chest x-ray shows worsening right-sided pleural effusion.  Placed on oxygen secondary to hypoxia and Lasix was given laboratory results nonspecific except for an elevated BNP.  Patient also had some hyperkalemia with analysis.  We will attempt to redraw.  Discussed the case with Dr. Martinez who agrees to meet the patient to telemetry.          MDM      Final diagnoses:   Pleural effusion   Acute congestive heart failure, unspecified heart failure type (CMS/HCC)   Hypoxia   Atrial fibrillation, unspecified type (CMS/HCC)            Virgilio Norris DO  02/18/19 6688

## 2019-02-18 NOTE — PROGRESS NOTES
"Nursing Home Follow Up Note      Josesito River DO []   ESPERANZA Li [x]  [address] [place] [address]  Phone: [phone]  Fax: [phone] Mag Rangel MD []    Bebeto Nettles DO []   [address] [place] [address]  Phone: [phone]  Fax: [phone]     PATIENT NAME: Jose M Green                                                                          YOB: 1931           DATE OF SERVICE: 02/18/2019  FACILITY:  [][place]   [] [place]   [] [facility]   [x] [facility]   [] Other ______________________________________________________________________      CHIEF COMPLAINT:  Shortness of breath and increased heart rate      HISTORY OF PRESENT ILLNESS:     Patient with increased HR and complaints of increased SOA, since Friday 2/15. Per Physical Therapy, she had been doing very well with PT, but all the sudden got short of breath Friday and had to be helped back to bed. She has been unable to participate much since. Per staff and patient, she has been more SOA with any exertion. This morning patient states she feels more short of breath. She is experiencing tachypnea and tachycardia as well. Her 02 sats have been 87-88%, on 3L. Currently, her sats are 95-97%.  Patient has an extensive cardiac history and states she \" doesn't want to have another heart attack\". Patient is DNR, but still wishes to go to the hospital. She was admitted to the hospital a few weeks ago, for Afib with RVR. She was placed on a  Cardizem drip which caused her to convert back to a more normal rhythm. She was still in Afib, but her rate was controlled.  At the time, she refused external cardioversion. Her family was contacted by nursing staff and family would also like her to go to the hospital.       PAST MEDICAL & SURGICAL HISTORY:   Past Medical History:   Diagnosis Date   • Anemia    • Atherosclerotic heart disease    • Bladder disorder    • CHF (congestive heart failure) (CMS/HCC)  "   • Chronic lymphocytic leukemia (CMS/HCC)    • Disease of thyroid gland    • DVT (deep venous thrombosis) (CMS/HCC)    • GERD (gastroesophageal reflux disease)    • Hyperlipidemia    • Hypertension    • Inguinal hernia    • Kidney infection    • Multiple rib fractures    • Muscle weakness    • Myocardial infarction (CMS/HCC)    • Other recurrent depressive disorders (CMS/HCC)    • Other reduced mobility    • Paroxysmal atrial fibrillation (CMS/HCC)    • Urinary tract infection       Past Surgical History:   Procedure Laterality Date   • CARDIOVERSION     •  SECTION     • CYSTOSCOPY BLADDER BIOPSY N/A 2017    Procedure: CYSTOSCOPY BLADDER BIOPSY;  Surgeon: Obed Avalos MD;  Location: [facility];  Service:    • CYSTOSCOPY CYSTOGRAM N/A 2017    Procedure: CYSTOSCOPY CYSTOGRAM, EVACUATION OF BLADDER CLOTS;  Surgeon: Obed Avalos MD;  Location: [facility];  Service:    • HYSTERECTOMY     • OTHER SURGICAL HISTORY      Both Arm Plates   • VARICOSE VEIN SURGERY           MEDICATIONS:  I have reviewed and reconciled the patients medication list in the patients chart at the skilled nursing facility today.      ALLERGIES:  Allergies   Allergen Reactions   • Codeine Nausea And Vomiting   • Contrast Dye Hives   • Aspirin Palpitations   • Keflex [Cephalexin] Unknown (See Comments)     Pt unable to recall reaction         SOCIAL HISTORY:  Social History     Socioeconomic History   • Marital status:      Spouse name: Not on file   • Number of children: Not on file   • Years of education: Not on file   • Highest education level: Not on file   Social Needs   • Financial resource strain: Not on file   • Food insecurity - worry: Not on file   • Food insecurity - inability: Not on file   • Transportation needs - medical: Not on file   • Transportation needs - non-medical: Not on file   Occupational History   • Not on file   Tobacco Use   • Smoking status: Never Smoker   • Smokeless tobacco: Never Used    Substance and Sexual Activity   • Alcohol use: No   • Drug use: No   • Sexual activity: Defer   Other Topics Concern   • Not on file   Social History Narrative   • Not on file       FAMILY HISTORY:  Family History   Problem Relation Age of Onset   • Arthritis Mother    • Heart disease Mother    • Hypertension Mother    • Kidney disease Mother    • Heart disease Father    • Hypertension Father    • Arthritis Sister    • COPD Sister    • Heart disease Sister        REVIEW OF SYSTEMS:    Review of Systems   Constitutional: Negative for activity change, appetite change, chills, diaphoresis, fatigue, fever, unexpected weight gain and unexpected weight loss.   HENT: Negative for congestion, ear pain, mouth sores, nosebleeds, postnasal drip, rhinorrhea, sinus pressure, sneezing, sore throat and trouble swallowing.    Eyes: Negative for blurred vision, pain, discharge, redness, itching and visual disturbance.   Respiratory: Positive for shortness of breath. Negative for apnea, cough, choking, chest tightness, wheezing and stridor.    Cardiovascular: Positive for palpitations. Negative for chest pain and leg swelling.   Gastrointestinal: Negative for abdominal distention, abdominal pain, blood in stool, constipation, diarrhea, nausea, vomiting, GERD and indigestion.   Endocrine: Negative for polydipsia and polyuria.   Genitourinary: Negative for urinary incontinence, decreased urine volume, difficulty urinating, dysuria, flank pain, frequency, hematuria and urgency.   Musculoskeletal: Positive for arthralgias. Negative for back pain, gait problem, joint swelling and myalgias.   Skin: Negative for color change, dry skin, rash and skin lesions.   Allergic/Immunologic: Negative for environmental allergies.   Neurological: Positive for weakness and memory problem. Negative for dizziness, seizures, speech difficulty and confusion.   Psychiatric/Behavioral: Negative for behavioral problems, dysphoric mood, hallucinations, sleep  disturbance and depressed mood. The patient is not nervous/anxious.          PHYSICAL EXAMINATION:   VITAL SIGNS: BP: 120/70  HR:  FI02: 2 L NC RR: 20-26  Temp: 97.1 Wt: 120    Physical Exam   Constitutional: She appears well-developed and well-nourished.   HENT:   Head: Normocephalic.   Right Ear: External ear normal.   Left Ear: External ear normal.   Nose: Nose normal.   Eyes: Conjunctivae are normal.   Neck: Normal range of motion.   Cardiovascular: Normal heart sounds and intact distal pulses. An irregular rhythm present. Tachycardia present.   A fib   Pulmonary/Chest: Accessory muscle usage present. Tachypnea noted. She is in respiratory distress. She has decreased breath sounds. She has no wheezes. She has no rales.   Abdominal: Soft. Bowel sounds are normal. She exhibits no distension and no mass. There is no tenderness. No hernia.   Musculoskeletal: She exhibits no edema.   NROM all major joints   Neurological: She is alert.   Skin: Skin is warm and dry. No rash noted.   Psychiatric: She has a normal mood and affect. Her behavior is normal.   Nursing note and vitals reviewed.      RECORDS REVIEW:   I have reviewed and interpreted the following lab test results  obtained at the time of the visit today.     ASSESSMENT     Diagnoses and all orders for this visit:    Paroxysmal A-fib (CMS/HCC)    Acute systolic congestive heart failure (CMS/HCC)    Shortness of breath    Tachypnea        PLAN    Patient with increased HR and complaints of increased SOA, since Friday 2/15. Per Physical Therapy, she had been doing very well with PT, but all the sudden got short of breath Friday and had to be helped back to bed. She has been unable to participate much since. Per staff and patient, she has been more SOA with any exertion. This morning patient states she feels more short of breath. She is experiencing tachypnea and tachycardia as well. Her 02 sats have been 87-88%, on 3L. Currently, her sats are 95-97%.   "Patient has an extensive cardiac history and states she \" doesn't want to have another heart attack\". Patient is DNR, but still wishes to go to the hospital. She was admitted to the hospital a few weeks ago, for Afib with RVR. She was placed on a  Cardizem drip which caused her to convert back to a more normal rhythm. She was still in Afib, but her rate was controlled.  At the time, she refused external cardioversion. Her family was contacted by nursing staff and family would also like her to go to the hospital.  Patient sent to [facility] for further evaluation.      [x]  Discussed Patient in detail with nursing/staff, addressed all needs today.     [x]  Plan of Care Reviewed   []  PT/OT Reviewed   [x]  Order Changes  []  Discharge Plans Reviewed  []  Advance Directive on file with Nursing Home.   []  POA on file with Nursing Home.   [x]  Code Status listed: []  Full Code   [x]  DNR           Urmila Wallis, APRN.     "

## 2019-02-18 NOTE — ED NOTES
Chiqui from lab was unable to obtain blur top tube, she states that she will send another phlebotomist to attempt to obtain.     Renita Hanson, RN  02/18/19 9796

## 2019-02-19 NOTE — PROGRESS NOTES
Discharge Planning Assessment  [facility]     Patient Name: Jose M Green  MRN: [id number]  Today's Date: 2/19/2019    Admit Date: 2/18/2019    Discharge Needs Assessment    No documentation.       Discharge Plan     Row Name 02/19/19 1423       Plan    Plan Called and spoke to NASIM Cain for DCP.  Patient has been at the [facility] for 2 weeks for rehab.  Explained that the Palliative nurse saw patient and spoke to daughter Toya and advised that the patient is not center appropriate at this time and that patient would need to be LTC for hospice services.  Ant advises that he will contact the [facility] about LTC placement as he doesn't feel they can take care of patient when she is discharged from the [facility].  Prior to last admission and placement at the [facility], patient lived next door  to daughter and son in law Cain.  Pt was independnet ind ADLs.  She had a person from the waiver program that came to assist with housekeeping, cooking and bath assistance as needed.  Pt had [facility] in the past.  She uses a rolling walker to ambulate and has a cane, BSC, and shower seat. .Address, phone & PCP verified as correct.  CM will continue to follow.                  Patient/Family in Agreement with Plan  yes        Destination      No service coordination in this encounter.      Durable Medical Equipment      No service coordination in this encounter.      Dialysis/Infusion      No service coordination in this encounter.      Home Medical Care      No service coordination in this encounter.      Community Resources      No service coordination in this encounter.          Demographic Summary    No documentation.       Functional Status    No documentation.       Psychosocial    No documentation.       Abuse/Neglect    No documentation.       Legal    No documentation.       Substance Abuse    No documentation.       Patient Forms    No documentation.           Becka Harper

## 2019-02-19 NOTE — PLAN OF CARE
Problem: Cardiac: Heart Failure (Adult)  Goal: Signs and Symptoms of Listed Potential Problems Will be Absent, Minimized or Managed (Cardiac: Heart Failure)  Outcome: Ongoing (interventions implemented as appropriate)   02/19/19 1611   Goal/Outcome Evaluation   Problems Assessed (Heart Failure) all   Problems Present (Heart Failure) dysrhythmia/arrhythmia;functional decline/self-care deficit

## 2019-02-19 NOTE — PROGRESS NOTES
"Palliative Care Nurse consult received.  Pt admitted from [facility] with increasing SOA, confusion.  O2 sat on RA 86%.  Report of pt declining to participate with PT/OT at SNF.  Pt is currently No CPR, Comfort Measures.  Her POA is her son in law, Ant Francois, and daughter, Shanell Francois.  Document scanned into system.  Visited pt to find her awake and alert.  O2 per NC in place.  No resp distress observed.  Pt's daughter, Toya Ying, present.  She reported pt's POA's were not available to be here today.  Pt answered questions appropriately.  Informed them I was with the Palliative Care team.    At this time, Dr Martinez visited.  Dr Martinez informed pt she was improving.  Daughter inquired if pt was appropriate for the [facility].  Dr Martinez explained pt was not appropriate at this time in light of pt's symptoms improving.   There was discussion about hospice care at the facility.  I explained if pt was admitted under skilled care, Medicare would not pay for hospice care.  If she was admitted under long term care it would.  Pt's daughter was not sure.  I informed her I would check.  Dr Martinez informed pt and daughter of anticipated DC in 1-2 days.  I stayed to speak with daughter re: hospice care, the criteria, the services it provides and the criteria for Inpt Hospice.  I explained the [facility] was not a long term care facility.  We then discussed pt's DCP if she is skilled.  Daughter related that they had HH at one point but do not have anyone to stay 24/7 at pt's home.  I brought up possible Long Term Care.  Informed her pt has Medicaid which typically covers LTC.  Daughter stated she would speak with the family about plans.  I informed her I would call [facility] to confirm if pt is skilled level.   Pt's main complaint was that she \"was cold\".  Extra blanket applied.   PC will continue to follow.    Contacted Urmila at [facility] who confirmed pt was admitted under skilled care. She " reported if the family is interested in LTC they needed to contact Carla Piña at [facility] ([phone])    [phone]  Updated pt's daughter re: [facility].  Contact information provided.

## 2019-02-19 NOTE — PLAN OF CARE
Problem: Fall Risk (Adult)  Goal: Absence of Fall  Outcome: Ongoing (interventions implemented as appropriate)      Problem: Patient Care Overview  Goal: Plan of Care Review  Outcome: Ongoing (interventions implemented as appropriate)      Problem: Skin Injury Risk (Adult)  Goal: Skin Health and Integrity  Outcome: Ongoing (interventions implemented as appropriate)      Problem: Cardiac: Heart Failure (Adult)  Goal: Signs and Symptoms of Listed Potential Problems Will be Absent, Minimized or Managed (Cardiac: Heart Failure)  Outcome: Ongoing (interventions implemented as appropriate)

## 2019-02-19 NOTE — PROGRESS NOTES
[facility]    Name:  Jose M Green   Age:  87 y.o.  Sex:  female  :  1931  MRN:  [id number]   Visit Number:  [id number]  Admission Date:  2019  Date Of Service:  19  Primary Care Physician:  Speedy Bravo MD     LOS: 1 day :      Chief Complaint:  Follow up chf exacerbation        Subjective / Interval History: Patient seen this morning. She was sitting up in bed with daughter Toya. She is more alert and talkative today, with improved mentation. She still clearly has hearing loss chronic, with intermittent confusion, but appeared much less anxious and less confused. Today, patient reports she is not feeling current shortness of breath. She is tolerating NC oxygen 4L.   Fingertips were too cool to read pulsox monitoring, so increased beeping stopped. Discussed with nursing to check prn spot pulsox as increased beeping  May cause more confusion.     Patient denies chest pain, anxiety, edema. She has improved cough and shortness of breath. Daughter at bedside agreed cough had improved. Patient had increased urine incontinence with +output      Review of Systems: difficult to obtain due to dementia    General ROS: Patient denies any fevers, chills or loss of consciousness. +diffuse weakness not wanting PT  Ophthalmic ROS: Denies any diplopia or transient loss of vision.  ENT ROS: Denies sore throat, nasal congestion or ear pain.   Respiratory ROS: Improved cough and shortness of breath.  Cardiovascular ROS: Denies chest pain or palpitations. No history of exertional chest pain.   Gastrointestinal ROS: Denies nausea and vomiting. Denies any abdominal pain. No diarrhea.  Genito-Urinary ROS: Denies dysuria or hematuria.  Musculoskeletal ROS: Denies back pain. No muscle pain. No calf pain.  Neurological ROS: Denies any focal weakness. No loss of consciousness. Denies any numbness. Denies neck pain.   Dermatological ROS: Denies any redness or  pruritis.    Vital Signs:    Temp:  [97.4 °F (36.3 °C)-97.9 °F (36.6 °C)] 97.7 °F (36.5 °C)  Heart Rate:  [] 90  Resp:  [18-20] 18  BP: (106-132)/(59-86) 124/72    Intake and output:    No intake/output data recorded.  I/O this shift:  In: 600 [P.O.:600]  Out: 300 [Urine:300]    Physical Examination: reexamined again today    General Appearance:    Elderly lady sitting up smiling in bed. Alert and cooperative, not in any acute distress.   Head:    Atraumatic and normocephalic   Eyes:            PERRLA, EOMI. No icterus   Throat:  Clear and moist.   Neck:   Supple, no thyromegaly   Lungs:     Trace right lower lobe crackle improved bilateral aeration. No distress. No wheezing or rhonchi.     Heart:    Regular rate and rhythm no murmur   Abdomen:     Soft, nontender, nondistended, mild ascites   Extremities:   Trace improved bilateral leg edema, no cyanosis; diffusely cool to touch stable   Skin:   No acute bleeding or rash.   Neurologic:   Diffuse muscle atrophy. No tremor. More talkative today and less anxious. Less confused. Pleasant and cooperative.      Laboratory results:    Lab Results (last 24 hours)     Procedure Component Value Units Date/Time    Troponin [[id number]]  (Normal) Collected:  02/19/19 0559    Specimen:  Blood Updated:  02/19/19 0651     Troponin I <0.012 ng/mL     Narrative:       Normal Patient Upper Reference Limit (URL) (99th Percentile)=0.03 ng/mL   Non-AMI Illness Reference Limit=0.03-0.11 ng/mL   AMI Confirmation=0.12 ng/mL and above    Basic Metabolic Panel [[id number]]  (Abnormal) Collected:  02/19/19 0559    Specimen:  Blood Updated:  02/19/19 0647     Glucose 84 mg/dL      BUN 36 mg/dL      Creatinine 1.30 mg/dL      Sodium 139 mmol/L      Potassium 5.4 mmol/L      Chloride 99 mmol/L      CO2 34.0 mmol/L      Calcium 9.0 mg/dL      eGFR Non African Amer 39 mL/min/1.73      BUN/Creatinine Ratio 27.7     Anion Gap 11.4 mmol/L     Narrative:       The MDRD GFR formula is only valid  for adults with stable renal function between ages 18 and 70.    CBC Auto Differential [[id number]]  (Abnormal) Collected:  02/19/19 0559    Specimen:  Blood Updated:  02/19/19 0622     WBC 7.89 10*3/mm3      RBC 4.40 10*6/mm3      Hemoglobin 12.3 g/dL      Hematocrit 42.3 %      MCV 96.1 fL      MCH 28.0 pg      MCHC 29.1 g/dL      RDW 18.5 %      RDW-SD 65.4 fl      MPV 11.8 fL      Platelets 148 10*3/mm3      Neutrophil % 67.5 %      Lymphocyte % 23.4 %      Monocyte % 7.9 %      Eosinophil % 0.4 %      Basophil % 0.5 %      Immature Grans % 0.3 %      Neutrophils, Absolute 5.33 10*3/mm3      Lymphocytes, Absolute 1.85 10*3/mm3      Monocytes, Absolute 0.62 10*3/mm3      Eosinophils, Absolute 0.03 10*3/mm3      Basophils, Absolute 0.04 10*3/mm3      Immature Grans, Absolute 0.02 10*3/mm3      nRBC 0.0 /100 WBC     Hemoglobin A1c [[id number]]  (Normal) Collected:  02/18/19 1945    Specimen:  Blood Updated:  02/19/19 0041     Hemoglobin A1C 5.3 %     Narrative:       Expected HgbA1C results:     3% to 6% HgbA1C      HgbA1C                 Estimated Average Glucose     5%                              97 mg/dl   6%                             126 mg/dl   7%                             154 mg/dl   8%                             183 mg/dl   9%                             212 mg/dl  >10%                           >240 mg/dl      Lipid Panel [[id number]]  (Abnormal) Collected:  02/18/19 1945    Specimen:  Blood from Arm, Left Updated:  02/18/19 2204     Total Cholesterol 127 mg/dL      Triglycerides 72 mg/dL      HDL Cholesterol 69 mg/dL      LDL Cholesterol  44 mg/dL      VLDL Cholesterol 14.4 mg/dL      LDL/HDL Ratio 0.63    Narrative:       Reference ranges for triglycerides, VLDL cholesterol, LDL cholesterol, and HDL cholesterol are not true population normal ranges but are threshold levels for increased risk of coronary artery disease established by ATP III guidelines from the National Cholesterol Education Program.     TSH [[id number]]  (Abnormal) Collected:  02/18/19 1945    Specimen:  Blood Updated:  02/18/19 2131     TSH 5.440 mIU/mL     Acinetobacter Screen - Swab, Axilla, Right [[id number]] Collected:  02/18/19 2044    Specimen:  Swab from Axilla, Right Updated:  02/18/19 2109    MRSA Screen Culture - Swab, Nares [[id number]] Collected:  02/18/19 2044    Specimen:  Swab from Nares Updated:  02/18/19 2109    VRE Culture - Swab, Per Rectum [[id number]] Collected:  02/18/19 2044    Specimen:  Swab from Per Rectum Updated:  02/18/19 2109    Troponin [[id number]]  (Normal) Collected:  02/18/19 1945    Specimen:  Blood from Arm, Left Updated:  02/18/19 2027     Troponin I <0.012 ng/mL     Narrative:       Normal Patient Upper Reference Limit (URL) (99th Percentile)=0.03 ng/mL   Non-AMI Illness Reference Limit=0.03-0.11 ng/mL   AMI Confirmation=0.12 ng/mL and above    Potassium [[id number]]  (Normal) Collected:  02/18/19 1945    Specimen:  Blood from Arm, Left Updated:  02/18/19 2026     Potassium 4.5 mmol/L     POC Glucose Once [[id number]]  (Normal) Collected:  02/18/19 1702    Specimen:  Blood Updated:  02/18/19 1705     Glucose 104 mg/dL      Comment: Serial Number: [id number]:  [id number]             I have reviewed the patient's laboratory results.    Radiology results:    Imaging Results (last 24 hours)     ** No results found for the last 24 hours. **          I have reviewed the patient's radiology reports.    Medication Review:     I have reviewed the patients active and prn medications.     Assessment:  Acute Congestive heart failure with exacerbation,Diastolic; last 2d echo EF >55% 2017  Acute hypoxic respiratory failure, with acute pulmonary edema, stable on 4L NC today  Acute  Hyperkalemia, prior to admission, improved  Acute encephalopathy, metabolic, related to hypoxia, improved  Progressive chronic dementia with multiple recent episodes of decline slowly  History of recent UTI, treated  History of recent recurrent  hematuria, blood loss, anticoagulation contraindicated and refused by family  Chronic Afib      Plan:  Continue to monitor inpatient. Patient appearing less confused and less short of breath today. Continue daily lasix, telemetry monitoring, and titration of medications. Repeat labs in AM. If further improvement, then consider transfer back to nursing facility tomorrow. Discussed with daughter at bedside Toya, who had reported POA post surgery and unavailable. Family meeting set up at [facility] for Thursday and they want to keep that meeting.     Should patient worsen, consider future hospice care.     Patient declined pt, ot.   Spot pulsox as possible on forehead, with poor perfusion and cool fingertips. Continue oxygen NC.   DNR and comfort care measures. Keep tele to monitor heart rate control monitored to see if meds need titrated any further.    Medication risks and benefits were discussed in detail. Patient reported satisfaction with care delivered and treatment plan.     Jolene Martinez DO  02/19/19  4:57 PM

## 2019-02-19 NOTE — PLAN OF CARE
Problem: Fall Risk (Adult)  Goal: Absence of Fall  Outcome: Ongoing (interventions implemented as appropriate)   02/19/19 1611   Fall Risk (Adult)   Absence of Fall making progress toward outcome

## 2019-02-20 NOTE — PROGRESS NOTES
Discharge Planning Assessment  [facility]     Patient Name: Jose M Green  MRN: [id number]  Today's Date: 2/20/2019    Admit Date: 2/18/2019    Discharge Needs Assessment    No documentation.       Discharge Plan     Row Name 02/20/19 0843       Plan    Plan [facility].  Nurse to call report to [phone] and fax DC summary to [phone].  Patient will need EMS transport.  NASIM Cain aware of patient's DC.  Nurse Katie updated.     Patient/Family in Agreement with Plan  yes        Destination      No service coordination in this encounter.      Durable Medical Equipment      No service coordination in this encounter.      Dialysis/Infusion      No service coordination in this encounter.      Home Medical Care      No service coordination in this encounter.      Community Resources      No service coordination in this encounter.        Expected Discharge Date and Time     Expected Discharge Date Expected Discharge Time    Feb 21, 2019         Demographic Summary    No documentation.       Functional Status    No documentation.       Psychosocial    No documentation.       Abuse/Neglect    No documentation.       Legal    No documentation.       Substance Abuse    No documentation.       Patient Forms    No documentation.           Becka Harper

## 2019-02-20 NOTE — PLAN OF CARE
Problem: Fall Risk (Adult)  Goal: Absence of Fall  Outcome: Ongoing (interventions implemented as appropriate)      Problem: Patient Care Overview  Goal: Plan of Care Review  Outcome: Ongoing (interventions implemented as appropriate)      Problem: Cardiac: Heart Failure (Adult)  Goal: Signs and Symptoms of Listed Potential Problems Will be Absent, Minimized or Managed (Cardiac: Heart Failure)  Outcome: Ongoing (interventions implemented as appropriate)

## 2019-02-20 NOTE — DISCHARGE SUMMARY
[facility] HOSPITALIST   DISCHARGE SUMMARY      Name:  Jose M Green   Age:  87 y.o.  Sex:  female  :  1931  MRN:  [id number]   Visit Number:  [id number]  Primary Care Physician:  Speedy Bravo MD  Date of Discharge:  2019  Admission Date:  2019    Discharge Diagnosis:     Acute Congestive heart failure with exacerbation,Diastolic; last 2d echo EF >55% 2017, improved  Acute hypoxic respiratory failure, with acute pulmonary edema, stable on 4L NC today, stable  Acute  Hyperkalemia, prior to admission, resolved  Acute encephalopathy, metabolic, related to hypoxia, resolved  Progressive chronic dementia with multiple recent episodes of decline slowly  History of recent UTI, treated  History of recent recurrent hematuria, blood loss, anticoagulation contraindicated and refused by family  Chronic Afib      History of Present Illness/Hospital Course:  The patient is an 87-year-old lady with dementia who was transferred from the [facility] to the emergency room for shortness of breath.  She had increased shortness of breath and confusion and was transferred for further evaluation and treatment.  She was just recently discharged for similar issues.  She was improved on her last hospital stay with Lasix but she had been discharged back to the nursing facility without Lasix.  She was discharged to the facility without the Lasix because she had been diuresed fully during her hospital stay and then had developed A. fib with RVR that had corrected prior to discharge.    During this admission, the patient was admitted to the hospitalist service and diuresed for 2 days and she has been stable on 2 L nasal cannula.  He does have some intermittent anxiety which seems appropriate for her shortness of breath however she is much improved.  She may continue to benefit from as needed alprazolam.  She at this point today does not require Lasix but I do feel that she is going to  require Lasix on Tuesdays and Thursdays for now with extra Lasix on a daily basis to be given by the nursing staff as needed.  I definitely agree with palliative care at this point in the family including POSKY and daughter Toya had been in agreement with this.  The patient is stable and improved to be transferred back to the nursing facility today.  I discussed the case with case management and the POA son-in-law at the bedside.    Patient is currently sitting up in bed without chest pain, shortness of breath, nausea, vomiting, abdominal pain.  He is awake and alert and communicative.  She is stable and improved for discharge back to the facility.    Of note, the patient has chronic vasoconstriction and coolness to her fingertips.  For this reason, the patient is unable to have her oxygen taken via normal pulse oximetry using her fingertips.  We used a forehead monitor during her stay but at this time with the patient's no CPR status, palliative care, he may just continue 4 L at this time and you may increase her oxygen on an as needed/comfort basis.  In the future, if the patient's medications do not improve including Lasix, the POA and family would like to consider hospice consultation.  At this time, the patient does not appear to be symptomatic enough yet but in the near future, possibly in the next 1-2 weeks, hospice may be a great option.  I did discuss this issue with her facility physician, Dr. River, who reported understanding.      [facility] with palliative care and desired future Hospice care  Lasix three times weekly: Tuesdays, Thursdays, Saturdays  As needed Lasix daily extra as needed for shortness of breath or edema  Keep oxygen 4L NC and may titrate as desired or as needed.        Consults:     Consults     No orders found from 1/20/2019 to 2/19/2019.          Procedures Performed: None             Vital Signs:    Temp:  [97.5 °F (36.4 °C)-97.9 °F (36.6 °C)] 97.5 °F (36.4 °C)  Heart Rate:   [] 91  Resp:  [16-18] 16  BP: (102-132)/(58-86) 128/79    Physical Exam:      General Appearance:   Elderly lady sitting up in bed smiling.  Alert, cooperative, in no acute distress   Head:    Normocephalic, without obvious abnormality, atraumatic   Eyes:            Lids and lashes normal, conjunctivae and sclerae normal, no   icterus, no pallor, corneas clear, PERRLA   Ears:    Ears appear intact with no abnormalities noted   Throat:   No oral lesions, no thrush, oral mucosa moist   Neck:   No adenopathy, supple, trachea midline, no thyromegaly   Back:    Small kyphosis but range of motion appears normal   Lungs:     Clear to auscultation reduced in both bases,respirations regular, even and                   unlabored    Heart:    Regular rhythm and normal rate, normal S1 and S2, no            murmur, no gallop, no rub, no click   Breast Exam:    Deferred   Abdomen:     Normal bowel sounds, no masses, no organomegaly, soft        non-tender, non-distended, no guarding, no rebound                 tenderness   Genitalia:    Deferred   Extremities:   Moves all extremities well, trace improved edema, no cyanosis   Pulses:   Pulses palpable and equal bilaterally   Skin:   No bleeding, bruising or rash   Lymph nodes:   No palpable adenopathy   Neurologic:  No tremor, sensation intact, follows commands, mildly agitated         Pertinent Lab Results:     Lab Results (all)     Procedure Component Value Units Date/Time    Basic Metabolic Panel [[id number]]  (Abnormal) Collected:  02/20/19 0753    Specimen:  Blood Updated:  02/20/19 0812     Glucose 78 mg/dL      BUN 39 mg/dL      Creatinine 1.40 mg/dL      Sodium 136 mmol/L      Potassium 4.9 mmol/L      Chloride 98 mmol/L      CO2 35.0 mmol/L      Calcium 8.3 mg/dL      eGFR Non African Amer 36 mL/min/1.73      BUN/Creatinine Ratio 27.9     Anion Gap 7.9 mmol/L     Narrative:       The MDRD GFR formula is only valid for adults with stable renal function between ages  18 and 70.    CBC & Differential [[id number]] Collected:  02/20/19 0753    Specimen:  Blood Updated:  02/20/19 0801    Narrative:       The following orders were created for panel order CBC & Differential.  Procedure                               Abnormality         Status                     ---------                               -----------         ------                     CBC Auto Differential[[id number]]        Abnormal            Final result                 Please view results for these tests on the individual orders.    CBC Auto Differential [[id number]]  (Abnormal) Collected:  02/20/19 0753    Specimen:  Blood Updated:  02/20/19 0801     WBC 4.94 10*3/mm3      RBC 3.96 10*6/mm3      Hemoglobin 11.4 g/dL      Hematocrit 37.8 %      MCV 95.5 fL      MCH 28.8 pg      MCHC 30.2 g/dL      RDW 18.1 %      RDW-SD 64.1 fl      MPV 11.0 fL      Platelets 116 10*3/mm3      Neutrophil % 66.2 %      Lymphocyte % 22.9 %      Monocyte % 7.1 %      Eosinophil % 2.4 %      Basophil % 0.8 %      Immature Grans % 0.6 %      Neutrophils, Absolute 3.27 10*3/mm3      Lymphocytes, Absolute 1.13 10*3/mm3      Monocytes, Absolute 0.35 10*3/mm3      Eosinophils, Absolute 0.12 10*3/mm3      Basophils, Absolute 0.04 10*3/mm3      Immature Grans, Absolute 0.03 10*3/mm3      nRBC 0.0 /100 WBC     Troponin [[id number]]  (Normal) Collected:  02/19/19 0559    Specimen:  Blood Updated:  02/19/19 0651     Troponin I <0.012 ng/mL     Narrative:       Normal Patient Upper Reference Limit (URL) (99th Percentile)=0.03 ng/mL   Non-AMI Illness Reference Limit=0.03-0.11 ng/mL   AMI Confirmation=0.12 ng/mL and above    Basic Metabolic Panel [[id number]]  (Abnormal) Collected:  02/19/19 0559    Specimen:  Blood Updated:  02/19/19 0647     Glucose 84 mg/dL      BUN 36 mg/dL      Creatinine 1.30 mg/dL      Sodium 139 mmol/L      Potassium 5.4 mmol/L      Chloride 99 mmol/L      CO2 34.0 mmol/L      Calcium 9.0 mg/dL      eGFR Non  Amer 39  mL/min/1.73      BUN/Creatinine Ratio 27.7     Anion Gap 11.4 mmol/L     Narrative:       The MDRD GFR formula is only valid for adults with stable renal function between ages 18 and 70.    CBC Auto Differential [[id number]]  (Abnormal) Collected:  02/19/19 0559    Specimen:  Blood Updated:  02/19/19 0622     WBC 7.89 10*3/mm3      RBC 4.40 10*6/mm3      Hemoglobin 12.3 g/dL      Hematocrit 42.3 %      MCV 96.1 fL      MCH 28.0 pg      MCHC 29.1 g/dL      RDW 18.5 %      RDW-SD 65.4 fl      MPV 11.8 fL      Platelets 148 10*3/mm3      Neutrophil % 67.5 %      Lymphocyte % 23.4 %      Monocyte % 7.9 %      Eosinophil % 0.4 %      Basophil % 0.5 %      Immature Grans % 0.3 %      Neutrophils, Absolute 5.33 10*3/mm3      Lymphocytes, Absolute 1.85 10*3/mm3      Monocytes, Absolute 0.62 10*3/mm3      Eosinophils, Absolute 0.03 10*3/mm3      Basophils, Absolute 0.04 10*3/mm3      Immature Grans, Absolute 0.02 10*3/mm3      nRBC 0.0 /100 WBC     Hemoglobin A1c [[id number]]  (Normal) Collected:  02/18/19 1945    Specimen:  Blood Updated:  02/19/19 0041     Hemoglobin A1C 5.3 %     Narrative:       Expected HgbA1C results:     3% to 6% HgbA1C      HgbA1C                 Estimated Average Glucose     5%                              97 mg/dl   6%                             126 mg/dl   7%                             154 mg/dl   8%                             183 mg/dl   9%                             212 mg/dl  >10%                           >240 mg/dl      Lipid Panel [[id number]]  (Abnormal) Collected:  02/18/19 1945    Specimen:  Blood from Arm, Left Updated:  02/18/19 2204     Total Cholesterol 127 mg/dL      Triglycerides 72 mg/dL      HDL Cholesterol 69 mg/dL      LDL Cholesterol  44 mg/dL      VLDL Cholesterol 14.4 mg/dL      LDL/HDL Ratio 0.63    Narrative:       Reference ranges for triglycerides, VLDL cholesterol, LDL cholesterol, and HDL cholesterol are not true population normal ranges but are threshold levels for  increased risk of coronary artery disease established by ATP III guidelines from the National Cholesterol Education Program.    TSH [[id number]]  (Abnormal) Collected:  02/18/19 1945    Specimen:  Blood Updated:  02/18/19 2131     TSH 5.440 mIU/mL     Acinetobacter Screen - Swab, Axilla, Right [[id number]] Collected:  02/18/19 2044    Specimen:  Swab from Axilla, Right Updated:  02/18/19 2109    MRSA Screen Culture - Swab, Nares [[id number]] Collected:  02/18/19 2044    Specimen:  Swab from Nares Updated:  02/18/19 2109    VRE Culture - Swab, Per Rectum [[id number]] Collected:  02/18/19 2044    Specimen:  Swab from Per Rectum Updated:  02/18/19 2109    Troponin [[id number]]  (Normal) Collected:  02/18/19 1945    Specimen:  Blood from Arm, Left Updated:  02/18/19 2027     Troponin I <0.012 ng/mL     Narrative:       Normal Patient Upper Reference Limit (URL) (99th Percentile)=0.03 ng/mL   Non-AMI Illness Reference Limit=0.03-0.11 ng/mL   AMI Confirmation=0.12 ng/mL and above    Potassium [[id number]]  (Normal) Collected:  02/18/19 1945    Specimen:  Blood from Arm, Left Updated:  02/18/19 2026     Potassium 4.5 mmol/L     POC Glucose Once [[id number]]  (Normal) Collected:  02/18/19 1702    Specimen:  Blood Updated:  02/18/19 1705     Glucose 104 mg/dL      Comment: Serial Number: [id number]:  [id number]       Daphne Draw [[id number]] Collected:  02/18/19 1248    Specimen:  Blood Updated:  02/18/19 1500    Narrative:       The following orders were created for panel order Daphne Draw.  Procedure                               Abnormality         Status                     ---------                               -----------         ------                     Light Blue Top[[id number]]                                   Final result               Lavender Top[[id number]]                                     Final result               Gold Top - SST[[id number]]                                   Final result               Green  Top (No Gel)[[id number]]                               Final result                 Please view results for these tests on the individual orders.    Light Blue Top [[id number]] Collected:  02/18/19 1352    Specimen:  Blood Updated:  02/18/19 1500     Extra Tube hold for add-on     Comment: Auto resulted       Lavender Top [[id number]] Collected:  02/18/19 1248    Specimen:  Blood Updated:  02/18/19 1400     Extra Tube hold for add-on     Comment: Auto resulted       Gold Top - SST [[id number]] Collected:  02/18/19 1248    Specimen:  Blood Updated:  02/18/19 1400     Extra Tube Hold for add-ons.     Comment: Auto resulted.       Green Top (No Gel) [[id number]] Collected:  02/18/19 1248    Specimen:  Blood Updated:  02/18/19 1400     Extra Tube Hold for add-ons.     Comment: Auto resulted.       Comprehensive Metabolic Panel [[id number]]  (Abnormal) Collected:  02/18/19 1248    Specimen:  Blood Updated:  02/18/19 1329     Glucose 104 mg/dL      BUN 36 mg/dL      Creatinine 1.20 mg/dL      Sodium 137 mmol/L      Potassium 6.4 mmol/L      Comment: Specimen hemolyzed.  Results may be affected.        Chloride 100 mmol/L      CO2 30.0 mmol/L      Calcium 9.2 mg/dL      Total Protein 7.5 g/dL      Albumin 4.20 g/dL      ALT (SGPT) 48 U/L      Comment: Specimen hemolyzed.  Results may be affected.        AST (SGOT) 57 U/L      Comment: Specimen hemolyzed.  Results may be affected.        Alkaline Phosphatase 234 U/L      Comment: Specimen hemolyzed. Results may be affected.        Total Bilirubin 0.7 mg/dL      eGFR Non African Amer 42 mL/min/1.73      Globulin 3.3 gm/dL      A/G Ratio 1.3 g/dL      BUN/Creatinine Ratio 30.0     Anion Gap 13.4 mmol/L     Narrative:       The MDRD GFR formula is only valid for adults with stable renal function between ages 18 and 70.    BNP [[id number]]  (Abnormal) Collected:  02/18/19 1248    Specimen:  Blood Updated:  02/18/19 1328     proBNP 12,900.0 pg/mL     Troponin [[id number]]  (Normal)  Collected:  02/18/19 1248    Specimen:  Blood Updated:  02/18/19 1328     Troponin I <0.012 ng/mL     Narrative:       Normal Patient Upper Reference Limit (URL) (99th Percentile)=0.03 ng/mL   Non-AMI Illness Reference Limit=0.03-0.11 ng/mL   AMI Confirmation=0.12 ng/mL and above    CBC & Differential [[id number]] Collected:  02/18/19 1248    Specimen:  Blood Updated:  02/18/19 1323    Narrative:       The following orders were created for panel order CBC & Differential.  Procedure                               Abnormality         Status                     ---------                               -----------         ------                     Scan Slide[[id number]]                                       Final result               CBC Auto Differential[[id number]]        Abnormal            Final result                 Please view results for these tests on the individual orders.    Scan Slide [[id number]] Collected:  02/18/19 1248    Specimen:  Blood Updated:  02/18/19 1323     Anisocytosis Slight/1+     Hypochromia Slight/1+     Ovalocytes Slight/1+     Poikilocytes Slight/1+     WBC Morphology Normal     Platelet Estimate Adequate    CBC Auto Differential [[id number]]  (Abnormal) Collected:  02/18/19 1248    Specimen:  Blood Updated:  02/18/19 1322     WBC 9.76 10*3/mm3      RBC 4.80 10*6/mm3      Hemoglobin 13.8 g/dL      Hematocrit 46.1 %      MCV 96.0 fL      MCH 28.8 pg      MCHC 29.9 g/dL      RDW 18.6 %      RDW-SD 64.8 fl      MPV 11.2 fL      Platelets 167 10*3/mm3      Neutrophil % 69.8 %      Lymphocyte % 22.4 %      Monocyte % 6.7 %      Eosinophil % 0.3 %      Basophil % 0.4 %      Immature Grans % 0.4 %      Neutrophils, Absolute 6.81 10*3/mm3      Lymphocytes, Absolute 2.19 10*3/mm3      Monocytes, Absolute 0.65 10*3/mm3      Eosinophils, Absolute 0.03 10*3/mm3      Basophils, Absolute 0.04 10*3/mm3      Immature Grans, Absolute 0.04 10*3/mm3      nRBC 0.0 /100 WBC           Pertinent Radiology  Results:    Imaging Results (all)     Procedure Component Value Units Date/Time    XR Chest 2 View [[id number]] Collected:  02/18/19 1315     Updated:  02/18/19 1320    Narrative:       PROCEDURE: XR CHEST 2 VW-        HISTORY: [facility]A Triage Protocol     COMPARISON: February 3, 2019.     FINDINGS: The heart is normal in size. The mediastinum is unremarkable.  There is interstitial pulmonary edema. There are right greater than left  pleural effusions and basilar atelectasis. There is no pneumothorax.  There are no acute osseous abnormalities. Note is made of a chronic  appearing compression deformity of the lower thoracic spine.       Impression:       Interstitial pulmonary edema with right greater than left  pleural effusions and bibasilar atelectasis.           This report was finalized on 2/18/2019 1:16 PM by Lori Daugherty M.D..          Condition on Discharge:  Stable        Code status during the hospital stay:        Discharge Disposition:    Skilled Nursing Facility (DC - External)    Discharge Medication:       Discharge Medications      New Medications      Instructions Start Date   acetaminophen 325 MG tablet  Commonly known as:  TYLENOL   650 mg, Oral, Every 4 Hours PRN      ALPRAZolam 0.25 MG tablet  Commonly known as:  XANAX   0.25 mg, Oral, 3 Times Daily PRN      furosemide 20 MG tablet  Commonly known as:  LASIX   20 mg, Oral, 3 Times Weekly      furosemide 20 MG tablet  Commonly known as:  LASIX   Daily In addition to scheduled lasix as needed for edema or shortness of breath         Changes to Medications      Instructions Start Date   atorvastatin 40 MG tablet  Commonly known as:  LIPITOR  What changed:  when to take this   40 mg, Oral, Daily      levothyroxine 112 MCG tablet  Commonly known as:  SYNTHROID, LEVOTHROID  What changed:    · medication strength  · how much to take  · additional instructions   112 mcg, Oral, Daily         Continue These Medications      Instructions Start Date    amiodarone 200 MG tablet  Commonly known as:  PACERONE   200 mg, Oral, Every 24 Hours Scheduled      aspirin 81 MG EC tablet   81 mg, Oral, Daily      diltiaZEM  MG 24 hr capsule  Commonly known as:  CARDIZEM CD   180 mg, Oral, Every 24 Hours Scheduled      docusate sodium 100 MG capsule  Commonly known as:  DOK   100 mg, Oral, 2 Times Daily PRN      metoprolol tartrate 50 MG tablet  Commonly known as:  LOPRESSOR   50 mg, Oral, 2 Times Daily      Mirabegron ER 25 MG tablet sustained-release 24 hour 24 hr tablet  Commonly known as:  MYRBETRIQ   25 mg, Oral, Daily      mirtazapine 30 MG tablet  Commonly known as:  REMERON   TAKE 1 TABLET BY MOUTH IN THE EVENING       omeprazole 20 MG capsule  Commonly known as:  priLOSEC   20 mg, Oral, Daily         Stop These Medications    sulfamethoxazole-trimethoprim 800-160 MG per tablet  Commonly known as:  BACTRIM DS,SEPTRA DS            Discharge Diet:     Diet Instructions     Diet: Cardiac      Discharge Diet:  Cardiac          Activity at Discharge:     Activity Instructions     Activity as Tolerated            Follow-up Appointments:    Future Appointments   Date Time Provider Department Center   2/25/2019  1:00 PM Obed Avalos MD [facility] None   4/22/2019  2:30 PM Speedy Bravo MD [facility] None     Additional Instructions for the Follow-ups that You Need to Schedule     Discharge Follow-up with Specialty: Facility physician; 2 Days   As directed      Specialty:  Facility physician    Follow Up:  2 Days               Test Results Pending at Discharge:     Order Current Status    Acinetobacter Screen - Swab, Axilla, Right In process    MRSA Screen Culture - Swab, Nares In process    VRE Culture - Swab, Per Rectum In process             Jolene Martinez DO  02/20/19  8:56 AM      Medication risks and benefits were discussed in great detail. The patient reported being satisfied with the current treatment plan and the care delivered while hospitalized.      Time:  I spent 35 minutes preparing discharge counseling and teaching.

## 2019-02-21 PROBLEM — N30.01 ACUTE CYSTITIS WITH HEMATURIA: Status: RESOLVED | Noted: 2017-07-25 | Resolved: 2019-01-01

## 2019-02-21 PROBLEM — R31.9 HEMATURIA: Status: RESOLVED | Noted: 2017-09-15 | Resolved: 2019-01-01

## 2019-02-21 NOTE — PROGRESS NOTES
Note entered 2/21/19 @ 1211    This [facility] nurse not here on 2/20/19 when pt discharged.  Had voice message from pt's son in law, Marcelino Francois (POA).  Contacted Mr Francois who requested clarification of conversation I had with his sister in law, Toya-pt's daughter.  I explained Toya related thinking it would be easier for family to visit pt more often if pt changed to LTC to go to [facility].  I informed her she would need to let [facility] know if that is what the family wants.  Mr Francois related he would speak with Toya.  I also informed him of Palliative Care for pt's with chronic illness and the service it provides.  He was agreeable to a referral being made.    HCP notified of referral being sent.  Heather with HCP reported [facility] had [facility]  Informed Dr Martinez spoke with pt's MD to recommend.    1850   Demographics, clinicals, POA and DC summary faxed to HCP.

## 2019-02-21 NOTE — PROGRESS NOTES
"Nursing Home Follow Up Note      Josesito River DO []   ESPERANZA Li [x]  [address] [place] [address]  Phone: [phone]  Fax: [phone] Mag Rangel MD []    Bebeto Nettles DO []   [address] [place] [address]  Phone: [phone]  Fax: [phone]     PATIENT NAME: Jose M Green                                                                          YOB: 1931           DATE OF SERVICE: 02/21/2019  FACILITY:  [][place]   [] [place]   [] [facility]   [x] [facility]   [] Other ______________________________________________________________________      CHIEF COMPLAINT:  Hospital f/u      HISTORY OF PRESENT ILLNESS:     Patient was sent to [facility], on 2/18/19, r/t an increased HR and complaints of increased SOA, since Friday 2/15. Per Physical Therapy, she had been doing very well with PT, but had suddenly gotten short of breath Friday and had to be helped back to bed. She had been unable to participate much since. Per staff and patient, she had been more SOA with any exertion. Patient is DNR, but still wished to go to the hospital as she has an extensive cardiac history and \"didn't want to have another heart attack.\" She was admitted to the hospital a few weeks ago, for Afib with RVR and CHF exacerbation.  She was admitted to the hospital on 2/18/19, for acute on chronic CHF. She was put on telemetry and diuresed with Lasix, where her symptoms improved, and she was released on 2/20/19.  She was also in Afib but patient refused external cardioversion, so her po medications were continued. She is now on 02 at 4L NC and is having no complaints of increased SOA and her respirations are not labored.  Palliative care/hospice was discussed with patient and family, in the hospital. She can not be on Hospice service while actively working with PT/OT and the LTC facility, but if condition worsens again, will discuss Hospice with patient and family.      PAST MEDICAL & " SURGICAL HISTORY:   Past Medical History:   Diagnosis Date   • Anemia    • Atherosclerotic heart disease    • Bladder disorder    • CHF (congestive heart failure) (CMS/HCC)    • Chronic lymphocytic leukemia (CMS/HCC)    • Disease of thyroid gland    • DVT (deep venous thrombosis) (CMS/HCC)    • GERD (gastroesophageal reflux disease)    • Hyperlipidemia    • Hypertension    • Inguinal hernia    • Kidney infection    • Multiple rib fractures    • Muscle weakness    • Myocardial infarction (CMS/HCC)    • Other recurrent depressive disorders (CMS/HCC)    • Other reduced mobility    • Paroxysmal atrial fibrillation (CMS/HCC)    • Urinary tract infection       Past Surgical History:   Procedure Laterality Date   • CARDIOVERSION     •  SECTION     • CYSTOSCOPY BLADDER BIOPSY N/A 2017    Procedure: CYSTOSCOPY BLADDER BIOPSY;  Surgeon: Obed Avalos MD;  Location: [facility] OR;  Service:    • CYSTOSCOPY CYSTOGRAM N/A 2017    Procedure: CYSTOSCOPY CYSTOGRAM, EVACUATION OF BLADDER CLOTS;  Surgeon: Obed Avalos MD;  Location: [facility] OR;  Service:    • HYSTERECTOMY     • OTHER SURGICAL HISTORY      Both Arm Plates   • VARICOSE VEIN SURGERY           MEDICATIONS:  I have reviewed and reconciled the patients medication list in the patients chart at the skilled nursing facility today.      ALLERGIES:  Allergies   Allergen Reactions   • Codeine Nausea And Vomiting   • Contrast Dye Hives   • Aspirin Palpitations   • Keflex [Cephalexin] Unknown (See Comments)     Pt unable to recall reaction         SOCIAL HISTORY:  Social History     Socioeconomic History   • Marital status:      Spouse name: Not on file   • Number of children: Not on file   • Years of education: Not on file   • Highest education level: Not on file   Social Needs   • Financial resource strain: Not on file   • Food insecurity - worry: Not on file   • Food insecurity - inability: Not on file   • Transportation needs - medical: Not on file    • Transportation needs - non-medical: Not on file   Occupational History   • Not on file   Tobacco Use   • Smoking status: Never Smoker   • Smokeless tobacco: Never Used   Substance and Sexual Activity   • Alcohol use: No   • Drug use: No   • Sexual activity: Defer   Other Topics Concern   • Not on file   Social History Narrative   • Not on file       FAMILY HISTORY:  Family History   Problem Relation Age of Onset   • Arthritis Mother    • Heart disease Mother    • Hypertension Mother    • Kidney disease Mother    • Heart disease Father    • Hypertension Father    • Arthritis Sister    • COPD Sister    • Heart disease Sister        REVIEW OF SYSTEMS:    Review of Systems   Constitutional: Negative for activity change, appetite change, chills, diaphoresis, fatigue, fever, unexpected weight gain and unexpected weight loss.   HENT: Negative for congestion, ear pain, mouth sores, nosebleeds, postnasal drip, rhinorrhea, sinus pressure, sneezing, sore throat and trouble swallowing.    Eyes: Negative for blurred vision, pain, discharge, redness, itching and visual disturbance.   Respiratory: Positive for shortness of breath. Negative for apnea, cough, choking, chest tightness, wheezing and stridor.    Cardiovascular: Positive for palpitations. Negative for chest pain and leg swelling.   Gastrointestinal: Negative for abdominal distention, abdominal pain, blood in stool, constipation, diarrhea, nausea, vomiting, GERD and indigestion.   Endocrine: Negative for polydipsia and polyuria.   Genitourinary: Negative for urinary incontinence, decreased urine volume, difficulty urinating, dysuria, flank pain, frequency, hematuria and urgency.   Musculoskeletal: Positive for arthralgias. Negative for back pain, gait problem, joint swelling and myalgias.   Skin: Negative for color change, dry skin, rash and skin lesions.   Allergic/Immunologic: Negative for environmental allergies.   Neurological: Positive for weakness and memory  problem. Negative for dizziness, seizures, speech difficulty and confusion.   Psychiatric/Behavioral: Negative for behavioral problems, dysphoric mood, hallucinations, sleep disturbance and depressed mood. The patient is not nervous/anxious.          PHYSICAL EXAMINATION:   VITAL SIGNS: BP: 108/62  HR: 80 FI02: 94 on 4 L NC RR: 20  Temp: 97.1 Wt: 113    Physical Exam   Constitutional: She appears well-developed and well-nourished.   HENT:   Head: Normocephalic.   Right Ear: External ear normal.   Left Ear: External ear normal.   Nose: Nose normal.   Eyes: Conjunctivae are normal.   Neck: Normal range of motion.   Cardiovascular: Normal heart sounds and intact distal pulses. An irregular rhythm present. Tachycardia present.   A fib   Pulmonary/Chest: Accessory muscle usage present. Tachypnea noted. She is in respiratory distress. She has decreased breath sounds. She has no wheezes. She has no rales.   Abdominal: Soft. Bowel sounds are normal. She exhibits no distension and no mass. There is no tenderness. No hernia.   Musculoskeletal: She exhibits no edema.   NROM all major joints   Neurological: She is alert.   Skin: Skin is warm and dry. No rash noted.   Psychiatric: She has a normal mood and affect. Her behavior is normal.   Nursing note and vitals reviewed.      RECORDS REVIEW:   I have reviewed and interpreted the following lab test results  obtained at the time of the visit today.  Last CBC and BMP were done on 2/20/19, and others were done on admission on 2/18/19 and are in Epic.      ASSESSMENT     Diagnoses and all orders for this visit:    Hospital discharge follow-up    Acute diastolic congestive heart failure (CMS/HCC)    Paroxysmal atrial fibrillation (CMS/HCC)    Chronic lymphocytic leukemia (CMS/HCC)        PLAN    Patient was sent to [facility], on 2/18/19, r/t an increased HR and complaints of increased SOA, since Friday 2/15. Per Physical Therapy, she had been doing very well with PT, but had suddenly  "gotten short of breath Friday and had to be helped back to bed. She had been unable to participate much since. Per staff and patient, she had been more SOA with any exertion. Patient is DNR, but still wished to go to the hospital as she has an extensive cardiac history and \"didn't want to have another heart attack.\" She was admitted to the hospital a few weeks ago, for Afib with RVR and CHF exacerbation.  She was admitted to the hospital on 2/18/19, for acute on chronic CHF. She was put on telemetry and diuresed with Lasix, where her symptoms improved, and she was released on 2/20/19.  She was also in Afib but patient refused external cardioversion, so her po medications were continued. She is now on 02 at 4L NC and is having no complaints of increased SOA and her respirations are not labored.  Palliative care/hospice was discussed with patient and family, in the hospital. She can not be on Hospice service while actively working with PT/OT and the LTC facility, but if condition worsens again, will discuss Hospice with patient and family. She also has CLL and is no longer receiving any treatment.      [x]  Discussed Patient in detail with nursing/staff, addressed all needs today.     [x]  Plan of Care Reviewed   []  PT/OT Reviewed   []  Order Changes  [x]  Discharge Plans Reviewed  [x]  Advance Directive on file with Nursing Home.   [x]  POA on file with Nursing Home.   [x]  Code Status listed: []  Full Code   [x]  DNR           Urmila Wallis, APRN.     "

## 2019-03-04 NOTE — PROGRESS NOTES
Nursing Home Follow Up Note      Josesito River DO []   ESPERANZA Li [x]  [address], [place] [address]  Phone: [phone]  Fax: [phone] Mag Rangel MD []    Bebeto Nettles DO []   [address] [place] [address]  Phone: [phone]  Fax: [phone]     PATIENT NAME: Jose M Green                                                                          YOB: 1931           DATE OF SERVICE: 03/04//2019  FACILITY:  [][place]   [] [place]   [] [facility]   [x] [facility]   [] Other ______________________________________________________________________      CHIEF COMPLAINT:  Continued shortness of breath and lower extremity swelling.      HISTORY OF PRESENT ILLNESS:     Patient was sent to [facility], on 2/18/19, r/t an increased HR and complaints of increased SOA. She was put on telemetry and diuresed with Lasix, where her symptoms improved, and she was released on 2/20/19.  She was also in Afib but patient refused external cardioversion, so her po medications were continued. Palliative care/hospice was discussed with patient and family, in the hospital. She can not be on Hospice service while actively working with PT/OT and the LTC facility however family has decided to make her palliative care. She has complained of shortness of breath since yesterday, and her Lasix dose was increased for 3 days.      PAST MEDICAL & SURGICAL HISTORY:   Past Medical History:   Diagnosis Date   • Anemia    • Atherosclerotic heart disease    • Bladder disorder    • CHF (congestive heart failure) (CMS/HCC)    • Chronic lymphocytic leukemia (CMS/HCC)    • Disease of thyroid gland    • DVT (deep venous thrombosis) (CMS/HCC)    • GERD (gastroesophageal reflux disease)    • Hyperlipidemia    • Hypertension    • Inguinal hernia    • Kidney infection    • Multiple rib fractures    • Muscle weakness    • Myocardial infarction (CMS/HCC)    • Other recurrent depressive disorders (CMS/HCC)     • Other reduced mobility    • Paroxysmal atrial fibrillation (CMS/HCC)    • Urinary tract infection       Past Surgical History:   Procedure Laterality Date   • CARDIOVERSION     •  SECTION     • CYSTOSCOPY BLADDER BIOPSY N/A 2017    Procedure: CYSTOSCOPY BLADDER BIOPSY;  Surgeon: Obed Avalos MD;  Location: [facility];  Service:    • CYSTOSCOPY CYSTOGRAM N/A 2017    Procedure: CYSTOSCOPY CYSTOGRAM, EVACUATION OF BLADDER CLOTS;  Surgeon: Obed Avalos MD;  Location: [facility];  Service:    • HYSTERECTOMY     • OTHER SURGICAL HISTORY      Both Arm Plates   • VARICOSE VEIN SURGERY           MEDICATIONS:  I have reviewed and reconciled the patients medication list in the patients chart at the [facility] today.      ALLERGIES:  Allergies   Allergen Reactions   • Codeine Nausea And Vomiting   • Contrast Dye Hives   • Aspirin Palpitations   • Keflex [Cephalexin] Unknown (See Comments)     Pt unable to recall reaction         SOCIAL HISTORY:  Social History     Socioeconomic History   • Marital status:      Spouse name: Not on file   • Number of children: Not on file   • Years of education: Not on file   • Highest education level: Not on file   Social Needs   • Financial resource strain: Not on file   • Food insecurity - worry: Not on file   • Food insecurity - inability: Not on file   • Transportation needs - medical: Not on file   • Transportation needs - non-medical: Not on file   Occupational History   • Not on file   Tobacco Use   • Smoking status: Never Smoker   • Smokeless tobacco: Never Used   Substance and Sexual Activity   • Alcohol use: No   • Drug use: No   • Sexual activity: Defer   Other Topics Concern   • Not on file   Social History Narrative   • Not on file       FAMILY HISTORY:  Family History   Problem Relation Age of Onset   • Arthritis Mother    • Heart disease Mother    • Hypertension Mother    • Kidney disease Mother    • Heart disease Father    •  Hypertension Father    • Arthritis Sister    • COPD Sister    • Heart disease Sister        REVIEW OF SYSTEMS:    Review of Systems   Constitutional: Negative for activity change, appetite change, chills, diaphoresis, fatigue, fever, unexpected weight gain and unexpected weight loss.   HENT: Negative for congestion, ear pain, mouth sores, nosebleeds, postnasal drip, rhinorrhea, sinus pressure, sneezing, sore throat and trouble swallowing.    Eyes: Negative for blurred vision, pain, discharge, redness, itching and visual disturbance.   Respiratory: Positive for shortness of breath. Negative for apnea, cough, choking, chest tightness, wheezing and stridor.    Cardiovascular: Positive for palpitations and leg swelling. Negative for chest pain.   Gastrointestinal: Negative for abdominal distention, abdominal pain, blood in stool, constipation, diarrhea, nausea, vomiting, GERD and indigestion.   Endocrine: Negative for polydipsia and polyuria.   Genitourinary: Negative for urinary incontinence, decreased urine volume, difficulty urinating, dysuria, flank pain, frequency, hematuria and urgency.   Musculoskeletal: Positive for arthralgias. Negative for back pain, gait problem, joint swelling and myalgias.   Skin: Negative for color change, dry skin, rash and skin lesions.   Allergic/Immunologic: Negative for environmental allergies.   Neurological: Positive for weakness and memory problem. Negative for dizziness, seizures, speech difficulty and confusion.   Psychiatric/Behavioral: Negative for behavioral problems, dysphoric mood, hallucinations, sleep disturbance and depressed mood. The patient is not nervous/anxious.          PHYSICAL EXAMINATION:   VITAL SIGNS: BP: 134/78  HR: 99 FI02: 94 on 4 L NC RR: 22  Temp: 97.8 Wt: 132    Physical Exam   Constitutional: She appears well-developed and well-nourished.   HENT:   Head: Normocephalic.   Right Ear: External ear normal.   Left Ear: External ear normal.   Nose: Nose  normal.   Eyes: Conjunctivae are normal.   Neck: Normal range of motion.   Cardiovascular: Normal heart sounds and intact distal pulses. An irregular rhythm present. Tachycardia present.   A fib   Pulmonary/Chest: Effort normal and breath sounds normal. Tachypnea noted. No respiratory distress. She has no wheezes. She has no rales.   Shallow breathing   Abdominal: Soft. Bowel sounds are normal. She exhibits no distension and no mass. There is no tenderness. No hernia.   Musculoskeletal: She exhibits edema.   NROM all major joints   Neurological: She is alert.   Skin: Skin is warm and dry. No rash noted.   Psychiatric: She has a normal mood and affect. Her behavior is normal.   Nursing note and vitals reviewed.      RECORDS REVIEW:   I have reviewed and interpreted the following lab test results  obtained at the time of the visit today.  Last CBC and BMP were done on 2/20/19, and others were done on admission on 2/18/19 and are in Epic.      ASSESSMENT     Diagnoses and all orders for this visit:    Paroxysmal atrial fibrillation (CMS/HCC)    Acute diastolic congestive heart failure (CMS/HCC)    Localized edema    Shortness of breath        PLAN  Patient was started on an increased dose of Lasix on 3/3/19 to decrease the edema and shortness of air, due to her chronic CHF.. She is not in respiratory distress and is resting comfortably in her chair upon exam. Her lower extremities have mild edema, which is expected at this point in her disease process. Patient is now a palliative care patient, we will continue to monitor for discomfort related to her chronic A-fib. If patient continues to decline we may need to re-evaluate patient for Hospice care if the family wishes.     Staff was encouraged to keep me informed of any acute changes, lack of improvement, or any new concerning symptoms.    [x]  Discussed Patient in detail with nursing/staff, addressed all needs today.     [x]  Plan of Care Reviewed   []  PT/OT Reviewed    [x]  Order Changes  [x]  Discharge Plans Reviewed  [x]  Advance Directive on file with Nursing Home.   [x]  POA on file with Nursing Home.   [x]  Code Status listed: []  Full Code   [x]  DNR           Urmila Wallis, ESPERANZA.

## 2019-03-05 NOTE — ED PROVIDER NOTES
Subjective   87-year-old female presents to the ED via EMS for chief complaint of hypoxia.  Per EMS the patient's oxygen level was in the 60s on their arrival.  The patient on BiPAP which did improve her symptoms.  The patient is lethargic and sleepy per EMS.  She is coming from [facility].  The patient has had multiple recent admissions for A. fib with RVR and acute congestive heart failure.  Further history and review of systems is limited secondary to patient's clinical status and dementia.            Review of Systems   Reason unable to perform ROS: Dementia, acute respiratory distress.       Past Medical History:   Diagnosis Date   • Anemia    • Atherosclerotic heart disease    • Bladder disorder    • CHF (congestive heart failure) (CMS/HCC)    • Chronic lymphocytic leukemia (CMS/HCC)    • Disease of thyroid gland    • DVT (deep venous thrombosis) (CMS/HCC)    • GERD (gastroesophageal reflux disease)    • Hyperlipidemia    • Hypertension    • Inguinal hernia    • Kidney infection    • Multiple rib fractures    • Muscle weakness    • Myocardial infarction (CMS/HCC)    • Other recurrent depressive disorders (CMS/HCC)    • Other reduced mobility    • Paroxysmal atrial fibrillation (CMS/HCC)    • Urinary tract infection        Allergies   Allergen Reactions   • Codeine Nausea And Vomiting   • Contrast Dye Hives   • Aspirin Palpitations   • Keflex [Cephalexin] Unknown (See Comments)     Pt unable to recall reaction       Past Surgical History:   Procedure Laterality Date   • CARDIOVERSION     •  SECTION     • CYSTOSCOPY BLADDER BIOPSY N/A 2017    Procedure: CYSTOSCOPY BLADDER BIOPSY;  Surgeon: Obed Avalos MD;  Location: [facility] OR;  Service:    • CYSTOSCOPY CYSTOGRAM N/A 2017    Procedure: CYSTOSCOPY CYSTOGRAM, EVACUATION OF BLADDER CLOTS;  Surgeon: Obed Avalos MD;  Location: [facility] OR;  Service:    • HYSTERECTOMY     • OTHER SURGICAL HISTORY      Both Arm Plates   • VARICOSE VEIN SURGERY          Family History   Problem Relation Age of Onset   • Arthritis Mother    • Heart disease Mother    • Hypertension Mother    • Kidney disease Mother    • Heart disease Father    • Hypertension Father    • Arthritis Sister    • COPD Sister    • Heart disease Sister        Social History     Socioeconomic History   • Marital status:      Spouse name: Not on file   • Number of children: Not on file   • Years of education: Not on file   • Highest education level: Not on file   Tobacco Use   • Smoking status: Never Smoker   • Smokeless tobacco: Never Used   Substance and Sexual Activity   • Alcohol use: No   • Drug use: No   • Sexual activity: Defer           Objective   Physical Exam   Constitutional: She appears distressed.   Chronically ill-appearing.  Minimally responsive.  Distressed.   HENT:   Head: Normocephalic and atraumatic.   Nose: Nose normal.   Eyes: Conjunctivae and EOM are normal.   Cardiovascular:   Irregular rhythm.  Tachycardic.   Pulmonary/Chest: She has rales.   On BiPAP.  Coarse breath sounds diffusely.   Abdominal: Soft. She exhibits no distension. There is no tenderness. There is no guarding.   Musculoskeletal: She exhibits edema. She exhibits no deformity.   Edema bilateral lower extremities.   Neurological:   Disoriented.  Sleepy.  Arousable to pain.   Skin: She is not diaphoretic.   Nursing note and vitals reviewed.      Critical Care  Performed by: Virgilio Norris DO  Authorized by: Virgilio Norris DO     Critical care provider statement:     Critical care time (minutes):  30    Critical care was necessary to treat or prevent imminent or life-threatening deterioration of the following conditions:  Cardiac failure, respiratory failure, sepsis, shock and circulatory failure    Critical care was time spent personally by me on the following activities:  Ordering and performing treatments and interventions, development of treatment plan with patient or surrogate, discussions with  consultants, evaluation of patient's response to treatment, examination of patient, ordering and review of laboratory studies, ordering and review of radiographic studies, pulse oximetry, re-evaluation of patient's condition and review of old charts           She presented to the ED in acute respiratory failure.  Laboratory results show an elevated BN P.  Chest x-ray shows complete whiteout of the right side of the lung likely effusion.  Patient was given Lasix.  She was continued on BiPAP.  ABG shows significant hypercapnia.  Discussed the case with the family who is agreeable for hospice as she was likely to be discharged on hospice on her last admission.  See note below.    ED Course  ED Course as of Mar 05 1733   Tue Mar 05, 2019   1455 And for possible admission to hospice compassionate care center.  Patient is likely at end of life given hypercapnia and physical examination.  We will attempt to consult hospice.  [CG]   1549 EKG interpreted by me.  Atrial fibrillation.  Rate of 85.  Right axis deviation.  Nonspecific ST segment changes.  Abnormal EKG  [CG]   1605 Patient was seen and evaluated by the hospice nurse.  She was accepted at the compassionate care center.  Family is agreeable with this plan and the patient will be transferred to [facility] for comfort measures.  [CG]      ED Course User Index  [CG] Virgilio Norris, DO                  MDM      Final diagnoses:   Acute respiratory failure with hypercapnia (CMS/HCC)   Pleural effusion   Toxic metabolic encephalopathy            Virgilio Norris, DO  03/05/19 1733       Virgilio Norris, DO  03/05/19 1733

## 2019-03-05 NOTE — ED NOTES
Called Dr. Rojas per Dr. Norris. Call was transferred @ this time.      Rachel Angeles  03/05/19 4497

## 2019-03-05 NOTE — ED NOTES
Pts daughter @ [facility]. Gisela w/ Hospice Care @ [facility] discussing POC w/ pts daughter.      Renita Hanson, RN  03/05/19 5043

## 2019-03-05 NOTE — ED NOTES
Pt sitting up in bed speaking w/ family. Pt on 4L via NC, SPO2 @ 94%     Renita Hanson, RN  03/05/19 6152

## 2019-03-05 NOTE — ED NOTES
Pt repositioned, pt cont. on bipap, VSS, warm blankets and pillows provided. Lab called to obtain blood for labs.     Renita Hanson RN  03/05/19 3337

## 2019-03-06 ENCOUNTER — EPISODE CHANGES (OUTPATIENT)
Dept: CASE MANAGEMENT | Facility: OTHER | Age: 84
End: 2019-03-06

## 2020-01-10 NOTE — PROGRESS NOTES
Discharge Planning Assessment  [facility]     Patient Name: Jose M Green  MRN: [id number]  Today's Date: 9/13/2017    Admit Date: 9/11/2017          Discharge Needs Assessment     None            Discharge Plan       09/13/17 0903    Case Management/Social Work Plan    Plan Spoke with patient, she is alone.  States her son in law Ant should be here later today, he is her POA.  She lives in an apartment connected to her daughter and son-in law's home.  She has a caregiver 4x per week during the day who does her cooking and cleaning and assists with ADL's.  She also has home health who she belives is [facility] and they set her up with 3 months of lifeline .  Patient's apartment is handicap accessible and she uses a walker.  Her son in law also brings her dinner in the evenings if her caretaker has not made something.  [facility] and she states she would be in agreement for a few weeks but is not sure which facility.  Asked that CM speak with her son in law regartding this.  Call to Ant son in law and no answer and unable to leave message.  Informed nurse Fabiola to jaimee SCHREIBER when he arrives to hospital.    Patient/Family In Agreement With Plan yes        Discharge Placement     No information found                Demographic Summary     None            Functional Status     None            Psychosocial     None            Abuse/Neglect     None            Legal     None            Substance Abuse     None            Patient Forms     None          Becka Harper     head injury

## (undated) DEVICE — URINE LEG BAG: Brand: DOVER

## (undated) DEVICE — PISTON SYRINGE,IRRIGATION WITH PROTECTIVE CAP: Brand: DOVER

## (undated) DEVICE — GW .035 145CM

## (undated) DEVICE — GAMMEX® NON-LATEX SIZE 7, STERILE NEOPRENE POWDER-FREE SURGICAL GLOVE: Brand: GAMMEX

## (undated) DEVICE — SOL IRR NACL 0.9PCT 3000ML

## (undated) DEVICE — CUTTING ELECTRODE BIPO 22FR 12/30°  FOR RESECTOSCOPES, TELESCOPE Ø 4MM, FOR SHEATHS, CONTINUOUS IRRIGATION, 22/24, FR, LOOP: ROUND, WIRE Ø 0.3MM, FORK COLOR BLUE, STEM COLOR BLUE, PACK=3 PCS, FOR SHARK AND S-LINE RESECTOSCOPES, STERILE, FOR SINGLE USE: Brand: SHARK/S-LINE

## (undated) DEVICE — GOWN,PREVENTION PLUS,XLARGE,STERILE: Brand: MEDLINE

## (undated) DEVICE — Device

## (undated) DEVICE — URINE DRAINAGE BAG,LUER-SLIP SAMPLING, ANTI-REFLUX DEVICE, DRAIN PORT: Brand: DOVER

## (undated) DEVICE — SYR LUERLOK 50ML

## (undated) DEVICE — CUFF SCD HEMOFORCE SEQ CALF STD MD

## (undated) DEVICE — DRSNG TELFA PAD NONADH STR 1S 3X8IN

## (undated) DEVICE — TBG FLUID WARM ST SNGL

## (undated) DEVICE — SYR LUERLOK 30CC

## (undated) DEVICE — CATH URETRL FLXITP POLLACK STD 5F 70CM

## (undated) DEVICE — PAD GRND REM POLYHESIVE A/ DISP

## (undated) DEVICE — DOVER HYDROGEL COATED LATEX FOLEY CATHETER, 30 ML, 3-WAY 22 FR/CH (7.3 MM): Brand: DOVER

## (undated) DEVICE — RICH CYSTO: Brand: MEDLINE INDUSTRIES, INC.

## (undated) DEVICE — DRAINBAG,ANTI-REFLUX TOWER,L/F,2000ML,LL: Brand: MEDLINE